# Patient Record
Sex: MALE | Race: WHITE | NOT HISPANIC OR LATINO | Employment: OTHER | URBAN - METROPOLITAN AREA
[De-identification: names, ages, dates, MRNs, and addresses within clinical notes are randomized per-mention and may not be internally consistent; named-entity substitution may affect disease eponyms.]

---

## 2017-03-07 ENCOUNTER — TELEPHONE (OUTPATIENT)
Dept: GASTROENTEROLOGY | Facility: CLINIC | Age: 62
End: 2017-03-07

## 2017-03-31 ENCOUNTER — TELEPHONE (OUTPATIENT)
Dept: GASTROENTEROLOGY | Facility: CLINIC | Age: 62
End: 2017-03-31

## 2017-03-31 NOTE — TELEPHONE ENCOUNTER
----- Message from Dominga Capone LPN sent at 3/7/2017  8:16 AM CST -----  Regarding: Schedule colon consult apt  Schedule colon consult apt

## 2018-02-14 ENCOUNTER — HOSPITAL ENCOUNTER (EMERGENCY)
Facility: HOSPITAL | Age: 63
Discharge: HOME | End: 2018-02-14
Attending: EMERGENCY MEDICINE
Payer: MEDICARE

## 2018-02-14 VITALS
SYSTOLIC BLOOD PRESSURE: 155 MMHG | RESPIRATION RATE: 27 BRPM | OXYGEN SATURATION: 99 % | HEIGHT: 67 IN | WEIGHT: 187 LBS | BODY MASS INDEX: 29.35 KG/M2 | DIASTOLIC BLOOD PRESSURE: 85 MMHG | TEMPERATURE: 98 F | HEART RATE: 92 BPM

## 2018-02-14 DIAGNOSIS — Z76.0 MEDICATION REFILL: ICD-10-CM

## 2018-02-14 DIAGNOSIS — I48.91 ATRIAL FIBRILLATION WITH RVR: Primary | ICD-10-CM

## 2018-02-14 DIAGNOSIS — R07.9 CHEST PAIN: ICD-10-CM

## 2018-02-14 DIAGNOSIS — I50.9 CHF (CONGESTIVE HEART FAILURE): ICD-10-CM

## 2018-02-14 LAB
ALBUMIN SERPL BCP-MCNC: 3.7 G/DL
ALP SERPL-CCNC: 121 U/L
ALT SERPL W/O P-5'-P-CCNC: 21 U/L
ANION GAP SERPL CALC-SCNC: 15 MMOL/L
AST SERPL-CCNC: 32 U/L
BASOPHILS # BLD AUTO: 0.04 K/UL
BASOPHILS NFR BLD: 0.6 %
BILIRUB SERPL-MCNC: 1.9 MG/DL
BNP SERPL-MCNC: 757 PG/ML
BUN SERPL-MCNC: 14 MG/DL
CALCIUM SERPL-MCNC: 10 MG/DL
CHLORIDE SERPL-SCNC: 103 MMOL/L
CO2 SERPL-SCNC: 21 MMOL/L
CREAT SERPL-MCNC: 1 MG/DL
DIASTOLIC DYSFUNCTION: YES
DIFFERENTIAL METHOD: ABNORMAL
EOSINOPHIL # BLD AUTO: 0.1 K/UL
EOSINOPHIL NFR BLD: 2.1 %
ERYTHROCYTE [DISTWIDTH] IN BLOOD BY AUTOMATED COUNT: 15.6 %
EST. GFR  (AFRICAN AMERICAN): >60 ML/MIN/1.73 M^2
EST. GFR  (NON AFRICAN AMERICAN): >60 ML/MIN/1.73 M^2
ESTIMATED PA SYSTOLIC PRESSURE: 62.09
GLUCOSE SERPL-MCNC: 158 MG/DL
HCT VFR BLD AUTO: 50.8 %
HGB BLD-MCNC: 16.6 G/DL
LYMPHOCYTES # BLD AUTO: 2.6 K/UL
LYMPHOCYTES NFR BLD: 39.3 %
MCH RBC QN AUTO: 31.4 PG
MCHC RBC AUTO-ENTMCNC: 32.7 G/DL
MCV RBC AUTO: 96 FL
MITRAL VALVE MOBILITY: NORMAL
MITRAL VALVE REGURGITATION: ABNORMAL
MONOCYTES # BLD AUTO: 0.6 K/UL
MONOCYTES NFR BLD: 9 %
NEUTROPHILS # BLD AUTO: 3.2 K/UL
NEUTROPHILS NFR BLD: 49 %
PLATELET # BLD AUTO: 153 K/UL
PMV BLD AUTO: 9.8 FL
POTASSIUM SERPL-SCNC: 3.7 MMOL/L
PROT SERPL-MCNC: 8.3 G/DL
RBC # BLD AUTO: 5.29 M/UL
RETIRED EF AND QEF - SEE NOTES: 20 (ref 55–65)
SODIUM SERPL-SCNC: 139 MMOL/L
TRICUSPID VALVE REGURGITATION: ABNORMAL
TROPONIN I SERPL DL<=0.01 NG/ML-MCNC: 0.02 NG/ML
WBC # BLD AUTO: 6.57 K/UL

## 2018-02-14 PROCEDURE — 99285 EMERGENCY DEPT VISIT HI MDM: CPT | Mod: ,,, | Performed by: INTERNAL MEDICINE

## 2018-02-14 PROCEDURE — 83880 ASSAY OF NATRIURETIC PEPTIDE: CPT

## 2018-02-14 PROCEDURE — 84484 ASSAY OF TROPONIN QUANT: CPT

## 2018-02-14 PROCEDURE — 93306 TTE W/DOPPLER COMPLETE: CPT

## 2018-02-14 PROCEDURE — 25000003 PHARM REV CODE 250: Performed by: EMERGENCY MEDICINE

## 2018-02-14 PROCEDURE — 96374 THER/PROPH/DIAG INJ IV PUSH: CPT

## 2018-02-14 PROCEDURE — 93010 ELECTROCARDIOGRAM REPORT: CPT | Mod: ,,, | Performed by: INTERNAL MEDICINE

## 2018-02-14 PROCEDURE — 99284 EMERGENCY DEPT VISIT MOD MDM: CPT | Mod: 25

## 2018-02-14 PROCEDURE — 93306 TTE W/DOPPLER COMPLETE: CPT | Mod: 26,,, | Performed by: INTERNAL MEDICINE

## 2018-02-14 PROCEDURE — 85025 COMPLETE CBC W/AUTO DIFF WBC: CPT

## 2018-02-14 PROCEDURE — 93005 ELECTROCARDIOGRAM TRACING: CPT

## 2018-02-14 PROCEDURE — 80053 COMPREHEN METABOLIC PANEL: CPT

## 2018-02-14 RX ORDER — FUROSEMIDE 40 MG/1
40 TABLET ORAL 2 TIMES DAILY
COMMUNITY
End: 2018-02-14

## 2018-02-14 RX ORDER — FUROSEMIDE 40 MG/1
40 TABLET ORAL 2 TIMES DAILY
Qty: 30 TABLET | Refills: 0 | Status: SHIPPED | OUTPATIENT
Start: 2018-02-14 | End: 2018-02-20 | Stop reason: SDUPTHER

## 2018-02-14 RX ORDER — CARVEDILOL 6.25 MG/1
6.25 TABLET ORAL 2 TIMES DAILY WITH MEALS
Qty: 60 TABLET | Refills: 0 | Status: SHIPPED | OUTPATIENT
Start: 2018-02-14 | End: 2018-02-20 | Stop reason: SDUPTHER

## 2018-02-14 RX ORDER — DIGOXIN 250 MCG
250 TABLET ORAL DAILY
Qty: 30 TABLET | Refills: 0 | Status: SHIPPED | OUTPATIENT
Start: 2018-02-14 | End: 2018-02-20 | Stop reason: SDUPTHER

## 2018-02-14 RX ORDER — LISINOPRIL 10 MG/1
10 TABLET ORAL DAILY
COMMUNITY
End: 2018-02-14

## 2018-02-14 RX ORDER — CARVEDILOL 6.25 MG/1
6.25 TABLET ORAL 2 TIMES DAILY WITH MEALS
COMMUNITY
End: 2018-02-14

## 2018-02-14 RX ORDER — PANTOPRAZOLE SODIUM 40 MG/1
40 TABLET, DELAYED RELEASE ORAL DAILY
Qty: 90 TABLET | Refills: 0 | Status: ON HOLD | OUTPATIENT
Start: 2018-02-14 | End: 2018-08-17 | Stop reason: SDUPTHER

## 2018-02-14 RX ORDER — DIGOXIN 250 MCG
250 TABLET ORAL DAILY
COMMUNITY
End: 2018-02-14

## 2018-02-14 RX ORDER — ASPIRIN 81 MG/1
81 TABLET ORAL DAILY
COMMUNITY
End: 2018-02-20 | Stop reason: SDUPTHER

## 2018-02-14 RX ORDER — DILTIAZEM HYDROCHLORIDE 5 MG/ML
20 INJECTION INTRAVENOUS
Status: COMPLETED | OUTPATIENT
Start: 2018-02-14 | End: 2018-02-14

## 2018-02-14 RX ORDER — LISINOPRIL 10 MG/1
10 TABLET ORAL DAILY
Qty: 30 TABLET | Refills: 0 | Status: SHIPPED | OUTPATIENT
Start: 2018-02-14 | End: 2018-02-20 | Stop reason: SDUPTHER

## 2018-02-14 RX ADMIN — DILTIAZEM HYDROCHLORIDE 20 MG: 5 INJECTION INTRAVENOUS at 06:02

## 2018-02-14 RX ADMIN — APIXABAN 5 MG: 2.5 TABLET, FILM COATED ORAL at 10:02

## 2018-02-14 NOTE — HPI
This is a 62 year old male pt with PMHx NICM EF 25%, Chronic AF not previously on a/c due to GID, HTN, alc abuse in past presented to ED with chief complaint of getting weak, sweating, palpitations. Pt said he was doing ok yesterday but after drinking some beer he felt more tired and started having more palpitations. He had been seen a few years ago by cardiology and had been doing well. He is compliant with meds but is going to run out soon and came to ED for evaluation. He was given IV cardizem as he was in AF RVR and felt fine after HR improved.     ECG - AF RVR, trop negative, . Upon my exam pt felt fine, HR and BP well controlled. Wants to follow up in clinic.

## 2018-02-14 NOTE — HOSPITAL COURSE
As above, pt was given Diltiazem x 1 and HR improved and pt felt well. Echo performed revealed EF 20-25% as previously known. Stable for DC from ED and will follow up in clinic.

## 2018-02-14 NOTE — ED NOTES
Report received from MADISON Swenson. Care of patient transferred at this time.    Pt AAOx3, resting in bed, side rails up x 2, call bell within reach. NAD at this time. Will continue to monitor.

## 2018-02-14 NOTE — ASSESSMENT & PLAN NOTE
Cont current meds  Discussed low salt diet and fluid restriction  Patient is euvolemic  Will f/u in clinic

## 2018-02-14 NOTE — ED PROVIDER NOTES
SCRIBE #1 NOTE: I, Sherman Webster, am scribing for, and in the presence of, cT Arreola Do, MD. I have scribed the entire note.      History      Chief Complaint   Patient presents with    Chest Pain       Review of patient's allergies indicates:  No Known Allergies     HPI   HPI    2/14/2018, 6:35 AM   History obtained from the patient      History of Present Illness: Ute Mcbride is a 62 y.o. male patient with Hx of AFIB and MI presents to the Emergency Department for CP which onset suddenly this AM. Symptoms are epeisodic and modeerate in severity. Sx are exacerbated by nothing and relieved by nohting. Associated sxs include SOB. Pt states his CP and SOB has currently resolved. No other sxs reported. Pt states he is compliant with his medications but was not able to take his medications this AM. Patient denies any fever, N/V/D, chills, abd pain, weakness/numbness, radiating pain, chest tightness, leg swelling, palpitations, HA, lightheadedness, dizziness, cough and all other sxs at this time. Pt reports MI approximately 6 years ago and denies f/u with cardiology. No further complaints or concerns at this time.     Arrival mode: Personal vehicle     PCP: Kenneth Redding MD       Past Medical History:  Past Medical History:   Diagnosis Date    Anticoagulant long-term use     but has been noncompliant    Arthritis     Atrial fibrillation, chronic     Cardiomyopathy, nonischemic 11/9/2014    CHF (congestive heart failure)     Gout     Hypertension        Past Surgical History:  Past Surgical History:   Procedure Laterality Date    ANKLE SURGERY      CATARACT EXTRACTION W/  INTRAOCULAR LENS IMPLANT Bilateral          Family History:  History reviewed. No pertinent family history.    Social History:  Social History     Social History Main Topics    Smoking status: Never Smoker    Smokeless tobacco: Never Used    Alcohol use Yes    Drug use: No    Sexual activity: Not on file       ROS   Review  of Systems   Constitutional: Negative for chills and fever.   HENT: Negative for congestion and sore throat.    Respiratory: Positive for shortness of breath. Negative for cough, chest tightness and wheezing.    Cardiovascular: Positive for chest pain. Negative for palpitations and leg swelling.   Gastrointestinal: Negative for abdominal pain, nausea and vomiting.   Genitourinary: Negative for difficulty urinating and dysuria.   Musculoskeletal: Negative for back pain and neck pain.   Skin: Negative for rash.   Neurological: Negative for dizziness, weakness, light-headedness, numbness and headaches.   Psychiatric/Behavioral: Negative for agitation and confusion.   All other systems reviewed and are negative.      Physical Exam      Initial Vitals [02/14/18 0619]   BP Pulse Resp Temp SpO2   (!) 170/106 (!) 128 19 97.5 °F (36.4 °C) 99 %      MAP       127.33          Physical Exam  Nursing Notes and Vital Signs Reviewed.  Constitutional: Patient is in no apparent distress. Well-developed and well-nourished.  Head: Atraumatic. Normocephalic.  Eyes: PERRL. EOM intact. Conjunctivae are not pale. No scleral icterus.  ENT: Mucous membranes are moist. Oropharynx is clear and symmetric.    Neck: Supple. Full ROM. No lymphadenopathy.  Cardiovascular: Irregularly irregular rate and rhythm. No murmurs, rubs, or gallops. Distal pulses are 2+ and symmetric.  Pulmonary/Chest: No respiratory distress. Clear to auscultation bilaterally. No wheezing or rales.  Abdominal: Soft and non-distended.  There is no tenderness.  No rebound, guarding, or rigidity. Good bowel sounds.  Musculoskeletal: Moves all extremities. No obvious deformities. No edema. No calf tenderness. Gouty arthritis noted to hands, lots of tophi noted. Radial pulses are equal and 2+ bilaterally. DP and PT pulses are equal and 2+ bilaterally.   Skin: Warm and dry.  Neurological:  Alert, awake, and appropriate.  Normal speech.  No acute focal neurological deficits are  "appreciated.  Psychiatric: Normal affect. Good eye contact. Appropriate in content.    ED Course    Critical Care  Date/Time: 2/14/2018 8:22 AM  Performed by: ALIX LAZO  Authorized by: ALIX LAZO   Direct patient critical care time: 15 minutes  Additional history critical care time: 10 minutes  Ordering / reviewing critical care time: 10 minutes  Documentation critical care time: 10 minutes  Consulting other physicians critical care time: 10 minutes  Total critical care time (exclusive of procedural time) : 55 minutes  Critical care time was exclusive of separately billable procedures and treating other patients and teaching time.  Critical care was necessary to treat or prevent imminent or life-threatening deterioration of the following conditions: cardiac failure (AFIB with RVR).  Critical care was time spent personally by me on the following activities: blood draw for specimens, development of treatment plan with patient or surrogate, discussions with consultants, interpretation of cardiac output measurements, evaluation of patient's response to treatment, examination of patient, obtaining history from patient or surrogate, ordering and performing treatments and interventions, ordering and review of radiographic studies, ordering and review of laboratory studies, pulse oximetry, re-evaluation of patient's condition and review of old charts.  Subsequent provider of critical care: I assumed direction of critical care for this patient from another provider of my specialty.        ED Vital Signs:  Vitals:    02/14/18 0619 02/14/18 0634 02/14/18 0644 02/14/18 0647   BP: (!) 170/106   (!) 140/81   Pulse: (!) 128 (!) 144 94 89   Resp: 19 (!) 29 (!) 23    Temp: 97.5 °F (36.4 °C)      TempSrc: Oral      SpO2: 99% 100% 100% 99%   Weight: 84.8 kg (187 lb)      Height: 5' 7" (1.702 m)       02/14/18 0730 02/14/18 0800 02/14/18 0900 02/14/18 1041   BP: 120/80 138/89 (!) 135/92    Pulse: 65 79 76 79   Resp: 19 " (!) 22 20    Temp:       TempSrc:       SpO2: 98% 99% 99%    Weight:       Height:        02/14/18 1132   BP: (!) 155/85   Pulse: 92   Resp: (!) 27   Temp: 98 °F (36.7 °C)   TempSrc: Oral   SpO2: 99%   Weight:    Height:        Abnormal Lab Results:  Labs Reviewed   CBC W/ AUTO DIFFERENTIAL - Abnormal; Notable for the following:        Result Value    MCH 31.4 (*)     RDW 15.6 (*)     All other components within normal limits   COMPREHENSIVE METABOLIC PANEL - Abnormal; Notable for the following:     CO2 21 (*)     Glucose 158 (*)     Total Bilirubin 1.9 (*)     All other components within normal limits   B-TYPE NATRIURETIC PEPTIDE - Abnormal; Notable for the following:      (*)     All other components within normal limits   TROPONIN I        All Lab Results:  Results for orders placed or performed during the hospital encounter of 02/14/18   CBC auto differential   Result Value Ref Range    WBC 6.57 3.90 - 12.70 K/uL    RBC 5.29 4.60 - 6.20 M/uL    Hemoglobin 16.6 14.0 - 18.0 g/dL    Hematocrit 50.8 40.0 - 54.0 %    MCV 96 82 - 98 fL    MCH 31.4 (H) 27.0 - 31.0 pg    MCHC 32.7 32.0 - 36.0 g/dL    RDW 15.6 (H) 11.5 - 14.5 %    Platelets 153 150 - 350 K/uL    MPV 9.8 9.2 - 12.9 fL    Gran # (ANC) 3.2 1.8 - 7.7 K/uL    Lymph # 2.6 1.0 - 4.8 K/uL    Mono # 0.6 0.3 - 1.0 K/uL    Eos # 0.1 0.0 - 0.5 K/uL    Baso # 0.04 0.00 - 0.20 K/uL    Gran% 49.0 38.0 - 73.0 %    Lymph% 39.3 18.0 - 48.0 %    Mono% 9.0 4.0 - 15.0 %    Eosinophil% 2.1 0.0 - 8.0 %    Basophil% 0.6 0.0 - 1.9 %    Differential Method Automated    Comprehensive metabolic panel   Result Value Ref Range    Sodium 139 136 - 145 mmol/L    Potassium 3.7 3.5 - 5.1 mmol/L    Chloride 103 95 - 110 mmol/L    CO2 21 (L) 23 - 29 mmol/L    Glucose 158 (H) 70 - 110 mg/dL    BUN, Bld 14 8 - 23 mg/dL    Creatinine 1.0 0.5 - 1.4 mg/dL    Calcium 10.0 8.7 - 10.5 mg/dL    Total Protein 8.3 6.0 - 8.4 g/dL    Albumin 3.7 3.5 - 5.2 g/dL    Total Bilirubin 1.9 (H) 0.1 -  1.0 mg/dL    Alkaline Phosphatase 121 55 - 135 U/L    AST 32 10 - 40 U/L    ALT 21 10 - 44 U/L    Anion Gap 15 8 - 16 mmol/L    eGFR if African American >60 >60 mL/min/1.73 m^2    eGFR if non African American >60 >60 mL/min/1.73 m^2   Troponin I #1   Result Value Ref Range    Troponin I 0.019 0.000 - 0.026 ng/mL   B-Type natriuretic peptide (BNP)   Result Value Ref Range     (H) 0 - 99 pg/mL   2D echo with color flow doppler   Result Value Ref Range    EF 20 (A) 55 - 65    Mitral Valve Regurgitation MILD     Diastolic Dysfunction Yes (A)     Est. PA Systolic Pressure 62.09 (A)     Mitral Valve Mobility NORMAL     Tricuspid Valve Regurgitation MODERATE (A)          Imaging Results:  Imaging Results          X-Ray Chest 1 View (Final result)  Result time 02/14/18 07:59:05    Final result by MARCELA Collazo Sr., MD (02/14/18 07:59:05)                 Impression:        1. The lungs are clear.  2. There is mild cardiomegaly..      Electronically signed by: MARCELA COLLAZO MD  Date:     02/14/18  Time:    07:59              Narrative:    One-view chest x-ray    Clinical History: Chest pain, unspecified    Finding: Comparison was made to prior examination performed on 11/8/2014. There is mild cardiomegaly. The lungs are clear. There is no pneumothorax.  The costophrenic angles are sharp.                               The EKG was ordered, reviewed, and independently interpreted by the ED provider.  Interpretation time: 0629  Rate: 138 BPM  Rhythm: AFIB with RVR  Interpretation: R BBB. No STEMI.         The Emergency Provider reviewed the vital signs and test results, which are outlined above.    ED Discussion     7:55 AM: Re-evaluated pt. Pt is resting comfortably and is in no acute distress.  Pt's heart rate is now 82.  D/w pt all pertinent results. D/w pt any concerns expressed at this time. Answered all questions. Pt expresses understanding at this time.    8:22 AM: Re-evaluated pt. Pt is resting comfortably and is  in no acute distress.  D/w pt all pertinent results. D/w pt any concerns expressed at this time. Answered all questions. Pt expresses understanding at this time.    9:31 AM: After reviewing previous charts pt was suppose to be put back on coumadin after dental work but he never followed up with cardiology. Pt last saw Dr. Cash January 2015. Pt has chronic systolic heart failure with an EF of 25%. Pt has a severely depressed left ventricular systolic function and right ventricular enlargement with moderately depressed systolic function.    10:18 AM: Dr. Aguirre discussed the pt's case with Dr. Foreman (Cardiology) who recommends putting pt on eliquis and he will come see pt in the ED.    10:21 AM: Reassessed pt at this time.  Pt stable. Dr. Foreman ordered echo and will also f/u pt in clinic.  Refilled all pt meds today.  Pt states his condition has improved at this time. Pt is laying comfortably in ED bed and in NAD. Pt is awake, alert, and oriented. Discussed with pt all pertinent ED information and results. Discussed pt dx and plan of tx. Gave pt all f/u and return to the ED instructions. All questions and concerns were addressed at this time. Pt expresses understanding of information and instructions, and is comfortable with plan to discharge. Pt is stable for discharge.    I have discussed with patient and/or family/caretaker chest pain precautions, specifically to return for worsening chest pain, shortness of breath, fever, or any concern.  I have low suspicion for cardiopulmonary, vascular, infectious, respiratory, or other emergent medical condition based on my evaluation in the ED.    ED Medication(s):  Medications   diltiaZEM injection 20 mg (20 mg Intravenous Given 2/14/18 0639)   apixaban tablet 5 mg (5 mg Oral Given 2/14/18 1034)       Discharge Medication List as of 2/14/2018 11:37 AM      START taking these medications    Details   apixaban 5 mg Tab Take 1 tablet (5 mg total) by mouth 2 (two) times daily.,  Starting Wed 2/14/2018, Print             Follow-up Information     Chano Foreman Md, MD In 2 days.    Specialties:  Cardiology, Internal Medicine  Contact information:  [address] [id number] [phone]                     Medical Decision Making    Medical Decision Making:   Clinical Tests:   Lab Tests: Ordered and Reviewed  Radiological Study: Reviewed and Ordered  Medical Tests: Ordered and Reviewed           Scribe Attestation:   Scribe #1: I performed the above scribed service and the documentation accurately describes the services I performed. I attest to the accuracy of the note.    Attending:   Physician Attestation Statement for Scribe #1: I, Tc Arreola Do, MD, personally performed the services described in this documentation, as scribed by Sherman Webster, in my presence, and it is both accurate and complete.          Clinical Impression       ICD-10-CM ICD-9-CM   1. Atrial fibrillation with RVR I48.91 427.31   2. Chest pain R07.9 786.50   3. CHF (congestive heart failure) I50.9 428.0   4. Medication refill Z76.0 V68.1       Disposition:   Disposition: Discharged  Condition: Stable         Tc Arreola Do, MD  02/14/18 1311

## 2018-02-14 NOTE — CONSULTS
[facility]  Cardiology  Consult Note    Patient Name: Ute Mcbride  MRN: [id number]  Admission Date: 2/14/2018  Hospital Length of Stay: 0 days  Code Status: Prior   Attending Provider: No att. providers found   Consulting Provider: Ernestine Gutierrez Md, MD  Primary Care Physician: Kenneth Redding MD  Principal Problem:<principal problem not specified>    Patient information was obtained from patient and ER records.     Inpatient consult to Cardiology  Consult performed by: ERNESTINE GUTIERREZ  Consult ordered by: ALIX LAZO  Reason for consult: Afib, CHF        Subjective:     Chief Complaint:  SOB/CP/palpitations     HPI:   This is a 62 year old male pt with PMHx NICM EF 25%, Chronic AF not previously on a/c due to GID, HTN, alc abuse in past presented to ED with chief complaint of getting weak, sweating, palpitations. Pt said he was doing ok yesterday but after drinking some beer he felt more tired and started having more palpitations. He had been seen a few years ago by cardiology and had been doing well. He is compliant with meds but is going to run out soon and came to ED for evaluation. He was given IV cardizem as he was in AF RVR and felt fine after HR improved.     ECG - AF RVR, trop negative, . Upon my exam pt felt fine, HR and BP well controlled. Wants to follow up in clinic.         Past Medical History:   Diagnosis Date    Anticoagulant long-term use     but has been noncompliant    Arthritis     Atrial fibrillation, chronic     Cardiomyopathy, nonischemic 11/9/2014    CHF (congestive heart failure)     Gout     Hypertension        Past Surgical History:   Procedure Laterality Date    ANKLE SURGERY      CATARACT EXTRACTION W/  INTRAOCULAR LENS IMPLANT Bilateral        Review of patient's allergies indicates:  No Known Allergies    No current facility-administered medications on file prior to encounter.      Current Outpatient Prescriptions on File Prior to Encounter    Medication Sig    [DISCONTINUED] carvedilol (COREG) 6.25 MG tablet Take 12.5 mg by mouth 2 (two) times daily.    [DISCONTINUED] furosemide (LASIX) 40 MG tablet Take 1 tablet (40 mg total) by mouth once daily.    [DISCONTINUED] lisinopril (PRINIVIL,ZESTRIL) 2.5 MG tablet Take 1 tablet (2.5 mg total) by mouth once daily.    [DISCONTINUED] pantoprazole (PROTONIX) 40 MG tablet TAKE 1 TABLET BY MOUTH ONCE DAILY    [DISCONTINUED] warfarin (COUMADIN) 5 MG tablet TAKE ONE TABLET BY MOUTH EVERY DAY     Family History     None        Social History Main Topics    Smoking status: Never Smoker    Smokeless tobacco: Never Used    Alcohol use Yes    Drug use: No    Sexual activity: Not on file     Review of Systems   Constitution: Positive for weakness.   HENT: Negative.    Eyes: Negative.    Cardiovascular: Positive for palpitations.   Respiratory: Positive for shortness of breath.    Endocrine: Negative.    Skin: Negative.    Musculoskeletal: Negative.    Gastrointestinal: Negative.    Genitourinary: Negative.    Psychiatric/Behavioral: Negative.      Objective:     Vital Signs (Most Recent):  Temp: 98 °F (36.7 °C) (02/14/18 1132)  Pulse: 92 (02/14/18 1132)  Resp: (!) 27 (02/14/18 1132)  BP: (!) 155/85 (02/14/18 1132)  SpO2: 99 % (02/14/18 1132) Vital Signs (24h Range):  Temp:  [97.5 °F (36.4 °C)-98 °F (36.7 °C)] 98 °F (36.7 °C)  Pulse:  [] 92  Resp:  [19-29] 27  SpO2:  [98 %-100 %] 99 %  BP: (120-170)/() 155/85     Weight: 84.8 kg (187 lb)  Body mass index is 29.29 kg/m².    SpO2: 99 %  O2 Device (Oxygen Therapy): room air    No intake or output data in the 24 hours ending 02/14/18 1324    Lines/Drains/Airways          No matching active lines, drains, or airways          Physical Exam   Constitutional: He is oriented to person, place, and time. He appears well-developed and well-nourished. No distress.   HENT:   Head: Normocephalic and atraumatic.   Nose: Nose normal.   Mouth/Throat: Oropharynx is  clear and moist.   Eyes: Conjunctivae and EOM are normal. No scleral icterus.   Neck: Normal range of motion. Neck supple. No JVD present. No thyromegaly present.   Cardiovascular: S1 normal and S2 normal.  An irregularly irregular rhythm present. Exam reveals no gallop, no S3, no S4 and no friction rub.    No murmur heard.  Pulmonary/Chest: Effort normal and breath sounds normal. No stridor. No respiratory distress. He has no wheezes. He has no rales. He exhibits no tenderness.   Abdominal: Soft. Bowel sounds are normal. He exhibits no distension and no mass. There is no tenderness. There is no rebound.   Genitourinary:   Genitourinary Comments: Deferred   Musculoskeletal: Normal range of motion. He exhibits no edema, tenderness or deformity.   Lymphadenopathy:     He has no cervical adenopathy.   Neurological: He is alert and oriented to person, place, and time. He exhibits normal muscle tone. Coordination normal.   Skin: Skin is warm and dry. No rash noted. He is not diaphoretic. No erythema. No pallor.   Psychiatric: He has a normal mood and affect. His behavior is normal. Judgment and thought content normal.   Nursing note and vitals reviewed.      Significant Labs:   All pertinent lab results from the last 24 hours have been reviewed. and   Recent Lab Results       02/14/18  1120 02/14/18  0638      Albumin  3.7     Alkaline Phosphatase  121     ALT  21     Anion Gap  15     AST  32     Baso #  0.04     Basophil%  0.6     Total Bilirubin  1.9  Comment:  For infants and newborns, interpretation of results should be based  on gestational age, weight and in agreement with clinical  observations.  Premature Infant recommended reference ranges:  Up to 24 hours.............<8.0 mg/dL  Up to 48 hours............<12.0 mg/dL  3-5 days..................<15.0 mg/dL  6-29 days.................<15.0 mg/dL  (H)     BNP  757  Comment:  Values of less than 100 pg/ml are consistent with non-CHF populations.(H)     BUN, Bld  14      Calcium  10.0     Chloride  103     CO2  21(L)     Creatinine  1.0     Diastolic Dysfunction Yes(A)      Differential Method  Automated     EF 20(A)      eGFR if   >60     eGFR if non   >60  Comment:  Calculation used to obtain the estimated glomerular filtration  rate (eGFR) is the CKD-EPI equation.        Eos #  0.1     Eosinophil%  2.1     Glucose  158(H)     Gran # (ANC)  3.2     Gran%  49.0     Hematocrit  50.8     Hemoglobin  16.6     Lymph #  2.6     Lymph%  39.3     MCH  31.4(H)     MCHC  32.7     MCV  96     Mono #  0.6     Mono%  9.0     MPV  9.8     Mitral Valve Mobility NORMAL      Mitral Valve Regurgitation MILD      Est. PA Systolic Pressure 62.09(A)      Platelets  153     Potassium  3.7     Total Protein  8.3     RBC  5.29     RDW  15.6(H)     Sodium  139     Troponin I  0.019  Comment:  The reference interval for Troponin I represents the 99th percentile   cutoff   for our facility and is consistent with 3rd generation assay   performance.       Tricuspid Valve Regurgitation MODERATE(A)      WBC  6.57           Significant Imaging: Echocardiogram:   2D echo with color flow doppler:   Results for orders placed or performed during the hospital encounter of 02/14/18   2D echo with color flow doppler   Result Value Ref Range    EF 20 (A) 55 - 65    Mitral Valve Regurgitation MILD     Diastolic Dysfunction Yes (A)     Est. PA Systolic Pressure 62.09 (A)     Mitral Valve Mobility NORMAL     Tricuspid Valve Regurgitation MODERATE (A)     and X-Ray: CXR: X-Ray Chest 1 View (CXR):   Results for orders placed or performed during the hospital encounter of 02/14/18   X-Ray Chest 1 View    Narrative    One-view chest x-ray    Clinical History: Chest pain, unspecified    Finding: Comparison was made to prior examination performed on 11/8/2014. There is mild cardiomegaly. The lungs are clear. There is no pneumothorax.  The costophrenic angles are sharp.    Impression        1.  The lungs are clear.  2. There is mild cardiomegaly..      Electronically signed by: MARCELA FRITZ MD  Date:     02/14/18  Time:    07:59      Assessment and Plan:     Acute on chronic systolic HF (heart failure)    Cont current meds  Discussed low salt diet and fluid restriction  Patient is euvolemic  Will f/u in clinic        Atrial fibrillation with RVR    Rate controlled  Cont home meds  Start Eliquis for A/C  Follow up in clinic        Hypertensive CHF (congestive heart failure)    Continue home meds  Will titrate in clinic as needed            VTE Risk Mitigation     None          Thank you for your consult. I will follow-up with patient. Please contact us if you have any additional questions.    Chano Foreman Md, MD  Cardiology   [facility]

## 2018-02-14 NOTE — SUBJECTIVE & OBJECTIVE
Past Medical History:   Diagnosis Date    Anticoagulant long-term use     but has been noncompliant    Arthritis     Atrial fibrillation, chronic     Cardiomyopathy, nonischemic 11/9/2014    CHF (congestive heart failure)     Gout     Hypertension        Past Surgical History:   Procedure Laterality Date    ANKLE SURGERY      CATARACT EXTRACTION W/  INTRAOCULAR LENS IMPLANT Bilateral        Review of patient's allergies indicates:  No Known Allergies    No current facility-administered medications on file prior to encounter.      Current Outpatient Prescriptions on File Prior to Encounter   Medication Sig    [DISCONTINUED] carvedilol (COREG) 6.25 MG tablet Take 12.5 mg by mouth 2 (two) times daily.    [DISCONTINUED] furosemide (LASIX) 40 MG tablet Take 1 tablet (40 mg total) by mouth once daily.    [DISCONTINUED] lisinopril (PRINIVIL,ZESTRIL) 2.5 MG tablet Take 1 tablet (2.5 mg total) by mouth once daily.    [DISCONTINUED] pantoprazole (PROTONIX) 40 MG tablet TAKE 1 TABLET BY MOUTH ONCE DAILY    [DISCONTINUED] warfarin (COUMADIN) 5 MG tablet TAKE ONE TABLET BY MOUTH EVERY DAY     Family History     None        Social History Main Topics    Smoking status: Never Smoker    Smokeless tobacco: Never Used    Alcohol use Yes    Drug use: No    Sexual activity: Not on file     Review of Systems   Constitution: Positive for weakness.   HENT: Negative.    Eyes: Negative.    Cardiovascular: Positive for palpitations.   Respiratory: Positive for shortness of breath.    Endocrine: Negative.    Skin: Negative.    Musculoskeletal: Negative.    Gastrointestinal: Negative.    Genitourinary: Negative.    Psychiatric/Behavioral: Negative.      Objective:     Vital Signs (Most Recent):  Temp: 98 °F (36.7 °C) (02/14/18 1132)  Pulse: 92 (02/14/18 1132)  Resp: (!) 27 (02/14/18 1132)  BP: (!) 155/85 (02/14/18 1132)  SpO2: 99 % (02/14/18 1132) Vital Signs (24h Range):  Temp:  [97.5 °F (36.4 °C)-98 °F (36.7 °C)] 98 °F  (36.7 °C)  Pulse:  [] 92  Resp:  [19-29] 27  SpO2:  [98 %-100 %] 99 %  BP: (120-170)/() 155/85     Weight: 84.8 kg (187 lb)  Body mass index is 29.29 kg/m².    SpO2: 99 %  O2 Device (Oxygen Therapy): room air    No intake or output data in the 24 hours ending 02/14/18 1324    Lines/Drains/Airways          No matching active lines, drains, or airways          Physical Exam   Constitutional: He is oriented to person, place, and time. He appears well-developed and well-nourished. No distress.   HENT:   Head: Normocephalic and atraumatic.   Nose: Nose normal.   Mouth/Throat: Oropharynx is clear and moist.   Eyes: Conjunctivae and EOM are normal. No scleral icterus.   Neck: Normal range of motion. Neck supple. No JVD present. No thyromegaly present.   Cardiovascular: S1 normal and S2 normal.  An irregularly irregular rhythm present. Exam reveals no gallop, no S3, no S4 and no friction rub.    No murmur heard.  Pulmonary/Chest: Effort normal and breath sounds normal. No stridor. No respiratory distress. He has no wheezes. He has no rales. He exhibits no tenderness.   Abdominal: Soft. Bowel sounds are normal. He exhibits no distension and no mass. There is no tenderness. There is no rebound.   Genitourinary:   Genitourinary Comments: Deferred   Musculoskeletal: Normal range of motion. He exhibits no edema, tenderness or deformity.   Lymphadenopathy:     He has no cervical adenopathy.   Neurological: He is alert and oriented to person, place, and time. He exhibits normal muscle tone. Coordination normal.   Skin: Skin is warm and dry. No rash noted. He is not diaphoretic. No erythema. No pallor.   Psychiatric: He has a normal mood and affect. His behavior is normal. Judgment and thought content normal.   Nursing note and vitals reviewed.      Significant Labs:   All pertinent lab results from the last 24 hours have been reviewed. and   Recent Lab Results       02/14/18  1120 02/14/18  0638      Albumin  3.7      Alkaline Phosphatase  121     ALT  21     Anion Gap  15     AST  32     Baso #  0.04     Basophil%  0.6     Total Bilirubin  1.9  Comment:  For infants and newborns, interpretation of results should be based  on gestational age, weight and in agreement with clinical  observations.  Premature Infant recommended reference ranges:  Up to 24 hours.............<8.0 mg/dL  Up to 48 hours............<12.0 mg/dL  3-5 days..................<15.0 mg/dL  6-29 days.................<15.0 mg/dL  (H)     BNP  757  Comment:  Values of less than 100 pg/ml are consistent with non-CHF populations.(H)     BUN, Bld  14     Calcium  10.0     Chloride  103     CO2  21(L)     Creatinine  1.0     Diastolic Dysfunction Yes(A)      Differential Method  Automated     EF 20(A)      eGFR if   >60     eGFR if non   >60  Comment:  Calculation used to obtain the estimated glomerular filtration  rate (eGFR) is the CKD-EPI equation.        Eos #  0.1     Eosinophil%  2.1     Glucose  158(H)     Gran # (ANC)  3.2     Gran%  49.0     Hematocrit  50.8     Hemoglobin  16.6     Lymph #  2.6     Lymph%  39.3     MCH  31.4(H)     MCHC  32.7     MCV  96     Mono #  0.6     Mono%  9.0     MPV  9.8     Mitral Valve Mobility NORMAL      Mitral Valve Regurgitation MILD      Est. PA Systolic Pressure 62.09(A)      Platelets  153     Potassium  3.7     Total Protein  8.3     RBC  5.29     RDW  15.6(H)     Sodium  139     Troponin I  0.019  Comment:  The reference interval for Troponin I represents the 99th percentile   cutoff   for our facility and is consistent with 3rd generation assay   performance.       Tricuspid Valve Regurgitation MODERATE(A)      WBC  6.57           Significant Imaging: Echocardiogram:   2D echo with color flow doppler:   Results for orders placed or performed during the hospital encounter of 02/14/18   2D echo with color flow doppler   Result Value Ref Range    EF 20 (A) 55 - 65    Mitral Valve  Regurgitation MILD     Diastolic Dysfunction Yes (A)     Est. PA Systolic Pressure 62.09 (A)     Mitral Valve Mobility NORMAL     Tricuspid Valve Regurgitation MODERATE (A)     and X-Ray: CXR: X-Ray Chest 1 View (CXR):   Results for orders placed or performed during the hospital encounter of 02/14/18   X-Ray Chest 1 View    Narrative    One-view chest x-ray    Clinical History: Chest pain, unspecified    Finding: Comparison was made to prior examination performed on 11/8/2014. There is mild cardiomegaly. The lungs are clear. There is no pneumothorax.  The costophrenic angles are sharp.    Impression        1. The lungs are clear.  2. There is mild cardiomegaly..      Electronically signed by: MARCELA FRITZ MD  Date:     02/14/18  Time:    07:59

## 2018-02-15 ENCOUNTER — TELEPHONE (OUTPATIENT)
Dept: CARDIOLOGY | Facility: CLINIC | Age: 63
End: 2018-02-15

## 2018-02-15 NOTE — TELEPHONE ENCOUNTER
----- Message from Asuncion Way sent at 2/15/2018  4:36 PM CST -----  Contact: self   Patient would like to consult with nurse regarding a er f/u appointment . Please call back at  [phone].    Thanks,  Asuncion Way

## 2018-02-15 NOTE — TELEPHONE ENCOUNTER
Called back to patient--states needs a follow up appointment with Dr. Foreman next week--appointment has been scheduled--patient has been made aware of appointment date, location, and time---

## 2018-02-20 ENCOUNTER — OFFICE VISIT (OUTPATIENT)
Dept: CARDIOLOGY | Facility: CLINIC | Age: 63
End: 2018-02-20
Payer: MEDICARE

## 2018-02-20 ENCOUNTER — HOSPITAL ENCOUNTER (EMERGENCY)
Facility: HOSPITAL | Age: 63
Discharge: HOME | End: 2018-02-21
Attending: EMERGENCY MEDICINE
Payer: MEDICARE

## 2018-02-20 VITALS
HEIGHT: 67 IN | BODY MASS INDEX: 28.55 KG/M2 | DIASTOLIC BLOOD PRESSURE: 82 MMHG | WEIGHT: 181.88 LBS | HEART RATE: 60 BPM | SYSTOLIC BLOOD PRESSURE: 118 MMHG

## 2018-02-20 DIAGNOSIS — T45.515A WARFARIN-INDUCED COAGULOPATHY: Primary | ICD-10-CM

## 2018-02-20 DIAGNOSIS — I11.0 HYPERTENSIVE CHF (CONGESTIVE HEART FAILURE): Chronic | ICD-10-CM

## 2018-02-20 DIAGNOSIS — I50.23 ACUTE ON CHRONIC SYSTOLIC HF (HEART FAILURE): ICD-10-CM

## 2018-02-20 DIAGNOSIS — D68.32 WARFARIN-INDUCED COAGULOPATHY: Primary | ICD-10-CM

## 2018-02-20 DIAGNOSIS — I48.21 ATRIAL FIBRILLATION, PERMANENT: Primary | ICD-10-CM

## 2018-02-20 DIAGNOSIS — I42.8 CARDIOMYOPATHY, NONISCHEMIC: ICD-10-CM

## 2018-02-20 LAB
ALBUMIN SERPL BCP-MCNC: 3.5 G/DL
ALP SERPL-CCNC: 107 U/L
ALT SERPL W/O P-5'-P-CCNC: 19 U/L
ANION GAP SERPL CALC-SCNC: 14 MMOL/L
APTT BLDCRRT: 41.1 SEC
AST SERPL-CCNC: 23 U/L
BASOPHILS # BLD AUTO: 0.03 K/UL
BASOPHILS NFR BLD: 0.5 %
BILIRUB SERPL-MCNC: 1.6 MG/DL
BUN SERPL-MCNC: 10 MG/DL
CALCIUM SERPL-MCNC: 9.3 MG/DL
CHLORIDE SERPL-SCNC: 101 MMOL/L
CO2 SERPL-SCNC: 24 MMOL/L
CREAT SERPL-MCNC: 0.8 MG/DL
DIFFERENTIAL METHOD: ABNORMAL
EOSINOPHIL # BLD AUTO: 0.1 K/UL
EOSINOPHIL NFR BLD: 1.6 %
ERYTHROCYTE [DISTWIDTH] IN BLOOD BY AUTOMATED COUNT: 15 %
EST. GFR  (AFRICAN AMERICAN): >60 ML/MIN/1.73 M^2
EST. GFR  (NON AFRICAN AMERICAN): >60 ML/MIN/1.73 M^2
GLUCOSE SERPL-MCNC: 139 MG/DL
HCT VFR BLD AUTO: 48.3 %
HGB BLD-MCNC: 16.1 G/DL
INR PPP: >10
LYMPHOCYTES # BLD AUTO: 1.6 K/UL
LYMPHOCYTES NFR BLD: 26.8 %
MCH RBC QN AUTO: 31.1 PG
MCHC RBC AUTO-ENTMCNC: 33.3 G/DL
MCV RBC AUTO: 93 FL
MONOCYTES # BLD AUTO: 0.5 K/UL
MONOCYTES NFR BLD: 9.2 %
NEUTROPHILS # BLD AUTO: 3.6 K/UL
NEUTROPHILS NFR BLD: 61.9 %
PLATELET # BLD AUTO: 130 K/UL
PMV BLD AUTO: 9.6 FL
POTASSIUM SERPL-SCNC: 3.7 MMOL/L
PROT SERPL-MCNC: 7.6 G/DL
PROTHROMBIN TIME: >100 SEC
RBC # BLD AUTO: 5.18 M/UL
SODIUM SERPL-SCNC: 139 MMOL/L
WBC # BLD AUTO: 5.78 K/UL

## 2018-02-20 PROCEDURE — 85610 PROTHROMBIN TIME: CPT

## 2018-02-20 PROCEDURE — 80053 COMPREHEN METABOLIC PANEL: CPT

## 2018-02-20 PROCEDURE — 99214 OFFICE O/P EST MOD 30 MIN: CPT | Mod: S$PBB,,, | Performed by: INTERNAL MEDICINE

## 2018-02-20 PROCEDURE — 85025 COMPLETE CBC W/AUTO DIFF WBC: CPT

## 2018-02-20 PROCEDURE — 99214 OFFICE O/P EST MOD 30 MIN: CPT | Mod: PBBFAC,PO | Performed by: INTERNAL MEDICINE

## 2018-02-20 PROCEDURE — 99283 EMERGENCY DEPT VISIT LOW MDM: CPT | Mod: 25,27

## 2018-02-20 PROCEDURE — 96365 THER/PROPH/DIAG IV INF INIT: CPT

## 2018-02-20 PROCEDURE — 85730 THROMBOPLASTIN TIME PARTIAL: CPT

## 2018-02-20 PROCEDURE — 99999 PR PBB SHADOW E&M-EST. PATIENT-LVL IV: CPT | Mod: PBBFAC,,, | Performed by: INTERNAL MEDICINE

## 2018-02-20 RX ORDER — ASPIRIN 81 MG/1
81 TABLET ORAL DAILY
Qty: 90 TABLET | Refills: 3 | Status: SHIPPED | OUTPATIENT
Start: 2018-02-20 | End: 2018-03-08

## 2018-02-20 RX ORDER — DIGOXIN 250 MCG
250 TABLET ORAL DAILY
Qty: 90 TABLET | Refills: 3 | Status: SHIPPED | OUTPATIENT
Start: 2018-02-20 | End: 2019-01-08 | Stop reason: SDUPTHER

## 2018-02-20 RX ORDER — DIGOXIN 250 MCG
250 TABLET ORAL DAILY
Qty: 90 TABLET | Refills: 3 | Status: SHIPPED | OUTPATIENT
Start: 2018-02-20 | End: 2018-02-20 | Stop reason: SDUPTHER

## 2018-02-20 RX ORDER — WARFARIN SODIUM 5 MG/1
5 TABLET ORAL DAILY
Qty: 30 TABLET | Refills: 3 | Status: SHIPPED | OUTPATIENT
Start: 2018-02-20 | End: 2018-03-02 | Stop reason: DRUGHIGH

## 2018-02-20 RX ORDER — LISINOPRIL 10 MG/1
10 TABLET ORAL DAILY
Qty: 90 TABLET | Refills: 3 | Status: SHIPPED | OUTPATIENT
Start: 2018-02-20 | End: 2019-01-08 | Stop reason: SDUPTHER

## 2018-02-20 RX ORDER — FUROSEMIDE 40 MG/1
40 TABLET ORAL 2 TIMES DAILY
Qty: 30 TABLET | Refills: 0 | Status: ON HOLD | OUTPATIENT
Start: 2018-02-20 | End: 2018-08-17 | Stop reason: HOSPADM

## 2018-02-20 RX ORDER — WARFARIN SODIUM 5 MG/1
5 TABLET ORAL DAILY
Qty: 30 TABLET | Refills: 3 | Status: SHIPPED | OUTPATIENT
Start: 2018-02-20 | End: 2018-02-20 | Stop reason: SDUPTHER

## 2018-02-20 RX ORDER — POTASSIUM CHLORIDE 750 MG/1
20 TABLET, EXTENDED RELEASE ORAL DAILY
Qty: 90 TABLET | Refills: 3 | Status: SHIPPED | OUTPATIENT
Start: 2018-02-20 | End: 2018-04-09

## 2018-02-20 RX ORDER — WARFARIN SODIUM 5 MG/1
5 TABLET ORAL DAILY
COMMUNITY
End: 2018-02-20 | Stop reason: SDUPTHER

## 2018-02-20 RX ORDER — LISINOPRIL 10 MG/1
10 TABLET ORAL DAILY
Qty: 90 TABLET | Refills: 3 | Status: SHIPPED | OUTPATIENT
Start: 2018-02-20 | End: 2018-02-20 | Stop reason: SDUPTHER

## 2018-02-20 RX ORDER — ASPIRIN 81 MG/1
81 TABLET ORAL DAILY
COMMUNITY
Start: 2018-02-20 | End: 2018-02-20 | Stop reason: SDUPTHER

## 2018-02-20 RX ORDER — CARVEDILOL 6.25 MG/1
6.25 TABLET ORAL 2 TIMES DAILY WITH MEALS
Qty: 60 TABLET | Refills: 3 | Status: SHIPPED | OUTPATIENT
Start: 2018-02-20 | End: 2019-01-08 | Stop reason: SDUPTHER

## 2018-02-20 RX ORDER — CARVEDILOL 6.25 MG/1
6.25 TABLET ORAL 2 TIMES DAILY WITH MEALS
Qty: 60 TABLET | Refills: 3 | Status: SHIPPED | OUTPATIENT
Start: 2018-02-20 | End: 2018-02-20 | Stop reason: SDUPTHER

## 2018-02-20 RX ORDER — FUROSEMIDE 40 MG/1
40 TABLET ORAL 2 TIMES DAILY
Qty: 30 TABLET | Refills: 0 | Status: SHIPPED | OUTPATIENT
Start: 2018-02-20 | End: 2018-02-20 | Stop reason: SDUPTHER

## 2018-02-20 NOTE — PROGRESS NOTES
Subjective:   Patient ID:  Ute Mcbride is a 62 y.o. male who presents for cardiac consult of Congestive Heart Failure and Hospital Follow Up      HPI  The patient came in today for cardiac consult of Congestive Heart Failure and Hospital Follow Up    This is a 62 year old male pt with PMHx NICM EF 25%, Chronic AF not previously on a/c due to GID, HTN, alc abuse in past presented to ED 2/14/18 with chief complaint of getting weak, sweating, palpitations.    Pt said he was doing ok day prior to ED visit but after drinking some beer he felt more tired and started having more palpitations. He had been seen a few years ago by cardiology and had been doing well. He is compliant with meds but is going to run out soon and came to ED for evaluation. He was given IV cardizem as he was in AF RVR and felt fine after HR improved.   ECG - AF RVR, trop negative, . Upon my exam in ED pt felt fine, HR and BP well controlled.  He had 2D ECHO which revealed LVEF 20% as in past with PH with PASP 62 mmHg along with moderate/severe RV dysfunction.     Was discharged from ED to follow up in clinic.     Patient feels well, no specific complaints, no chest pain, no sob, no leg swelling, no PND, no palpitation, no dizziness, no syncope, no CNS symptoms.  Patient is compliant with medications.    2D ECHO 2/14/18  CONCLUSIONS     1 - Severely depressed left ventricular systolic function (EF 20-25%).     2 - Impaired LV relaxation, increased LVEDP.     3 - Severe left atrial enlargement.     4 - Eccentric hypertrophy.     5 - Right ventricular enlargement with moderately to severely depressed systolic function.     6 - Pulmonary hypertension. The estimated PA systolic pressure is greater than 62 mmHg.     7 - Mild mitral regurgitation.     8 - Moderate tricuspid regurgitation.     9 - Mild pulmonic regurgitation.       Past Medical History:   Diagnosis Date    Anticoagulant long-term use     but has been noncompliant    Arthritis      Atrial fibrillation, chronic     Cardiomyopathy, nonischemic 11/9/2014    CHF (congestive heart failure)     Gout     Hypertension        Past Surgical History:   Procedure Laterality Date    ANKLE SURGERY      CATARACT EXTRACTION W/  INTRAOCULAR LENS IMPLANT Bilateral        Social History   Substance Use Topics    Smoking status: Never Smoker    Smokeless tobacco: Never Used    Alcohol use Yes       No family history on file.    Patient's Medications   New Prescriptions    No medications on file   Previous Medications    APIXABAN 5 MG TAB    Take 1 tablet (5 mg total) by mouth 2 (two) times daily.    ASPIRIN (ECOTRIN) 81 MG EC TABLET    Take 81 mg by mouth once daily.    CARVEDILOL (COREG) 6.25 MG TABLET    Take 1 tablet (6.25 mg total) by mouth 2 (two) times daily with meals.    DIGOXIN (LANOXIN) 250 MCG TABLET    Take 1 tablet (250 mcg total) by mouth once daily.    FUROSEMIDE (LASIX) 40 MG TABLET    Take 1 tablet (40 mg total) by mouth 2 (two) times daily.    LISINOPRIL 10 MG TABLET    Take 1 tablet (10 mg total) by mouth once daily.    PANTOPRAZOLE (PROTONIX) 40 MG TABLET    Take 1 tablet (40 mg total) by mouth once daily.    WARFARIN (COUMADIN) 5 MG TABLET    Take 5 mg by mouth Daily.   Modified Medications    No medications on file   Discontinued Medications    No medications on file       Review of Systems   Constitutional: Negative.    HENT: Negative.    Eyes: Negative.    Respiratory: Negative.    Cardiovascular: Negative.    Gastrointestinal: Negative.    Genitourinary: Negative.    Musculoskeletal: Negative.    Skin: Negative.    Neurological: Negative.    Endo/Heme/Allergies: Negative.    Psychiatric/Behavioral: Negative.    All 12 systems otherwise negative.      Wt Readings from Last 3 Encounters:   02/20/18 82.5 kg (181 lb 14.1 oz)   02/14/18 84.8 kg (187 lb)   01/06/15 80.6 kg (177 lb 9.6 oz)     Temp Readings from Last 3 Encounters:   02/14/18 98 °F (36.7 °C) (Oral)   11/10/14 97.7  "°F (36.5 °C) (Oral)   06/14/14 98.2 °F (36.8 °C) (Oral)     BP Readings from Last 3 Encounters:   02/20/18 118/82   02/14/18 (!) 155/85   01/06/15 (!) 122/92     Pulse Readings from Last 3 Encounters:   02/20/18 60   02/14/18 92   01/06/15 64       /82 (BP Location: Left arm, Patient Position: Sitting, BP Method: Medium (Manual))   Pulse 60   Ht 5' 7" (1.702 m)   Wt 82.5 kg (181 lb 14.1 oz)   BMI 28.49 kg/m²     Objective:   Physical Exam   Constitutional: He is oriented to person, place, and time. He appears well-developed and well-nourished. No distress.   HENT:   Head: Normocephalic and atraumatic.   Nose: Nose normal.   Mouth/Throat: Oropharynx is clear and moist.   Eyes: Conjunctivae and EOM are normal. No scleral icterus.   Neck: Normal range of motion. Neck supple. No JVD present. No thyromegaly present.   Cardiovascular: Normal rate, regular rhythm, S1 normal and S2 normal.  Exam reveals no gallop, no S3, no S4 and no friction rub.    No murmur heard.  Pulmonary/Chest: Effort normal and breath sounds normal. No stridor. No respiratory distress. He has no wheezes. He has no rales. He exhibits no tenderness.   Abdominal: Soft. Bowel sounds are normal. He exhibits no distension and no mass. There is no tenderness. There is no rebound.   Genitourinary:   Genitourinary Comments: Deferred   Musculoskeletal: Normal range of motion. He exhibits no edema, tenderness or deformity.   Lymphadenopathy:     He has no cervical adenopathy.   Neurological: He is alert and oriented to person, place, and time. He exhibits normal muscle tone. Coordination normal.   Skin: Skin is warm and dry. No rash noted. He is not diaphoretic. No erythema. No pallor.   Psychiatric: He has a normal mood and affect. His behavior is normal. Judgment and thought content normal.   Nursing note and vitals reviewed.      Lab Results   Component Value Date     02/14/2018    K 3.7 02/14/2018     02/14/2018    CO2 21 (L) " 02/14/2018    BUN 14 02/14/2018    CREATININE 1.0 02/14/2018     (H) 02/14/2018    HGBA1C 6.4 (H) 11/07/2014    MG 2.1 11/10/2014    AST 32 02/14/2018    ALT 21 02/14/2018    ALBUMIN 3.7 02/14/2018    PROT 8.3 02/14/2018    BILITOT 1.9 (H) 02/14/2018    WBC 6.57 02/14/2018    HGB 16.6 02/14/2018    HCT 50.8 02/14/2018    MCV 96 02/14/2018     02/14/2018    INR 1.3 (H) 11/19/2014    TSH 4.36 (H) 01/13/2010     (H) 02/14/2018     Assessment:      1. Hypertensive CHF (congestive heart failure)    2. Acute on chronic systolic HF (heart failure)    3. Atrial fibrillation, permanent    4. Cardiomyopathy, nonischemic        Plan:   1. NICM EF 20-25% - pt euvolemic   - cont Coreg and lisinopril  - continue lasix and K supplement, check BMP  - will refer for ICD to EP    2. Chronic AF  - cont Coumadin and Coreg and Digoxin  - rate controlled  - needs to avoid alc  - needs coumadin clinic for INR    3. HTN - BP well controlled   - cont meds    Thank you for allowing me to participate in this patient's care. Please do not hesitate to contact me with any questions or concerns. Consult note has been forwarded to the referral physician.

## 2018-02-21 ENCOUNTER — ANTI-COAG VISIT (OUTPATIENT)
Dept: CARDIOLOGY | Facility: CLINIC | Age: 63
End: 2018-02-21

## 2018-02-21 ENCOUNTER — TELEPHONE (OUTPATIENT)
Dept: CARDIOLOGY | Facility: CLINIC | Age: 63
End: 2018-02-21

## 2018-02-21 VITALS
HEIGHT: 67 IN | HEART RATE: 93 BPM | SYSTOLIC BLOOD PRESSURE: 129 MMHG | WEIGHT: 182 LBS | OXYGEN SATURATION: 95 % | DIASTOLIC BLOOD PRESSURE: 89 MMHG | BODY MASS INDEX: 28.56 KG/M2 | TEMPERATURE: 98 F | RESPIRATION RATE: 16 BRPM

## 2018-02-21 PROCEDURE — 25000003 PHARM REV CODE 250: Performed by: EMERGENCY MEDICINE

## 2018-02-21 PROCEDURE — 63600175 PHARM REV CODE 636 W HCPCS: Performed by: EMERGENCY MEDICINE

## 2018-02-21 RX ADMIN — PHYTONADIONE 10 MG: 10 INJECTION, EMULSION INTRAMUSCULAR; INTRAVENOUS; SUBCUTANEOUS at 12:02

## 2018-02-21 NOTE — ED PROVIDER NOTES
"SCRIBE #1 NOTE: I, Michael Olivares, am scribing for, and in the presence of, Jennifer Hampton MD. I have scribed the entire note.      History      Chief Complaint   Patient presents with    Abnormal Lab     INR greater than 10. sent by Dr. Foreman       Review of patient's allergies indicates:  No Known Allergies     HPI   HPI    2/20/2018, 9:45 PM   History obtained from the patient      History of Present Illness: Ute Mcbride is a 62 y.o. male patient who presents to the Emergency Department for an evaluation of abnormal labs. Pt was reportedly contacted by his Dr. Foreman (cardiologist) concerning his INR which was greater than 10. Pt is also reportedly starting warfarin 5mg again after taking himself off of it for a while in favor of baby aspirin. Pt reports he "feels heavy" and a little SOB.  Pt takes warfarin for afib. Symptoms are constant and moderate in severity. Exacerbated by nothing and relieved by nothing. Associated sxs include SOB. Patient denies any fever, chills, CP, SOB, abd pain, N/V, recent travel/long car trip, and all other sxs at this time. No further complaints or concerns at this time.     Arrival mode: Personal vehicle    PCP: Kenneth Redding MD       Past Medical History:  Past Medical History:   Diagnosis Date    Anticoagulant long-term use     but has been noncompliant    Arthritis     Atrial fibrillation, chronic     Cardiomyopathy, nonischemic 11/9/2014    CHF (congestive heart failure)     Gout     Hypertension        Past Surgical History:  Past Surgical History:   Procedure Laterality Date    ANKLE SURGERY      CATARACT EXTRACTION W/  INTRAOCULAR LENS IMPLANT Bilateral          Family History:  Family history reviewed not relevant      Social History:  Social History     Social History Main Topics    Smoking status: Never Smoker    Smokeless tobacco: Never Used    Alcohol use Yes    Drug use: No    Sexual activity: Unknown       ROS   Review of Systems   Constitutional: " Negative for chills, diaphoresis and fever.        (-) Recent travel  (-) Long car trips   HENT: Negative for congestion, sore throat and trouble swallowing.    Respiratory: Positive for shortness of breath. Negative for cough and chest tightness.    Cardiovascular: Negative for chest pain and leg swelling.   Gastrointestinal: Negative for abdominal pain, nausea and vomiting.   Genitourinary: Negative for dysuria, frequency, hematuria and urgency.   Musculoskeletal: Negative for back pain, myalgias and neck stiffness.   Skin: Negative for rash.   Neurological: Negative for weakness, light-headedness and headaches.   Hematological: Does not bruise/bleed easily.     Physical Exam      Initial Vitals [02/20/18 1935]   BP Pulse Resp Temp SpO2   (!) 141/91 107 18 98.4 °F (36.9 °C) --      MAP       107.67          Physical Exam  Nursing Notes and Vital Signs Reviewed.  Constitutional: Patient is in no acute distress. Well-developed and well-nourished.  Head: Atraumatic. Normocephalic.  Eyes: PERRL. EOM intact. Conjunctivae are not pale. No scleral icterus.  ENT: Mucous membranes are moist. Oropharynx is clear and symmetric.    Neck: Supple. Full ROM. No lymphadenopathy.  Cardiovascular: Regular rate. Irregularly irregular rhythm.   Pulmonary/Chest: No respiratory distress. Clear to auscultation bilaterally. No wheezing or rales.  Abdominal: Soft and non-distended.  There is no tenderness.  No rebound, guarding, or rigidity. Good bowel sounds.  Genitourinary: No CVA tenderness  Musculoskeletal: Moves all extremities. No obvious deformities. No edema. Rheumatoid arthritic changes noted.   Skin: Warm and dry.  Neurological:  Alert, awake, and appropriate.  Normal speech.  No acute focal neurological deficits are appreciated.  Psychiatric: Normal affect. Good eye contact. Appropriate in content.    ED Course    Procedures  ED Vital Signs:  Vitals:    02/20/18 1935 02/20/18 2256 02/20/18 2334 02/21/18 0052   BP: (!) 141/91  "130/72  (!) 131/92   Pulse: 107 85 85 95   Resp: 18 16     Temp: 98.4 °F (36.9 °C) 98.1 °F (36.7 °C)  98 °F (36.7 °C)   TempSrc: Oral Oral  Oral   SpO2:  98%  96%   Weight: 82.6 kg (181 lb 15.8 oz)      Height: 5' 7" (1.702 m)       02/21/18 0137   BP: 129/89   Pulse: 93   Resp: 16   Temp: 98.1 °F (36.7 °C)   TempSrc: Oral   SpO2: 95%   Weight:    Height:        Abnormal Lab Results:  Labs Reviewed   PROTIME-INR - Abnormal; Notable for the following:        Result Value    Prothrombin Time >100.0 (*)     INR >10.0 (*)     All other components within normal limits    Narrative:     INR critical result(s) called and verbal readback obtained from   Jama Mcgrath RN, 02/20/2018 23:29   APTT - Abnormal; Notable for the following:     aPTT 41.1 (*)     All other components within normal limits   COMPREHENSIVE METABOLIC PANEL - Abnormal; Notable for the following:     Glucose 139 (*)     Total Bilirubin 1.6 (*)     All other components within normal limits   CBC W/ AUTO DIFFERENTIAL - Abnormal; Notable for the following:     MCH 31.1 (*)     RDW 15.0 (*)     Platelets 130 (*)     All other components within normal limits        All Lab Results:  Results for orders placed or performed during the hospital encounter of 02/20/18   Protime-INR   Result Value Ref Range    Prothrombin Time >100.0 (H) 9.0 - 12.5 sec    INR >10.0 (HH) 0.8 - 1.2   APTT   Result Value Ref Range    aPTT 41.1 (H) 21.0 - 32.0 sec   Comprehensive metabolic panel   Result Value Ref Range    Sodium 139 136 - 145 mmol/L    Potassium 3.7 3.5 - 5.1 mmol/L    Chloride 101 95 - 110 mmol/L    CO2 24 23 - 29 mmol/L    Glucose 139 (H) 70 - 110 mg/dL    BUN, Bld 10 8 - 23 mg/dL    Creatinine 0.8 0.5 - 1.4 mg/dL    Calcium 9.3 8.7 - 10.5 mg/dL    Total Protein 7.6 6.0 - 8.4 g/dL    Albumin 3.5 3.5 - 5.2 g/dL    Total Bilirubin 1.6 (H) 0.1 - 1.0 mg/dL    Alkaline Phosphatase 107 55 - 135 U/L    AST 23 10 - 40 U/L    ALT 19 10 - 44 U/L    Anion Gap 14 8 - 16 mmol/L "    eGFR if African American >60 >60 mL/min/1.73 m^2    eGFR if non African American >60 >60 mL/min/1.73 m^2   CBC auto differential   Result Value Ref Range    WBC 5.78 3.90 - 12.70 K/uL    RBC 5.18 4.60 - 6.20 M/uL    Hemoglobin 16.1 14.0 - 18.0 g/dL    Hematocrit 48.3 40.0 - 54.0 %    MCV 93 82 - 98 fL    MCH 31.1 (H) 27.0 - 31.0 pg    MCHC 33.3 32.0 - 36.0 g/dL    RDW 15.0 (H) 11.5 - 14.5 %    Platelets 130 (L) 150 - 350 K/uL    MPV 9.6 9.2 - 12.9 fL    Gran # (ANC) 3.6 1.8 - 7.7 K/uL    Lymph # 1.6 1.0 - 4.8 K/uL    Mono # 0.5 0.3 - 1.0 K/uL    Eos # 0.1 0.0 - 0.5 K/uL    Baso # 0.03 0.00 - 0.20 K/uL    Gran% 61.9 38.0 - 73.0 %    Lymph% 26.8 18.0 - 48.0 %    Mono% 9.2 4.0 - 15.0 %    Eosinophil% 1.6 0.0 - 8.0 %    Basophil% 0.5 0.0 - 1.9 %    Differential Method Automated                 The Emergency Provider reviewed the vital signs and test results, which are outlined above.    ED Discussion     11:58 PM: Dr. Hampton discussed the pt's case with Dr. Maldonado (Cardiology) who recommends giving pt vitamin K.    12:44 AM: Reassessed pt at this time. Pt is awake, alert, and in NAD. Pt states his condition has improved at this time. Discussed with pt all pertinent ED information and results. Discussed pt dx of warfarin-induced coagulopathy and plan of tx. Gave pt all f/u and return to the ED instructions. All questions and concerns were addressed at this time. Pt expresses understanding of information and instructions, and is comfortable with plan to discharge. Pt is stable for discharge.    I discussed with patient and/or family/caretaker that evaluation in the ED does not suggest any emergent or life threatening medical conditions requiring immediate intervention beyond what was provided in the ED, and I believe patient is safe for discharge.  Regardless, an unremarkable evaluation in the ED does not preclude the development or presence of a serious of life threatening condition. As such, patient was instructed to  return immediately for any worsening or change in current symptoms.      ED Medication(s):  Medications   phytonadione vitamin k (AQUA-MEPHYTON) 10 mg in dextrose 5 % 50 mL IVPB (0 mg Intravenous Stopped 2/21/18 0138)       Discharge Medication List as of 2/21/2018 12:42 AM          Follow-up Information     [facility] In 1 day.    Specialty:  Cardiology  Contact information:  [address] [facility] [address] [phone]           [facility] - .    Specialty:  Emergency Medicine  Why:  As needed  Contact information:  [address] [facility] [address] [phone]                   Medical Decision Making    Medical Decision Making:   Clinical Tests:   Lab Tests: Ordered and Reviewed           Scribe Attestation:   Scribe #1: I performed the above scribed service and the documentation accurately describes the services I performed. I attest to the accuracy of the note.    Attending:   Physician Attestation Statement for Scribe #1: I, Jennifer Hampton MD, personally performed the services described in this documentation, as scribed by Michael Olivares, in my presence, and it is both accurate and complete.          Clinical Impression       ICD-10-CM ICD-9-CM   1. Warfarin-induced coagulopathy D68.9 286.9    T45.515A E934.2       Disposition:   Disposition: Discharged  Condition: Stable         Jennifer Hampton MD  02/21/18 0357

## 2018-02-21 NOTE — TELEPHONE ENCOUNTER
----- Message from Chano Foreman MD sent at 2/21/2018  8:41 AM CST -----  Please call the patient regarding his abnormal result. He went to ED and received Vit K, can you please schedule him for Coumadin clinic within next day or two for repeat labwork?

## 2018-02-21 NOTE — PROGRESS NOTES
61 yo M PMH Atrial Fibrillation, chronic systolic HF, documented h/o alcohol abuse now enrolled into coumadin clinic monitoring. INR on 2/20 was supra-therapeutic. Patient was given vitamin k for rapid reversal and warfarin remains on hold. Spoke with patient this afternoon, he knows to continue to hold warfarin until INR is rechecked. He reports he is able to come to the clinic on Friday for repeat INR.

## 2018-02-21 NOTE — TELEPHONE ENCOUNTER
----- Message from Chano Foreman MD sent at 2/21/2018 11:09 AM CST -----  Is that the earliest? I ordered another INR can you have him get a repeat today or tomorrow at the latest, I still don't see how the ED discharged him. I don't think he will be safe until Monday without getting that INR down.. THanks  ----- Message -----  From: Latisha Kimball LPN  Sent: 2/21/2018  10:53 AM  To: Chano Foreman MD     Coumadin appt scheduled 2/26  ----- Message -----  From: Latisha Kimball LPN  Sent: 2/21/2018   9:21 AM  To: Kayli Sanchez LPN, #    Please advise  ----- Message -----  From: Chano Foreman MD  Sent: 2/21/2018   8:41 AM  To: Kellen Madden Staff    Please call the patient regarding his abnormal result. He went to ED and received Vit K, can you please schedule him for Coumadin clinic within next day or two for repeat labwork?

## 2018-02-21 NOTE — ED NOTES
Bed: 17  Expected date:   Expected time:   Means of arrival:   Comments:  Reed when cleaned     Sujatha Wills RN  02/20/18 1979

## 2018-02-23 ENCOUNTER — TELEPHONE (OUTPATIENT)
Dept: CARDIOLOGY | Facility: CLINIC | Age: 63
End: 2018-02-23

## 2018-02-27 ENCOUNTER — ANTI-COAG VISIT (OUTPATIENT)
Dept: CARDIOLOGY | Facility: CLINIC | Age: 63
End: 2018-02-27
Payer: MEDICARE

## 2018-02-27 DIAGNOSIS — Z79.01 LONG TERM (CURRENT) USE OF ANTICOAGULANTS: Primary | ICD-10-CM

## 2018-02-27 LAB — INR PPP: 1.8 (ref 2–3)

## 2018-02-27 PROCEDURE — 99999 PR PBB SHADOW E&M-EST. PATIENT-LVL I: CPT | Mod: PBBFAC,,,

## 2018-02-27 PROCEDURE — 99211 OFF/OP EST MAY X REQ PHY/QHP: CPT | Mod: PBBFAC

## 2018-02-27 PROCEDURE — 85610 PROTHROMBIN TIME: CPT | Mod: PBBFAC

## 2018-02-27 NOTE — PROGRESS NOTES
INR today is 1.8 after a supra-therapeutic INR (>10) reading on last week. Patient confirms no warfarin since last check. He is a poor historian of the time frame of last warfarin dosage however. Will resume warfarin today at 2.5mg daily. Repeat INR on Friday. Patient and wife verbalized understanding.  Patient has been Coumadin counseled. Counseling included indication for use, importance of strict monitoring with clinic, importance of compliance, what to do if any missed doses, side effects associated with warfarin, drug interactions and drug-food interactions. All parties verbalized understanding. All questions/concerns were addressed.

## 2018-03-02 ENCOUNTER — ANTI-COAG VISIT (OUTPATIENT)
Dept: CARDIOLOGY | Facility: CLINIC | Age: 63
End: 2018-03-02
Payer: MEDICARE

## 2018-03-02 DIAGNOSIS — Z79.01 LONG TERM (CURRENT) USE OF ANTICOAGULANTS: Primary | ICD-10-CM

## 2018-03-02 LAB — INR PPP: 2 (ref 2–3)

## 2018-03-02 PROCEDURE — 99211 OFF/OP EST MAY X REQ PHY/QHP: CPT | Mod: PBBFAC

## 2018-03-02 PROCEDURE — 85610 PROTHROMBIN TIME: CPT | Mod: PBBFAC

## 2018-03-02 PROCEDURE — 99999 PR PBB SHADOW E&M-EST. PATIENT-LVL I: CPT | Mod: PBBFAC,,,

## 2018-03-02 RX ORDER — WARFARIN 2 MG/1
TABLET ORAL
Qty: 30 TABLET | Refills: 3 | Status: SHIPPED | OUTPATIENT
Start: 2018-03-02 | End: 2019-04-02 | Stop reason: SDUPTHER

## 2018-03-02 NOTE — PROGRESS NOTES
INR is now therapeutic. Will begin new adjusted dose until follow-up. Repeat INR in 1 week. Patient and wife verbalized understanding.

## 2018-03-08 ENCOUNTER — LAB VISIT (OUTPATIENT)
Dept: LAB | Facility: HOSPITAL | Age: 63
End: 2018-03-08
Attending: INTERNAL MEDICINE
Payer: MEDICARE

## 2018-03-08 ENCOUNTER — INITIAL CONSULT (OUTPATIENT)
Dept: CARDIOLOGY | Facility: CLINIC | Age: 63
End: 2018-03-08
Payer: MEDICARE

## 2018-03-08 VITALS
HEART RATE: 78 BPM | HEIGHT: 67 IN | BODY MASS INDEX: 27.64 KG/M2 | SYSTOLIC BLOOD PRESSURE: 132 MMHG | DIASTOLIC BLOOD PRESSURE: 88 MMHG | WEIGHT: 176.13 LBS

## 2018-03-08 DIAGNOSIS — I42.8 CARDIOMYOPATHY, NONISCHEMIC: ICD-10-CM

## 2018-03-08 DIAGNOSIS — I48.21 ATRIAL FIBRILLATION, PERMANENT: ICD-10-CM

## 2018-03-08 DIAGNOSIS — I48.91 ATRIAL FIBRILLATION WITH RVR: Primary | ICD-10-CM

## 2018-03-08 LAB
INR PPP: 1.9
PROTHROMBIN TIME: 18.5 SEC

## 2018-03-08 PROCEDURE — 93010 ELECTROCARDIOGRAM REPORT: CPT | Mod: S$PBB,,, | Performed by: INTERNAL MEDICINE

## 2018-03-08 PROCEDURE — 85610 PROTHROMBIN TIME: CPT

## 2018-03-08 PROCEDURE — 99999 PR PBB SHADOW E&M-EST. PATIENT-LVL III: CPT | Mod: PBBFAC,,, | Performed by: INTERNAL MEDICINE

## 2018-03-08 PROCEDURE — 36415 COLL VENOUS BLD VENIPUNCTURE: CPT

## 2018-03-08 PROCEDURE — 99213 OFFICE O/P EST LOW 20 MIN: CPT | Mod: PBBFAC,25 | Performed by: INTERNAL MEDICINE

## 2018-03-08 PROCEDURE — 99205 OFFICE O/P NEW HI 60 MIN: CPT | Mod: S$PBB,,, | Performed by: INTERNAL MEDICINE

## 2018-03-08 PROCEDURE — 93005 ELECTROCARDIOGRAM TRACING: CPT | Mod: PBBFAC | Performed by: INTERNAL MEDICINE

## 2018-03-08 RX ORDER — AMIODARONE HYDROCHLORIDE 200 MG/1
200 TABLET ORAL 2 TIMES DAILY
Qty: 60 TABLET | Refills: 5 | Status: ON HOLD | OUTPATIENT
Start: 2018-03-08 | End: 2018-03-22 | Stop reason: HOSPADM

## 2018-03-08 NOTE — Clinical Note
Cortney,   Can we arrange for a PEGGY/CV with Dr Foreman for Mr Mcbride. Afterwards, I would like to start amiodarone 200 mg bid- Rx sent to his pharmacy with plans to start after his CV.   Thanks, MB

## 2018-03-08 NOTE — LETTER
March 8, 2018      Chano Foreman MD  [address] [facility]  [place] [address]           [facility]  [address] [place] [address] , [address]  [place] [address]  Phone: [phone]  Fax: [phone]          Patient: Ute Mcbride   MR Number: [id number]   YOB: 1955   Date of Visit: 3/8/2018       Dear Dr. Chano Foreman:    Thank you for referring Ute Mcbride to me for evaluation. Attached you will find relevant portions of my assessment and plan of care.    If you have questions, please do not hesitate to call me. I look forward to following Ute Mcbride along with you.    Sincerely,    Mario Lou MD    Enclosure  CC:  No Recipients    If you would like to receive this communication electronically, please contact [email or web address] or [phone] to request more information on [employer] Link access.    For providers and/or their staff who would like to refer a patient to [facility], please contact us through our one-stop-shop provider referral line, [facility], at [phone].    If you feel you have received this communication in error or would no longer like to receive these types of communications, please e-mail [email or web address]

## 2018-03-08 NOTE — PROGRESS NOTES
Subjective:    Patient ID:  Ute Mcbirde is a 62 y.o. male who presents for evaluation of Atrial Fibrillation      62 yoM long-standing persistent AF, HTN, NICM EF 20-25% here for AF management. He has had AF for many years with progressively declining EF. He has been on warfarin for CVA prophylaxis. He has never undergone CV, RFA and has never taken AAD therapy. He quit smoking 20 years ago but still consumes 3 beers a day. No history of CVA/TIA, PVD, DM. He denies syncope, near syncope. He has stable NYHA Class II symptoms. He suffers from severe gout. He is on digoxin 250 mcg qd as well as coreg 6.25 mg bid.     2D ECHO 2/14/18  CONCLUSIONS     1 - Severely depressed left ventricular systolic function (EF 20-25%).     2 - Impaired LV relaxation, increased LVEDP.     3 - Severe left atrial enlargement.     4 - Eccentric hypertrophy.     5 - Right ventricular enlargement with moderately to severely depressed systolic function.     6 - Pulmonary hypertension. The estimated PA systolic pressure is greater than 62 mmHg.     7 - Mild mitral regurgitation.     8 - Moderate tricuspid regurgitation.     9 - Mild pulmonic regurgitation.      Past Medical History:  No date: Anticoagulant long-term use      Comment: but has been noncompliant  No date: Arthritis  No date: Atrial fibrillation, chronic  11/9/2014: Cardiomyopathy, nonischemic  No date: CHF (congestive heart failure)  No date: Gout  No date: Hypertension    Past Surgical History:  No date: ANKLE SURGERY  No date: CATARACT EXTRACTION W/  INTRAOCULAR LENS IMPLA* Bilateral    Social History    Marital status:              Spouse name:                       Years of education:                 Number of children:               Occupational History    None on file    Social History Main Topics    Smoking status: Never Smoker                                                                Smokeless tobacco: Never Used                        Alcohol use: Yes              Drug use: No              Sexual activity: Not on file          Other Topics            Concern    None on file    Social History Narrative    None on file    History reviewed.  No pertinent family history.          Review of Systems   Constitution: Positive for weakness and malaise/fatigue.   HENT: Negative.    Eyes: Negative.    Cardiovascular: Positive for dyspnea on exertion and palpitations. Negative for chest pain, leg swelling, near-syncope and syncope.   Respiratory: Negative.  Negative for shortness of breath.    Endocrine: Negative.    Hematologic/Lymphatic: Negative.    Skin: Negative.    Musculoskeletal: Negative.    Gastrointestinal: Negative.    Genitourinary: Negative.    Neurological: Negative for dizziness and light-headedness.   Psychiatric/Behavioral: Negative.    Allergic/Immunologic: Negative.         Objective:    Physical Exam   Constitutional: He is oriented to person, place, and time. He appears well-developed and well-nourished. No distress.   HENT:   Head: Normocephalic and atraumatic.   Nose: Nose normal.   Mouth/Throat: Oropharynx is clear and moist.   Eyes: Conjunctivae and EOM are normal. No scleral icterus.   Neck: Normal range of motion. Neck supple. No JVD present. No thyromegaly present.   Cardiovascular: Normal rate, S1 normal and S2 normal.  An irregularly irregular rhythm present. Exam reveals no gallop, no S3, no S4 and no friction rub.    No murmur heard.  Pulmonary/Chest: Effort normal and breath sounds normal. No stridor. No respiratory distress. He has no wheezes. He has no rales. He exhibits no tenderness.   Abdominal: Soft. Bowel sounds are normal. He exhibits no distension and no mass. There is no tenderness. There is no rebound.   Genitourinary:   Genitourinary Comments: Deferred   Musculoskeletal: Normal range of motion. He exhibits no edema, tenderness or deformity.   Lymphadenopathy:     He has no cervical adenopathy.   Neurological: He is alert and  oriented to person, place, and time. He exhibits normal muscle tone. Coordination normal.   Skin: Skin is warm and dry. No rash noted. He is not diaphoretic. No erythema. No pallor.   Psychiatric: He has a normal mood and affect. His behavior is normal. Judgment and thought content normal.   Nursing note and vitals reviewed.    ECG: AF with controlled V rate, IVCD        Assessment:       1. Atrial fibrillation with RVR    2. Cardiomyopathy, nonischemic         Plan:       62 yoM with long-standing persistent AF, NICM EF 20-25% here for arrhythmia management. I discussed AF and its basic pathophysiology, including its health implications and treatment options with the patient. Specifically, I addressed the need for CVA prophylaxis as well as the goal to reduce symptomatic arrhythmic episodes by pharmacologic and/or procedural methods. I suspect he may have arrhythmia induced CM or his CM could be improved with restoration of NSR. I would recommend attempt at PEGGY/CV with initiation of amiodarone 200 mg bid afterwards. Will arrange with Dr Foreman. Follow up in 6 weeks.

## 2018-03-09 ENCOUNTER — ANTI-COAG VISIT (OUTPATIENT)
Dept: CARDIOLOGY | Facility: CLINIC | Age: 63
End: 2018-03-09

## 2018-03-09 NOTE — PROGRESS NOTES
INR is slightly sub-therapeutic. Will re-challenge recently adjusted dose until follow-up. Repeat INR in 2 weeks. No answer/busy on primary number.

## 2018-03-12 NOTE — PROGRESS NOTES
Patient reporting a larger dose than instructed this past weekend. Will repeat INR on 3/16 instead of 3/22. Patient voiced understanding.

## 2018-03-15 ENCOUNTER — TELEPHONE (OUTPATIENT)
Dept: CARDIOLOGY | Facility: CLINIC | Age: 63
End: 2018-03-15

## 2018-03-15 NOTE — TELEPHONE ENCOUNTER
----- Message from Chano Foreman MD sent at 3/13/2018  9:40 AM CDT -----  I can perform anytime afternoon next week will be in hospital, unless he wants to do it around 8 AM that works too. Thanks    - PM  ----- Message -----  From: Mario Lou MD  Sent: 3/8/2018  11:44 AM  To: Chano Foreman MD    C[place]ney,     Can we arrange for a PEGGY/CV with Dr Foreman for Mr Mcbride. Afterwards, I would like to start amiodarone 200 mg bid- Rx sent to his pharmacy with plans to start after his CV.     Thanks, MB

## 2018-03-16 ENCOUNTER — ANTI-COAG VISIT (OUTPATIENT)
Dept: CARDIOLOGY | Facility: CLINIC | Age: 63
End: 2018-03-16
Payer: MEDICARE

## 2018-03-16 DIAGNOSIS — Z79.01 LONG TERM (CURRENT) USE OF ANTICOAGULANTS: Primary | ICD-10-CM

## 2018-03-16 LAB — INR PPP: 1.8 (ref 2–3)

## 2018-03-16 PROCEDURE — 99999 PR PBB SHADOW E&M-EST. PATIENT-LVL I: CPT | Mod: PBBFAC,,,

## 2018-03-16 PROCEDURE — 85610 PROTHROMBIN TIME: CPT | Mod: PBBFAC

## 2018-03-16 PROCEDURE — 99211 OFF/OP EST MAY X REQ PHY/QHP: CPT | Mod: PBBFAC

## 2018-03-16 NOTE — TELEPHONE ENCOUNTER
Confirmed PEGGY/CV for Thursday 3/22/18 for 8am  Instructions reviewed NPO status, arrival time 6:30am, answered all questions and given direct call back number

## 2018-03-16 NOTE — PROGRESS NOTES
INR remains sub-therapeutic. Will increase total weekly dose. Repeat INR in 1 week. Patient voiced understanding.

## 2018-03-22 ENCOUNTER — ANESTHESIA (OUTPATIENT)
Dept: CARDIOLOGY | Facility: HOSPITAL | Age: 63
End: 2018-03-22
Payer: MEDICARE

## 2018-03-22 ENCOUNTER — SURGERY (OUTPATIENT)
Age: 63
End: 2018-03-22

## 2018-03-22 ENCOUNTER — ANESTHESIA EVENT (OUTPATIENT)
Dept: CARDIOLOGY | Facility: HOSPITAL | Age: 63
End: 2018-03-22
Payer: MEDICARE

## 2018-03-22 ENCOUNTER — HOSPITAL ENCOUNTER (OUTPATIENT)
Facility: HOSPITAL | Age: 63
Discharge: HOME | End: 2018-03-22
Attending: INTERNAL MEDICINE | Admitting: INTERNAL MEDICINE
Payer: MEDICARE

## 2018-03-22 VITALS
HEART RATE: 123 BPM | DIASTOLIC BLOOD PRESSURE: 98 MMHG | OXYGEN SATURATION: 98 % | HEIGHT: 67 IN | TEMPERATURE: 98 F | BODY MASS INDEX: 27.62 KG/M2 | RESPIRATION RATE: 19 BRPM | SYSTOLIC BLOOD PRESSURE: 139 MMHG | WEIGHT: 176 LBS

## 2018-03-22 DIAGNOSIS — I48.91 AF (ATRIAL FIBRILLATION): ICD-10-CM

## 2018-03-22 DIAGNOSIS — I48.91 ATRIAL FIBRILLATION: ICD-10-CM

## 2018-03-22 LAB
AORTIC ATHEROMA: YES
MITRAL VALVE MOBILITY: NORMAL
MITRAL VALVE REGURGITATION: ABNORMAL
RETIRED EF AND QEF - SEE NOTES: 20 (ref 55–65)
TRICUSPID VALVE REGURGITATION: ABNORMAL

## 2018-03-22 PROCEDURE — 93312 ECHO TRANSESOPHAGEAL: CPT | Mod: 26,,, | Performed by: INTERNAL MEDICINE

## 2018-03-22 PROCEDURE — 93010 ELECTROCARDIOGRAM REPORT: CPT | Mod: 59,,, | Performed by: INTERNAL MEDICINE

## 2018-03-22 PROCEDURE — 63600175 PHARM REV CODE 636 W HCPCS: Performed by: NURSE ANESTHETIST, CERTIFIED REGISTERED

## 2018-03-22 PROCEDURE — 25000003 PHARM REV CODE 250

## 2018-03-22 PROCEDURE — 25000003 PHARM REV CODE 250: Performed by: NURSE ANESTHETIST, CERTIFIED REGISTERED

## 2018-03-22 PROCEDURE — 93005 ELECTROCARDIOGRAM TRACING: CPT

## 2018-03-22 PROCEDURE — 37000008 HC ANESTHESIA 1ST 15 MINUTES: Performed by: INTERNAL MEDICINE

## 2018-03-22 PROCEDURE — 63600175 PHARM REV CODE 636 W HCPCS

## 2018-03-22 PROCEDURE — 93320 DOPPLER ECHO COMPLETE: CPT | Mod: 26,,, | Performed by: INTERNAL MEDICINE

## 2018-03-22 PROCEDURE — 93325 DOPPLER ECHO COLOR FLOW MAPG: CPT | Mod: 26,,, | Performed by: INTERNAL MEDICINE

## 2018-03-22 PROCEDURE — 37000009 HC ANESTHESIA EA ADD 15 MINS: Performed by: INTERNAL MEDICINE

## 2018-03-22 PROCEDURE — 93325 DOPPLER ECHO COLOR FLOW MAPG: CPT

## 2018-03-22 RX ORDER — MIDAZOLAM HYDROCHLORIDE 1 MG/ML
INJECTION, SOLUTION INTRAMUSCULAR; INTRAVENOUS
Status: DISCONTINUED | OUTPATIENT
Start: 2018-03-22 | End: 2018-03-22

## 2018-03-22 RX ORDER — PROPOFOL 10 MG/ML
VIAL (ML) INTRAVENOUS
Status: DISCONTINUED | OUTPATIENT
Start: 2018-03-22 | End: 2018-03-22

## 2018-03-22 RX ORDER — GLYCOPYRROLATE 0.2 MG/ML
INJECTION INTRAMUSCULAR; INTRAVENOUS
Status: DISCONTINUED | OUTPATIENT
Start: 2018-03-22 | End: 2018-03-22

## 2018-03-22 RX ORDER — KETAMINE HYDROCHLORIDE 100 MG/ML
INJECTION, SOLUTION INTRAMUSCULAR; INTRAVENOUS
Status: DISCONTINUED | OUTPATIENT
Start: 2018-03-22 | End: 2018-03-22

## 2018-03-22 RX ORDER — SODIUM CHLORIDE 9 MG/ML
INJECTION, SOLUTION INTRAVENOUS CONTINUOUS PRN
Status: DISCONTINUED | OUTPATIENT
Start: 2018-03-22 | End: 2018-03-22

## 2018-03-22 RX ORDER — LIDOCAINE HYDROCHLORIDE 20 MG/ML
SOLUTION OROPHARYNGEAL
Status: DISCONTINUED | OUTPATIENT
Start: 2018-03-22 | End: 2018-03-22

## 2018-03-22 RX ADMIN — SODIUM CHLORIDE: 0.9 INJECTION, SOLUTION INTRAVENOUS at 08:03

## 2018-03-22 RX ADMIN — GLYCOPYRROLATE 0.1 MG: 0.2 INJECTION INTRAMUSCULAR; INTRAVENOUS at 08:03

## 2018-03-22 RX ADMIN — KETAMINE HYDROCHLORIDE 10 MG: 100 INJECTION, SOLUTION, CONCENTRATE INTRAMUSCULAR; INTRAVENOUS at 08:03

## 2018-03-22 RX ADMIN — MIDAZOLAM HYDROCHLORIDE 2 MG: 1 INJECTION, SOLUTION INTRAMUSCULAR; INTRAVENOUS at 08:03

## 2018-03-22 RX ADMIN — KETAMINE HYDROCHLORIDE 20 MG: 100 INJECTION, SOLUTION, CONCENTRATE INTRAMUSCULAR; INTRAVENOUS at 08:03

## 2018-03-22 RX ADMIN — PROPOFOL 10 MG: 10 INJECTION, EMULSION INTRAVENOUS at 08:03

## 2018-03-22 RX ADMIN — LIDOCAINE HYDROCHLORIDE 10 ML: 20 SOLUTION ORAL; TOPICAL at 08:03

## 2018-03-22 NOTE — TRANSFER OF CARE
"Anesthesia Transfer of Care Note    Patient: Ute Mcbride    Procedure(s) Performed: Procedure(s) (LRB):  TRANSESOPHAGEAL ECHOCARDIOGRAM (PEGGY) (N/A)    Patient location: Other: cvru    Anesthesia Type: MAC    Post pain: adequate analgesia    Post assessment: no apparent anesthetic complications    Post vital signs: stable    Level of consciousness: responds to stimulation    Nausea/Vomiting: no nausea/vomiting    Complications: none    Transfer of care protocol was followed      Last vitals:   Visit Vitals  BP (!) 139/98   Pulse (!) 123   Temp 36.6 °C (97.9 °F) (Oral)   Resp 19   Ht 5' 7" (1.702 m)   Wt 79.8 kg (176 lb)   SpO2 98%   BMI 27.57 kg/m²     "

## 2018-03-22 NOTE — ANESTHESIA PREPROCEDURE EVALUATION
03/22/2018  Ute Mcbride is a 62 y.o., male.    This is a 62 year old male pt with PMHx NICM EF 25%, Chronic AF not previously on a/c due to GID, HTN, alc abuse in past presented to ED 2/14/18 with chief complaint of getting weak, sweating, palpitations    Anesthesia Evaluation    I have reviewed the Patient Summary Reports.    I have reviewed the Nursing Notes.   I have reviewed the Medications.     Review of Systems  Social:  Non-Smoker    Cardiovascular:   Hypertension CHF (Ef 20%; ) He had 2D ECHO which revealed LVEF 20% as in past with PH with PASP 62 mmHg along with moderate/severe RV dysfunction.    Pulmonary:  Pulmonary Normal    Renal/:  Renal/ Normal     Hepatic/GI:  Hepatic/GI Normal    Musculoskeletal:   gout   Neurological:  Neurology Normal        Physical Exam   Airway/Jaw/Neck:  Airway Findings: Mallampati: II     Dental:  Dental Findings:Poor dentition, 9 teeth remaining   Chest/Lungs:  Chest/Lungs Findings: Clear to auscultation, Normal Respiratory Rate     Heart/Vascular:  Heart Findings: Rhythm: Irregularly Irregular             Anesthesia Plan  Type of Anesthesia, risks & benefits discussed:  Anesthesia Type:  MAC  Patient's Preference:   Intra-op Monitoring Plan:   Intra-op Monitoring Plan Comments:   Post Op Pain Control Plan:   Post Op Pain Control Plan Comments:   Induction:   IV  Beta Blocker:  Patient is on a Beta-Blocker and has received one dose within the past 24 hours (No further documentation required).       Informed Consent: Patient understands risks and agrees with Anesthesia plan.  Questions answered. Anesthesia consent signed with patient.  ASA Score: 4     Day of Surgery Review of History & Physical: I have interviewed and examined the patient. I have reviewed the patient's H&P dated:  There are no significant changes.          Ready For Surgery From Anesthesia  Perspective.

## 2018-03-22 NOTE — ADDENDUM NOTE
Addendum  created 03/22/18 1034 by Hortensia Patrick MD    Anesthesia Review and Sign - Signed

## 2018-03-22 NOTE — PLAN OF CARE
0920 Rosario of 10.  Sitting up without dizziness.  0930 discharge instructions reviewed.  Copy given to patient.  IV removed.  0935 discharged home with wife

## 2018-03-22 NOTE — ANESTHESIA POSTPROCEDURE EVALUATION
"Anesthesia Post Evaluation    Patient: Ute Mcbride    Procedure(s) Performed: Procedure(s) (LRB):  TRANSESOPHAGEAL ECHOCARDIOGRAM (PEGGY) (N/A)    Final Anesthesia Type: MAC  Patient location: [facility].  Patient participation: Yes- Able to Participate  Level of consciousness: awake and alert  Post-procedure vital signs: reviewed and stable  Pain management: adequate  Airway patency: patent  PONV status at discharge: No PONV  Anesthetic complications: no      Cardiovascular status: hemodynamically stable  Respiratory status: unassisted and spontaneous ventilation  Hydration status: euvolemic  Follow-up not needed.        Visit Vitals  BP (!) 139/98   Pulse (!) 123   Temp 36.6 °C (97.9 °F) (Oral)   Resp 19   Ht 5' 7" (1.702 m)   Wt 79.8 kg (176 lb)   SpO2 98%   BMI 27.57 kg/m²       Pain/Rosario Score: No Data Recorded      "

## 2018-03-22 NOTE — BRIEF OP NOTE
Surgeon/Physician: Chano Foreman MD     Pre Op Diagnosis: Afib r/o thrombus    Post OP Diagnosis: Positive VERONICA thrombus; Positive smoke     Procedure Performed: Transesophageal echocardiogram     Procedure Description: The risks, benefits, and alternatives of the procedure expalined in detail with patient and family. The patient voices understanding and all questions have been addressed. The patient agrees to proceed as planned.   The patient was sedated by anesthesiologist. The probe was advanced via throat into esophagus smoothly. The patient tolerated the procedure well and there was no complications. The probed was withdrawal at the end of study. The patient was hemodynamically stable.     Estimated Blood Loss: none.     Findings / Operative Note:   Positive VERONICA thrombus noted + LA smoke    Did not attempt DCCV    Will return to coumadin clinic and follow up for further management.     Ok to discharge to home once hemodynamically stable.    F/U with me in one week at cardiology clinic.    Chano Foreman MD  Cardiovascular Disease  [facility], [place]  [phone] (P)

## 2018-03-22 NOTE — DISCHARGE SUMMARY
[facility] -   Cardiology  Discharge Summary      Patient Name: Ute Mcbride  MRN: [id number]  Admission Date: 3/22/2018  Hospital Length of Stay: 0 days  Discharge Date and Time:  03/22/2018 8:35 AM  Attending Physician: Chano Foreman MD   Discharging Provider: Chano Foreman Md, MD  Primary Care Provider: Kenneth Redding MD        HPI: 62 yoM long-standing persistent AF, HTN, NICM EF 20-25% here for AF management. He has had AF for many years with progressively declining EF. He has been on warfarin for CVA prophylaxis. He has never undergone CV, RFA and has never taken AAD therapy. He quit smoking 20 years ago but still consumes 3 beers a day. No history of CVA/TIA, PVD, DM. He denies syncope, near syncope. He has stable NYHA Class II symptoms. He suffers from severe gout. He is on digoxin 250 mcg qd as well as coreg 6.25 mg bid.   Here for PEGGY/DCCV.     Procedure(s) (LRB):  TRANSESOPHAGEAL ECHOCARDIOGRAM (PEGGY) (N/A)      Hospital Course: PEGGY performed, positive for thrombus, DCCV not attempted    Consults:     Final Active Diagnoses:    Diagnosis Date Noted POA    Atrial fibrillation, permanent [I48.2] 06/12/2014 Yes      Problems Resolved During this Admission:    Diagnosis Date Noted Date Resolved POA      Discharged Condition: good    Disposition:     Follow Up:    Patient Instructions:   No discharge procedures on file.  Medications:  Reconciled Home Medications:   Current Discharge Medication List      CONTINUE these medications which have NOT CHANGED    Details   amiodarone (PACERONE) 200 MG Tab Take 1 tablet (200 mg total) by mouth 2 (two) times daily. Start after your cardioversion  Qty: 60 tablet, Refills: 5    Associated Diagnoses: Atrial fibrillation with RVR; Cardiomyopathy, nonischemic      carvedilol (COREG) 6.25 MG tablet Take 1 tablet (6.25 mg total) by mouth 2 (two) times daily with meals.  Qty: 60 tablet, Refills: 3    Associated Diagnoses: Cardiomyopathy, nonischemic      digoxin  (LANOXIN) 250 mcg tablet Take 1 tablet (250 mcg total) by mouth once daily.  Qty: 90 tablet, Refills: 3    Associated Diagnoses: Atrial fibrillation, permanent      furosemide (LASIX) 40 MG tablet Take 1 tablet (40 mg total) by mouth 2 (two) times daily.  Qty: 30 tablet, Refills: 0    Associated Diagnoses: Cardiomyopathy, nonischemic      lisinopril 10 MG tablet Take 1 tablet (10 mg total) by mouth once daily.  Qty: 90 tablet, Refills: 3    Associated Diagnoses: Hypertensive CHF (congestive heart failure); Acute on chronic systolic HF (heart failure)      pantoprazole (PROTONIX) 40 MG tablet Take 1 tablet (40 mg total) by mouth once daily.  Qty: 90 tablet, Refills: 0    Comments: **Patient requests 90 days supply**      potassium chloride (KLOR-CON) 10 MEQ TbSR Take 2 tablets (20 mEq total) by mouth once daily.  Qty: 90 tablet, Refills: 3      warfarin (COUMADIN) 2 MG tablet Take 1/2 tablet (1mg) on Saturday and Sunday and 1 tablet (2mg) all other days as directed by coumadin clinic. Discontinue 5mg tablet.  Qty: 30 tablet, Refills: 3    Associated Diagnoses: Long term (current) use of anticoagulants             Significant Diagnostic Studies: Labs: All labs within the past 24 hours have been reviewed    Pending Diagnostic Studies:     Procedure Component Value Units Date/Time    Transesophageal echo [[id number]]     Order Status:  Sent Lab Status:  No result         Indwelling Lines/Drains at time of discharge:   Lines/Drains/Airways     Airway                 Airway - Non-Surgical 03/22/18 0819 Nasal Cannula less than 1 day                Time spent on the discharge of patient: 35 minutes  Patient was seen and examined on the date of discharge and determined to be suitable for discharge.         Chano Foreman Md, MD  Department of Hospital Medicine  [facility]

## 2018-03-22 NOTE — H&P (VIEW-ONLY)
Subjective:    Patient ID:  Ute Mcbride is a 62 y.o. male who presents for evaluation of Atrial Fibrillation      62 yoM long-standing persistent AF, HTN, NICM EF 20-25% here for AF management. He has had AF for many years with progressively declining EF. He has been on warfarin for CVA prophylaxis. He has never undergone CV, RFA and has never taken AAD therapy. He quit smoking 20 years ago but still consumes 3 beers a day. No history of CVA/TIA, PVD, DM. He denies syncope, near syncope. He has stable NYHA Class II symptoms. He suffers from severe gout. He is on digoxin 250 mcg qd as well as coreg 6.25 mg bid.     2D ECHO 2/14/18  CONCLUSIONS     1 - Severely depressed left ventricular systolic function (EF 20-25%).     2 - Impaired LV relaxation, increased LVEDP.     3 - Severe left atrial enlargement.     4 - Eccentric hypertrophy.     5 - Right ventricular enlargement with moderately to severely depressed systolic function.     6 - Pulmonary hypertension. The estimated PA systolic pressure is greater than 62 mmHg.     7 - Mild mitral regurgitation.     8 - Moderate tricuspid regurgitation.     9 - Mild pulmonic regurgitation.      Past Medical History:  No date: Anticoagulant long-term use      Comment: but has been noncompliant  No date: Arthritis  No date: Atrial fibrillation, chronic  11/9/2014: Cardiomyopathy, nonischemic  No date: CHF (congestive heart failure)  No date: Gout  No date: Hypertension    Past Surgical History:  No date: ANKLE SURGERY  No date: CATARACT EXTRACTION W/  INTRAOCULAR LENS IMPLA* Bilateral    Social History    Marital status:              Spouse name:                       Years of education:                 Number of children:               Occupational History    None on file    Social History Main Topics    Smoking status: Never Smoker                                                                Smokeless tobacco: Never Used                        Alcohol use: Yes              Drug use: No              Sexual activity: Not on file          Other Topics            Concern    None on file    Social History Narrative    None on file    History reviewed.  No pertinent family history.          Review of Systems   Constitution: Positive for weakness and malaise/fatigue.   HENT: Negative.    Eyes: Negative.    Cardiovascular: Positive for dyspnea on exertion and palpitations. Negative for chest pain, leg swelling, near-syncope and syncope.   Respiratory: Negative.  Negative for shortness of breath.    Endocrine: Negative.    Hematologic/Lymphatic: Negative.    Skin: Negative.    Musculoskeletal: Negative.    Gastrointestinal: Negative.    Genitourinary: Negative.    Neurological: Negative for dizziness and light-headedness.   Psychiatric/Behavioral: Negative.    Allergic/Immunologic: Negative.         Objective:    Physical Exam   Constitutional: He is oriented to person, place, and time. He appears well-developed and well-nourished. No distress.   HENT:   Head: Normocephalic and atraumatic.   Nose: Nose normal.   Mouth/Throat: Oropharynx is clear and moist.   Eyes: Conjunctivae and EOM are normal. No scleral icterus.   Neck: Normal range of motion. Neck supple. No JVD present. No thyromegaly present.   Cardiovascular: Normal rate, S1 normal and S2 normal.  An irregularly irregular rhythm present. Exam reveals no gallop, no S3, no S4 and no friction rub.    No murmur heard.  Pulmonary/Chest: Effort normal and breath sounds normal. No stridor. No respiratory distress. He has no wheezes. He has no rales. He exhibits no tenderness.   Abdominal: Soft. Bowel sounds are normal. He exhibits no distension and no mass. There is no tenderness. There is no rebound.   Genitourinary:   Genitourinary Comments: Deferred   Musculoskeletal: Normal range of motion. He exhibits no edema, tenderness or deformity.   Lymphadenopathy:     He has no cervical adenopathy.   Neurological: He is alert and  oriented to person, place, and time. He exhibits normal muscle tone. Coordination normal.   Skin: Skin is warm and dry. No rash noted. He is not diaphoretic. No erythema. No pallor.   Psychiatric: He has a normal mood and affect. His behavior is normal. Judgment and thought content normal.   Nursing note and vitals reviewed.    ECG: AF with controlled V rate, IVCD        Assessment:       1. Atrial fibrillation with RVR    2. Cardiomyopathy, nonischemic         Plan:       62 yoM with long-standing persistent AF, NICM EF 20-25% here for arrhythmia management. I discussed AF and its basic pathophysiology, including its health implications and treatment options with the patient. Specifically, I addressed the need for CVA prophylaxis as well as the goal to reduce symptomatic arrhythmic episodes by pharmacologic and/or procedural methods. I suspect he may have arrhythmia induced CM or his CM could be improved with restoration of NSR. I would recommend attempt at PEGGY/CV with initiation of amiodarone 200 mg bid afterwards. Will arrange with Dr Foreman. Follow up in 6 weeks.

## 2018-03-22 NOTE — ANESTHESIA RELEASE NOTE
"Anesthesia Release from PACU Note    Patient: Ute Mcbride    Procedure(s) Performed: Procedure(s) (LRB):  TRANSESOPHAGEAL ECHOCARDIOGRAM (PEGGY) (N/A)    Anesthesia type: MAC    Post pain: Adequate analgesia    Post assessment: no apparent anesthetic complications, tolerated procedure well and no evidence of recall    Last Vitals:   Visit Vitals  BP (!) 139/98   Pulse (!) 123   Temp 36.6 °C (97.9 °F) (Oral)   Resp 19   Ht 5' 7" (1.702 m)   Wt 79.8 kg (176 lb)   SpO2 98%   BMI 27.57 kg/m²       Post vital signs: stable    Level of consciousness: awake    Nausea/Vomiting: no nausea/no vomiting    Complications: none    Airway Patency: patent    Respiratory: unassisted, spontaneous ventilation    Cardiovascular: stable and blood pressure at baseline    Hydration: euvolemic  "

## 2018-03-22 NOTE — INTERVAL H&P NOTE
The patient has been examined and the H&P has been reviewed:    I concur with the findings and no changes have occurred since H&P was written.    Anesthesia/Surgery risks, benefits and alternative options discussed and understood by patient/family.    For PEGGY/DCCV and Amio      Active Hospital Problems    Diagnosis  POA    Atrial fibrillation, permanent [I48.2]  Yes      Resolved Hospital Problems    Diagnosis Date Resolved POA   No resolved problems to display.

## 2018-03-27 ENCOUNTER — ANTI-COAG VISIT (OUTPATIENT)
Dept: CARDIOLOGY | Facility: CLINIC | Age: 63
End: 2018-03-27
Payer: MEDICARE

## 2018-03-27 DIAGNOSIS — Z79.01 LONG TERM (CURRENT) USE OF ANTICOAGULANTS: Primary | ICD-10-CM

## 2018-03-27 LAB — INR PPP: 2.8 (ref 2–3)

## 2018-03-27 PROCEDURE — 85610 PROTHROMBIN TIME: CPT | Mod: PBBFAC

## 2018-03-27 NOTE — PROGRESS NOTES
Patient's INR is therapeutic today at 2.8.  Will continue same dosage of Warfarin.  Weekly checks for impending DCCV.  Follow up in Coumadin Clinic on Tuesday, 4/03/2018.

## 2018-04-05 ENCOUNTER — ANTI-COAG VISIT (OUTPATIENT)
Dept: CARDIOLOGY | Facility: CLINIC | Age: 63
End: 2018-04-05
Payer: MEDICARE

## 2018-04-05 DIAGNOSIS — Z79.01 LONG TERM (CURRENT) USE OF ANTICOAGULANTS: Primary | ICD-10-CM

## 2018-04-05 LAB — INR PPP: 4.5 (ref 2–3)

## 2018-04-05 PROCEDURE — 85610 PROTHROMBIN TIME: CPT | Mod: PBBFAC

## 2018-04-05 PROCEDURE — 99211 OFF/OP EST MAY X REQ PHY/QHP: CPT | Mod: PBBFAC

## 2018-04-05 PROCEDURE — 99999 PR PBB SHADOW E&M-EST. PATIENT-LVL I: CPT | Mod: PBBFAC,,,

## 2018-04-05 NOTE — PROGRESS NOTES
Patient's INR is supra therapeutic today at 4.5.  Reports no active bleeding at present.  Instructed patient to hold dose tonight and start new dosage of 2 mg Monday through Friday and 1 mg on Saturday and Sunday.  Recheck in 1 week.

## 2018-04-09 ENCOUNTER — OFFICE VISIT (OUTPATIENT)
Dept: CARDIOLOGY | Facility: CLINIC | Age: 63
End: 2018-04-09
Payer: MEDICARE

## 2018-04-09 ENCOUNTER — TELEPHONE (OUTPATIENT)
Dept: CARDIOLOGY | Facility: CLINIC | Age: 63
End: 2018-04-09

## 2018-04-09 VITALS
HEART RATE: 84 BPM | DIASTOLIC BLOOD PRESSURE: 80 MMHG | BODY MASS INDEX: 26.76 KG/M2 | WEIGHT: 170.88 LBS | SYSTOLIC BLOOD PRESSURE: 144 MMHG

## 2018-04-09 DIAGNOSIS — I50.23 ACUTE ON CHRONIC SYSTOLIC HF (HEART FAILURE): ICD-10-CM

## 2018-04-09 DIAGNOSIS — I51.3 THROMBUS OF LEFT ATRIAL APPENDAGE WITHOUT ANTECEDENT MYOCARDIAL INFARCTION: ICD-10-CM

## 2018-04-09 DIAGNOSIS — I48.91 ATRIAL FIBRILLATION WITH RVR: Primary | ICD-10-CM

## 2018-04-09 DIAGNOSIS — Z79.01 CHRONIC ANTICOAGULATION: Chronic | ICD-10-CM

## 2018-04-09 DIAGNOSIS — I42.8 CARDIOMYOPATHY, NONISCHEMIC: ICD-10-CM

## 2018-04-09 DIAGNOSIS — I11.0 HYPERTENSIVE CHF (CONGESTIVE HEART FAILURE): Chronic | ICD-10-CM

## 2018-04-09 PROCEDURE — 99213 OFFICE O/P EST LOW 20 MIN: CPT | Mod: PBBFAC | Performed by: INTERNAL MEDICINE

## 2018-04-09 PROCEDURE — 99214 OFFICE O/P EST MOD 30 MIN: CPT | Mod: S$PBB,,, | Performed by: INTERNAL MEDICINE

## 2018-04-09 PROCEDURE — 99999 PR PBB SHADOW E&M-EST. PATIENT-LVL III: CPT | Mod: PBBFAC,,, | Performed by: INTERNAL MEDICINE

## 2018-04-09 RX ORDER — POTASSIUM CHLORIDE 20 MEQ/1
20 TABLET, EXTENDED RELEASE ORAL DAILY
COMMUNITY
End: 2019-05-16

## 2018-04-09 NOTE — TELEPHONE ENCOUNTER
"Patient returned phone call to schedule/confirm Sae/Cv with Dr. Peck on 4/26/18 for 2:00PM  Advised to arrive at 12:30 PM   Reviewed NPO except for usual morning medications but hold "fluid pill and potassium, along with Diabetic medications.  Answered all questions.          ----- Message from Chano Foreman MD sent at 4/9/2018 10:50 AM CDT -----  Regarding: SAE/DCCV  Hi can you schedule this pt a SAE/DCCV for week of April 23rd - prefer Tues-Thurs sometime in afternoon-2 pm. Thanks    "

## 2018-04-09 NOTE — TELEPHONE ENCOUNTER
----- Message from Chano Foreman MD sent at 4/9/2018 10:50 AM CDT -----  Regarding: PEGGY/DCCV  Hi can you schedule this pt a PEGGY/DCCV for week of April 23rd - prefer Tues-Thurs sometime in afternoon-2 pm. Thanks

## 2018-04-09 NOTE — PROGRESS NOTES
Subjective:   Patient ID:  Ute Mcbride is a 62 y.o. male who presents for cardiac consult of Atrial Fibrillation (f/u)      HPI  The patient came in today for cardiac consult of Atrial Fibrillation (f/u)    This is a 62 year old male pt with PMHx NICM EF 25%, Chronic AF on coumadin, HTN, alc abuse in past presents for follow up.     2/14/18  Pt said he was doing ok day prior to ED visit but after drinking some beer he felt more tired and started having more palpitations. He had been seen a few years ago by cardiology and had been doing well. He is compliant with meds but is going to run out soon and came to ED for evaluation. He was given IV cardizem as he was in AF RVR and felt fine after HR improved.   ECG - AF RVR, trop negative, . Upon my exam in ED pt felt fine, HR and BP well controlled.  He had 2D ECHO which revealed LVEF 20% as in past with PH with PASP 62 mmHg along with moderate/severe RV dysfunction.   Was discharged from ED to follow up in clinic.     4/9/18  Pt presented to [facility] [place] PEGGY/DCCV on 3/22/18, pt had positive VERONICA thrombus with subtherapeutic INR prior to study. DCCV was not attempted and discharged to follow up at coumadin clinic. Recent INR supratherapeutic at 4.5. No bleeding issues. Pt walks every day in the morning about a mile, mild SOB but overall ok.     Patient feels well, no specific complaints, no chest pain, no sob, no leg swelling, no PND, no palpitation, no dizziness, no syncope, no CNS symptoms.    Patient is compliant with medications.    2D ECHO 2/14/18  CONCLUSIONS     1 - Severely depressed left ventricular systolic function (EF 20-25%).     2 - Impaired LV relaxation, increased LVEDP.     3 - Severe left atrial enlargement.     4 - Eccentric hypertrophy.     5 - Right ventricular enlargement with moderately to severely depressed systolic function.     6 - Pulmonary hypertension. The estimated PA systolic pressure is greater than 62 mmHg.     7 - Mild mitral  regurgitation.     8 - Moderate tricuspid regurgitation.     9 - Mild pulmonic regurgitation.       Past Medical History:   Diagnosis Date    Anticoagulant long-term use     but has been noncompliant    Arthritis     Atrial fibrillation, chronic     Cardiomyopathy, nonischemic 11/9/2014    CHF (congestive heart failure)     Coronary artery disease     Gout     Hypertension        Past Surgical History:   Procedure Laterality Date    ANKLE SURGERY      CATARACT EXTRACTION W/  INTRAOCULAR LENS IMPLANT Bilateral        Social History   Substance Use Topics    Smoking status: Never Smoker    Smokeless tobacco: Never Used    Alcohol use 1.2 oz/week     2 Cans of beer per week       History reviewed. No pertinent family history.    Patient's Medications   New Prescriptions    No medications on file   Previous Medications    CARVEDILOL (COREG) 6.25 MG TABLET    Take 1 tablet (6.25 mg total) by mouth 2 (two) times daily with meals.    DIGOXIN (LANOXIN) 250 MCG TABLET    Take 1 tablet (250 mcg total) by mouth once daily.    FUROSEMIDE (LASIX) 40 MG TABLET    Take 1 tablet (40 mg total) by mouth 2 (two) times daily.    LISINOPRIL 10 MG TABLET    Take 1 tablet (10 mg total) by mouth once daily.    PANTOPRAZOLE (PROTONIX) 40 MG TABLET    Take 1 tablet (40 mg total) by mouth once daily.    POTASSIUM CHLORIDE SA (K-DUR,KLOR-CON, K-TAB) 20 MEQ TABLET    Take 20 mEq by mouth once daily. Takes two tablets by mouth once a day    WARFARIN (COUMADIN) 2 MG TABLET    Take 1/2 tablet (1mg) on Saturday and Sunday and 1 tablet (2mg) all other days as directed by coumadin clinic. Discontinue 5mg tablet.   Modified Medications    No medications on file   Discontinued Medications    POTASSIUM CHLORIDE (KLOR-CON) 10 MEQ TBSR    Take 2 tablets (20 mEq total) by mouth once daily.       Review of Systems   Constitutional: Negative.    HENT: Negative.    Eyes: Negative.    Respiratory: Negative.    Cardiovascular: Negative.     Gastrointestinal: Negative.    Genitourinary: Negative.    Musculoskeletal: Negative.    Skin: Negative.    Neurological: Negative.    Endo/Heme/Allergies: Negative.    Psychiatric/Behavioral: Negative.    All 12 systems otherwise negative.      Wt Readings from Last 3 Encounters:   04/09/18 77.5 kg (170 lb 13.7 oz)   03/22/18 79.8 kg (176 lb)   03/08/18 79.9 kg (176 lb 2.4 oz)     Temp Readings from Last 3 Encounters:   03/22/18 97.9 °F (36.6 °C) (Oral)   02/21/18 98.1 °F (36.7 °C) (Oral)   02/14/18 98 °F (36.7 °C) (Oral)     BP Readings from Last 3 Encounters:   04/09/18 (!) 144/80   03/22/18 (!) 139/98   03/08/18 132/88     Pulse Readings from Last 3 Encounters:   04/09/18 84   03/22/18 (!) 123   03/08/18 78       BP (!) 144/80 (BP Method: Small (Manual))   Pulse 84   Wt 77.5 kg (170 lb 13.7 oz)   BMI 26.76 kg/m²     Objective:   Physical Exam   Constitutional: He is oriented to person, place, and time. He appears well-developed and well-nourished. No distress.   HENT:   Head: Normocephalic and atraumatic.   Nose: Nose normal.   Mouth/Throat: Oropharynx is clear and moist.   Eyes: Conjunctivae and EOM are normal. No scleral icterus.   Neck: Normal range of motion. Neck supple. No JVD present. No thyromegaly present.   Cardiovascular: Normal rate, regular rhythm, S1 normal and S2 normal.  Exam reveals no gallop, no S3, no S4 and no friction rub.    No murmur heard.  Pulmonary/Chest: Effort normal and breath sounds normal. No stridor. No respiratory distress. He has no wheezes. He has no rales. He exhibits no tenderness.   Abdominal: Soft. Bowel sounds are normal. He exhibits no distension and no mass. There is no tenderness. There is no rebound.   Genitourinary:   Genitourinary Comments: Deferred   Musculoskeletal: Normal range of motion. He exhibits no edema, tenderness or deformity.   Lymphadenopathy:     He has no cervical adenopathy.   Neurological: He is alert and oriented to person, place, and time. He  exhibits normal muscle tone. Coordination normal.   Skin: Skin is warm and dry. No rash noted. He is not diaphoretic. No erythema. No pallor.   Psychiatric: He has a normal mood and affect. His behavior is normal. Judgment and thought content normal.   Nursing note and vitals reviewed.      Lab Results   Component Value Date     02/20/2018    K 3.7 02/20/2018     02/20/2018    CO2 24 02/20/2018    BUN 10 02/20/2018    CREATININE 0.8 02/20/2018     (H) 02/20/2018    HGBA1C 6.4 (H) 11/07/2014    MG 2.1 11/10/2014    AST 23 02/20/2018    ALT 19 02/20/2018    ALBUMIN 3.5 02/20/2018    PROT 7.6 02/20/2018    BILITOT 1.6 (H) 02/20/2018    WBC 5.78 02/20/2018    HGB 16.1 02/20/2018    HCT 48.3 02/20/2018    MCV 93 02/20/2018     (L) 02/20/2018    INR 4.5 (A) 04/05/2018    INR 1.9 (H) 03/08/2018    TSH 4.36 (H) 01/13/2010     (H) 02/14/2018     Assessment:      1. Atrial fibrillation with RVR    2. Hypertensive CHF (congestive heart failure)    3. Acute on chronic systolic HF (heart failure)    4. Cardiomyopathy, nonischemic    5. Chronic anticoagulation    6. Thrombus of left atrial appendage without antecedent myocardial infarction        Plan:   1. NICM EF 20-25% - pt euvolemic   - cont Coreg and lisinopril  - continue lasix and K supplement  - will refer for ICD to EP    2. AF - will attempt PEGGY/DCCV in 3 weeks  - cont Coumadin and Coreg and Digoxin  - rate controlled  - needs to avoid alc  - needs coumadin clinic for INR    3. HTN - BP mildly elevated  - cont meds, low salt diet     Thank you for allowing me to participate in this patient's care. Please do not hesitate to contact me with any questions or concerns. Consult note has been forwarded to the referral physician.

## 2018-04-12 ENCOUNTER — ANTI-COAG VISIT (OUTPATIENT)
Dept: CARDIOLOGY | Facility: CLINIC | Age: 63
End: 2018-04-12
Payer: MEDICARE

## 2018-04-12 DIAGNOSIS — Z79.01 LONG TERM (CURRENT) USE OF ANTICOAGULANTS: Primary | ICD-10-CM

## 2018-04-12 LAB — INR PPP: 3.9 (ref 2–3)

## 2018-04-12 PROCEDURE — 99211 OFF/OP EST MAY X REQ PHY/QHP: CPT | Mod: PBBFAC

## 2018-04-12 PROCEDURE — 99999 PR PBB SHADOW E&M-EST. PATIENT-LVL I: CPT | Mod: PBBFAC,,,

## 2018-04-12 PROCEDURE — 85610 PROTHROMBIN TIME: CPT | Mod: PBBFAC

## 2018-04-12 NOTE — PROGRESS NOTES
Patient's INR remains elevated - 3.9.  Reports no active bleeding at present.  Instructed to hold Warfarin today, then start new dosage of  1 mg on Mon, Wed and Fri, and 2 mg on Tues, Thurs, Sat and Sun.  Recheck in 1 week.

## 2018-04-19 ENCOUNTER — ANTI-COAG VISIT (OUTPATIENT)
Dept: CARDIOLOGY | Facility: CLINIC | Age: 63
End: 2018-04-19
Payer: MEDICARE

## 2018-04-19 DIAGNOSIS — Z79.01 LONG TERM (CURRENT) USE OF ANTICOAGULANTS: Primary | ICD-10-CM

## 2018-04-19 LAB — INR PPP: 2.5 (ref 2–3)

## 2018-04-19 PROCEDURE — 85610 PROTHROMBIN TIME: CPT | Mod: PBBFAC

## 2018-04-19 NOTE — PROGRESS NOTES
Patient's INR is therapeutic at 2.5.   No changes in dose.  Recheck on 5/03/2018.  Please call should you have any questions or concerns at [phone] or [phone].

## 2018-04-25 ENCOUNTER — TELEPHONE (OUTPATIENT)
Dept: CARDIOLOGY | Facility: CLINIC | Age: 63
End: 2018-04-25

## 2018-04-25 NOTE — TELEPHONE ENCOUNTER
----- Message from Thalia Fritz sent at 4/25/2018 11:32 AM CDT -----  Contact: pt  States he is schedule for two test tomorrow and he needs to know what time to come in. Please call pt at [phone]. Thank you

## 2018-04-26 ENCOUNTER — ANESTHESIA EVENT (OUTPATIENT)
Dept: CARDIOLOGY | Facility: HOSPITAL | Age: 63
End: 2018-04-26
Payer: MEDICARE

## 2018-04-26 ENCOUNTER — HOSPITAL ENCOUNTER (OUTPATIENT)
Facility: HOSPITAL | Age: 63
Discharge: HOME | End: 2018-04-26
Attending: INTERNAL MEDICINE | Admitting: INTERNAL MEDICINE
Payer: MEDICARE

## 2018-04-26 ENCOUNTER — SURGERY (OUTPATIENT)
Age: 63
End: 2018-04-26

## 2018-04-26 ENCOUNTER — ANESTHESIA (OUTPATIENT)
Dept: CARDIOLOGY | Facility: HOSPITAL | Age: 63
End: 2018-04-26
Payer: MEDICARE

## 2018-04-26 VITALS
HEIGHT: 67 IN | OXYGEN SATURATION: 94 % | BODY MASS INDEX: 26.68 KG/M2 | RESPIRATION RATE: 19 BRPM | WEIGHT: 170 LBS | SYSTOLIC BLOOD PRESSURE: 155 MMHG | TEMPERATURE: 98 F | DIASTOLIC BLOOD PRESSURE: 102 MMHG | HEART RATE: 108 BPM

## 2018-04-26 DIAGNOSIS — I48.91 ATRIAL FIBRILLATION: ICD-10-CM

## 2018-04-26 DIAGNOSIS — I48.91 AF (ATRIAL FIBRILLATION): ICD-10-CM

## 2018-04-26 DIAGNOSIS — I48.91 ATRIAL FIBRILLATION STATUS POST CARDIOVERSION: ICD-10-CM

## 2018-04-26 LAB
AORTIC ATHEROMA: YES
INR PPP: 4.1
MITRAL VALVE MOBILITY: NORMAL
MITRAL VALVE REGURGITATION: ABNORMAL
PROTHROMBIN TIME: 42.2 SEC
RETIRED EF AND QEF - SEE NOTES: 20 (ref 55–65)
TRICUSPID VALVE REGURGITATION: ABNORMAL

## 2018-04-26 PROCEDURE — 93325 DOPPLER ECHO COLOR FLOW MAPG: CPT | Mod: 26,,, | Performed by: INTERNAL MEDICINE

## 2018-04-26 PROCEDURE — 93320 DOPPLER ECHO COMPLETE: CPT | Performed by: INTERNAL MEDICINE

## 2018-04-26 PROCEDURE — 85610 PROTHROMBIN TIME: CPT

## 2018-04-26 PROCEDURE — 93312 ECHO TRANSESOPHAGEAL: CPT | Performed by: INTERNAL MEDICINE

## 2018-04-26 PROCEDURE — 93320 DOPPLER ECHO COMPLETE: CPT | Mod: 26,,, | Performed by: INTERNAL MEDICINE

## 2018-04-26 PROCEDURE — 93325 DOPPLER ECHO COLOR FLOW MAPG: CPT | Performed by: INTERNAL MEDICINE

## 2018-04-26 PROCEDURE — 92960 CARDIOVERSION ELECTRIC EXT: CPT | Mod: ,,, | Performed by: INTERNAL MEDICINE

## 2018-04-26 PROCEDURE — 93005 ELECTROCARDIOGRAM TRACING: CPT

## 2018-04-26 PROCEDURE — 93010 ELECTROCARDIOGRAM REPORT: CPT | Mod: 59,76,, | Performed by: INTERNAL MEDICINE

## 2018-04-26 PROCEDURE — 25000003 PHARM REV CODE 250

## 2018-04-26 PROCEDURE — 25000003 PHARM REV CODE 250: Performed by: NURSE ANESTHETIST, CERTIFIED REGISTERED

## 2018-04-26 PROCEDURE — 92960 CARDIOVERSION ELECTRIC EXT: CPT

## 2018-04-26 PROCEDURE — 37000008 HC ANESTHESIA 1ST 15 MINUTES: Performed by: INTERNAL MEDICINE

## 2018-04-26 PROCEDURE — 25000003 PHARM REV CODE 250: Performed by: INTERNAL MEDICINE

## 2018-04-26 PROCEDURE — 93312 ECHO TRANSESOPHAGEAL: CPT | Mod: 26,,, | Performed by: INTERNAL MEDICINE

## 2018-04-26 RX ORDER — LIDOCAINE HYDROCHLORIDE 10 MG/ML
INJECTION INFILTRATION; PERINEURAL
Status: DISCONTINUED | OUTPATIENT
Start: 2018-04-26 | End: 2018-04-26

## 2018-04-26 RX ORDER — SODIUM CHLORIDE, SODIUM LACTATE, POTASSIUM CHLORIDE, CALCIUM CHLORIDE 600; 310; 30; 20 MG/100ML; MG/100ML; MG/100ML; MG/100ML
INJECTION, SOLUTION INTRAVENOUS CONTINUOUS PRN
Status: DISCONTINUED | OUTPATIENT
Start: 2018-04-26 | End: 2018-04-26

## 2018-04-26 RX ORDER — AMIODARONE HYDROCHLORIDE 200 MG/1
200 TABLET ORAL 2 TIMES DAILY
Qty: 120 TABLET | Refills: 3 | Status: SHIPPED | OUTPATIENT
Start: 2018-04-26 | End: 2019-04-02 | Stop reason: SDUPTHER

## 2018-04-26 RX ORDER — ETOMIDATE 2 MG/ML
INJECTION INTRAVENOUS
Status: DISCONTINUED | OUTPATIENT
Start: 2018-04-26 | End: 2018-04-26

## 2018-04-26 RX ORDER — AMIODARONE HYDROCHLORIDE 200 MG/1
400 TABLET ORAL ONCE
Status: COMPLETED | OUTPATIENT
Start: 2018-04-26 | End: 2018-04-26

## 2018-04-26 RX ADMIN — ETOMIDATE 5 MG: 2 INJECTION, SOLUTION INTRAVENOUS at 01:04

## 2018-04-26 RX ADMIN — SODIUM CHLORIDE, SODIUM LACTATE, POTASSIUM CHLORIDE, AND CALCIUM CHLORIDE: 600; 310; 30; 20 INJECTION, SOLUTION INTRAVENOUS at 01:04

## 2018-04-26 RX ADMIN — LIDOCAINE HYDROCHLORIDE 50 MG: 10 INJECTION, SOLUTION INFILTRATION; PERINEURAL at 01:04

## 2018-04-26 RX ADMIN — AMIODARONE HYDROCHLORIDE 400 MG: 200 TABLET ORAL at 02:04

## 2018-04-26 NOTE — INTERVAL H&P NOTE
The patient has been examined and the H&P has been reviewed:    I concur with the findings and no changes have occurred since H&P was written.    Anesthesia/Surgery risks, benefits and alternative options discussed and understood by patient/family.    PEGGY/DCCV today if no VERONICA thrombus.       Active Hospital Problems    Diagnosis  POA    Atrial fibrillation [I48.91]  Yes     Priority: High      Resolved Hospital Problems    Diagnosis Date Resolved POA   No resolved problems to display.

## 2018-04-26 NOTE — H&P (VIEW-ONLY)
Subjective:   Patient ID:  Ute Mcbride is a 62 y.o. male who presents for cardiac consult of Atrial Fibrillation (f/u)      HPI  The patient came in today for cardiac consult of Atrial Fibrillation (f/u)    This is a 62 year old male pt with PMHx NICM EF 25%, Chronic AF on coumadin, HTN, alc abuse in past presents for follow up.     2/14/18  Pt said he was doing ok day prior to ED visit but after drinking some beer he felt more tired and started having more palpitations. He had been seen a few years ago by cardiology and had been doing well. He is compliant with meds but is going to run out soon and came to ED for evaluation. He was given IV cardizem as he was in AF RVR and felt fine after HR improved.   ECG - AF RVR, trop negative, . Upon my exam in ED pt felt fine, HR and BP well controlled.  He had 2D ECHO which revealed LVEF 20% as in past with PH with PASP 62 mmHg along with moderate/severe RV dysfunction.   Was discharged from ED to follow up in clinic.     4/9/18  Pt presented to [facility] [place] for PEGGY/DCCV on 3/22/18, pt had positive VERONICA thrombus with subtherapeutic INR prior to study. DCCV was not attempted and discharged to follow up at coumadin clinic. Recent INR supratherapeutic at 4.5. No bleeding issues. Pt walks every day in the morning about a mile, mild SOB but overall ok.     Patient feels well, no specific complaints, no chest pain, no sob, no leg swelling, no PND, no palpitation, no dizziness, no syncope, no CNS symptoms.    Patient is compliant with medications.    2D ECHO 2/14/18  CONCLUSIONS     1 - Severely depressed left ventricular systolic function (EF 20-25%).     2 - Impaired LV relaxation, increased LVEDP.     3 - Severe left atrial enlargement.     4 - Eccentric hypertrophy.     5 - Right ventricular enlargement with moderately to severely depressed systolic function.     6 - Pulmonary hypertension. The estimated PA systolic pressure is greater than 62 mmHg.     7 - Mild mitral  regurgitation.     8 - Moderate tricuspid regurgitation.     9 - Mild pulmonic regurgitation.       Past Medical History:   Diagnosis Date    Anticoagulant long-term use     but has been noncompliant    Arthritis     Atrial fibrillation, chronic     Cardiomyopathy, nonischemic 11/9/2014    CHF (congestive heart failure)     Coronary artery disease     Gout     Hypertension        Past Surgical History:   Procedure Laterality Date    ANKLE SURGERY      CATARACT EXTRACTION W/  INTRAOCULAR LENS IMPLANT Bilateral        Social History   Substance Use Topics    Smoking status: Never Smoker    Smokeless tobacco: Never Used    Alcohol use 1.2 oz/week     2 Cans of beer per week       History reviewed. No pertinent family history.    Patient's Medications   New Prescriptions    No medications on file   Previous Medications    CARVEDILOL (COREG) 6.25 MG TABLET    Take 1 tablet (6.25 mg total) by mouth 2 (two) times daily with meals.    DIGOXIN (LANOXIN) 250 MCG TABLET    Take 1 tablet (250 mcg total) by mouth once daily.    FUROSEMIDE (LASIX) 40 MG TABLET    Take 1 tablet (40 mg total) by mouth 2 (two) times daily.    LISINOPRIL 10 MG TABLET    Take 1 tablet (10 mg total) by mouth once daily.    PANTOPRAZOLE (PROTONIX) 40 MG TABLET    Take 1 tablet (40 mg total) by mouth once daily.    POTASSIUM CHLORIDE SA (K-DUR,KLOR-CON, K-TAB) 20 MEQ TABLET    Take 20 mEq by mouth once daily. Takes two tablets by mouth once a day    WARFARIN (COUMADIN) 2 MG TABLET    Take 1/2 tablet (1mg) on Saturday and Sunday and 1 tablet (2mg) all other days as directed by coumadin clinic. Discontinue 5mg tablet.   Modified Medications    No medications on file   Discontinued Medications    POTASSIUM CHLORIDE (KLOR-CON) 10 MEQ TBSR    Take 2 tablets (20 mEq total) by mouth once daily.       Review of Systems   Constitutional: Negative.    HENT: Negative.    Eyes: Negative.    Respiratory: Negative.    Cardiovascular: Negative.     Gastrointestinal: Negative.    Genitourinary: Negative.    Musculoskeletal: Negative.    Skin: Negative.    Neurological: Negative.    Endo/Heme/Allergies: Negative.    Psychiatric/Behavioral: Negative.    All 12 systems otherwise negative.      Wt Readings from Last 3 Encounters:   04/09/18 77.5 kg (170 lb 13.7 oz)   03/22/18 79.8 kg (176 lb)   03/08/18 79.9 kg (176 lb 2.4 oz)     Temp Readings from Last 3 Encounters:   03/22/18 97.9 °F (36.6 °C) (Oral)   02/21/18 98.1 °F (36.7 °C) (Oral)   02/14/18 98 °F (36.7 °C) (Oral)     BP Readings from Last 3 Encounters:   04/09/18 (!) 144/80   03/22/18 (!) 139/98   03/08/18 132/88     Pulse Readings from Last 3 Encounters:   04/09/18 84   03/22/18 (!) 123   03/08/18 78       BP (!) 144/80 (BP Method: Small (Manual))   Pulse 84   Wt 77.5 kg (170 lb 13.7 oz)   BMI 26.76 kg/m²     Objective:   Physical Exam   Constitutional: He is oriented to person, place, and time. He appears well-developed and well-nourished. No distress.   HENT:   Head: Normocephalic and atraumatic.   Nose: Nose normal.   Mouth/Throat: Oropharynx is clear and moist.   Eyes: Conjunctivae and EOM are normal. No scleral icterus.   Neck: Normal range of motion. Neck supple. No JVD present. No thyromegaly present.   Cardiovascular: Normal rate, regular rhythm, S1 normal and S2 normal.  Exam reveals no gallop, no S3, no S4 and no friction rub.    No murmur heard.  Pulmonary/Chest: Effort normal and breath sounds normal. No stridor. No respiratory distress. He has no wheezes. He has no rales. He exhibits no tenderness.   Abdominal: Soft. Bowel sounds are normal. He exhibits no distension and no mass. There is no tenderness. There is no rebound.   Genitourinary:   Genitourinary Comments: Deferred   Musculoskeletal: Normal range of motion. He exhibits no edema, tenderness or deformity.   Lymphadenopathy:     He has no cervical adenopathy.   Neurological: He is alert and oriented to person, place, and time. He  exhibits normal muscle tone. Coordination normal.   Skin: Skin is warm and dry. No rash noted. He is not diaphoretic. No erythema. No pallor.   Psychiatric: He has a normal mood and affect. His behavior is normal. Judgment and thought content normal.   Nursing note and vitals reviewed.      Lab Results   Component Value Date     02/20/2018    K 3.7 02/20/2018     02/20/2018    CO2 24 02/20/2018    BUN 10 02/20/2018    CREATININE 0.8 02/20/2018     (H) 02/20/2018    HGBA1C 6.4 (H) 11/07/2014    MG 2.1 11/10/2014    AST 23 02/20/2018    ALT 19 02/20/2018    ALBUMIN 3.5 02/20/2018    PROT 7.6 02/20/2018    BILITOT 1.6 (H) 02/20/2018    WBC 5.78 02/20/2018    HGB 16.1 02/20/2018    HCT 48.3 02/20/2018    MCV 93 02/20/2018     (L) 02/20/2018    INR 4.5 (A) 04/05/2018    INR 1.9 (H) 03/08/2018    TSH 4.36 (H) 01/13/2010     (H) 02/14/2018     Assessment:      1. Atrial fibrillation with RVR    2. Hypertensive CHF (congestive heart failure)    3. Acute on chronic systolic HF (heart failure)    4. Cardiomyopathy, nonischemic    5. Chronic anticoagulation    6. Thrombus of left atrial appendage without antecedent myocardial infarction        Plan:   1. NICM EF 20-25% - pt euvolemic   - cont Coreg and lisinopril  - continue lasix and K supplement  - will refer for ICD to EP    2. AF - will attempt PEGGY/DCCV in 3 weeks  - cont Coumadin and Coreg and Digoxin  - rate controlled  - needs to avoid alc  - needs coumadin clinic for INR    3. HTN - BP mildly elevated  - cont meds, low salt diet     Thank you for allowing me to participate in this patient's care. Please do not hesitate to contact me with any questions or concerns. Consult note has been forwarded to the referral physician.

## 2018-04-26 NOTE — ANESTHESIA PREPROCEDURE EVALUATION
04/26/2018  Ute Mcbride is a 62 y.o., male.    Anesthesia Evaluation    I have reviewed the Patient Summary Reports.    I have reviewed the Nursing Notes.   I have reviewed the Medications.     Review of Systems  Anesthesia Hx:  No problems with previous Anesthesia  History of prior surgery of interest to airway management or planning: Denies Family Hx of Anesthesia complications.   Denies Personal Hx of Anesthesia complications.   Social:  Former Smoker    Hematology/Oncology:         -- Anemia:   Cardiovascular:   Hypertension CAD  Dysrhythmias atrial fibrillation CHF ECG has been reviewed. Echo 03/18  CONCLUSIONS     1 - Left atrial appendage is single lobed with spontaneous echo contrast with adherent thrombus.     2 - Severely depressed left ventricular systolic function (EF 20-25%).     3 - Indeterminate LV diastolic function.     4 - Shunt across atrial septum consistent with small PFO.     5 - Biatrial enlargement.     6 - Mild mitral regurgitation.     7 - Moderate tricuspid regurgitation.     8 - Mild pulmonic regurgitation.        Hepatic/GI:  Hepatic/GI Normal    Musculoskeletal:   Arthritis  gout   Endocrine:  Endocrine Normal    Psych:   Psychiatric History          Physical Exam  General:  Well nourished    Airway/Jaw/Neck:  Airway Findings: Mouth Opening: Normal Tongue: Normal  General Airway Assessment: Adult  Mallampati: II  TM Distance: Normal, at least 6 cm          Chest/Lungs:  Chest/Lungs Findings: Normal Respiratory Rate     Heart/Vascular:  Heart Findings: Rhythm: Irregularly Irregular             Anesthesia Plan  Type of Anesthesia, risks & benefits discussed:  Anesthesia Type:  MAC  Patient's Preference:   Intra-op Monitoring Plan: standard ASA monitors  Intra-op Monitoring Plan Comments:   Post Op Pain Control Plan:   Post Op Pain Control Plan Comments:   Induction:    IV  Beta Blocker:  Patient is on a Beta-Blocker and has received one dose within the past 24 hours (No further documentation required).       Informed Consent: Patient understands risks and agrees with Anesthesia plan.  Questions answered. Anesthesia consent signed with patient.  ASA Score: 3     Day of Surgery Review of History & Physical:    H&P update referred to the surgeon.         Ready For Surgery From Anesthesia Perspective.

## 2018-04-26 NOTE — ANESTHESIA POSTPROCEDURE EVALUATION
"Anesthesia Post Evaluation    Patient: Ute Mcbride    Procedure(s) Performed: Procedure(s) (LRB):  TRANSESOPHAGEAL ECHOCARDIOGRAM (PEGGY) (N/A)    Final Anesthesia Type: MAC  Patient location during evaluation: PACU  Patient participation: Yes- Able to Participate  Level of consciousness: awake and alert  Post-procedure vital signs: reviewed and stable  Pain management: adequate  Airway patency: patent  PONV status at discharge: No PONV  Anesthetic complications: no      Cardiovascular status: blood pressure returned to baseline  Respiratory status: unassisted  Hydration status: euvolemic  Follow-up not needed.        Visit Vitals  BP (!) 155/102   Pulse 108   Temp 36.6 °C (97.8 °F) (Oral)   Resp 19   Ht 5' 7" (1.702 m)   Wt 77.1 kg (170 lb)   SpO2 (!) 94%   BMI 26.63 kg/m²       Pain/Rosario Score: No Data Recorded      "

## 2018-04-26 NOTE — ANESTHESIA RELEASE NOTE
"Anesthesia Release from PACU Note    Patient: Ute Mcbride    Procedure(s) Performed: Procedure(s) (LRB):  TRANSESOPHAGEAL ECHOCARDIOGRAM (PEGGY) (N/A)    Anesthesia type: MAC    Post pain: Adequate analgesia    Post assessment: no apparent anesthetic complications    Last Vitals:   Visit Vitals  BP (!) 155/102   Pulse 108   Temp 36.6 °C (97.8 °F) (Oral)   Resp 19   Ht 5' 7" (1.702 m)   Wt 77.1 kg (170 lb)   SpO2 (!) 94%   BMI 26.63 kg/m²       Post vital signs: stable    Level of consciousness: awake    Nausea/Vomiting: no nausea/no vomiting    Complications: none    Airway Patency: patent    Respiratory: unassisted    Cardiovascular: stable and blood pressure at baseline    Hydration: euvolemic  "

## 2018-04-26 NOTE — BRIEF OP NOTE
[facility] -   Discharge Summary     Patient ID:  Ute Mcbride  [id number]  62 y.o.  1955    Admit date: 4/26/2018    Discharge Date and Time:  04/26/2018 2:08 PM    Admitting Physician: Chano Foreman MD     Discharge Provider: Chano Foreman Md    Reason for Admission: PAF (paroxysmal atrial fibrillation) [I48.0]  PAF (paroxysmal atrial fibrillation) [I48.0]  Atrial fibrillation [I48.91]    Admission Condition: good    Procedures Performed: Procedure(s) (LRB):  TRANSESOPHAGEAL ECHOCARDIOGRAM (PEGGY) (N/A)    Hospital Course (synopsis of major diagnoses, care, treatment, and services provided during the course of the hospital stay):     PEGGY/DCCV    Surgeon/Physician: Chano Foreman MD     Pre Op Diagnosis: Afib r/o thrombus    Post OP Diagnosis: No VERONICA thrombus    Procedure Performed: Transesophageal echocardiogram     Procedure Description: The risks, benefits, and alternatives of the procedure expalined in detail with patient and family. The patient voices understanding and all questions have been addressed. The patient agrees to proceed as planned.   The patient was sedated by anesthesiologist. The probe was advanced via throat into esophagus smoothly. The patient tolerated the procedure well and there was no complications. The probed was withdrawal at the end of study. The patient was hemodynamically stable.     Estimated Blood Loss: none.     Findings / Operative Note:   No VERONICA thrombus noted.    Successful DCCV to NSR with 200J x 1.     Ok to discharge to home once hemodynamically stable.    Start Amiodarone    F/U with me in 4 week at cardiology clinic.    F/U with Dr. Lou in 6 weeks.       Consults: None    Significant Diagnostic Studies: labs: INR - 4    Final Diagnoses:    Principal Problem: Afib   Secondary Diagnoses:   Active Hospital Problems    Diagnosis  POA    Atrial fibrillation [I48.91]  Yes     Priority: High      Resolved Hospital Problems    Diagnosis Date Resolved POA   No  "resolved problems to display.       Discharged Condition: good    Discharge Exam:  BP (!) 155/102   Pulse 108   Temp 97.8 °F (36.6 °C) (Oral)   Resp 19   Ht 5' 7" (1.702 m)   Wt 77.1 kg (170 lb)   SpO2 (!) 94%   BMI 26.63 kg/m²     General Appearance:    Alert, cooperative, no distress, appears stated age   Head:    Normocephalic, without obvious abnormality, atraumatic   Eyes:    PERRL, conjunctiva/corneas clear, EOM's intact, fundi     benign, both eyes        Ears:    Normal TM's and external ear canals, both ears   Nose:   Nares normal, septum midline, mucosa normal, no drainage    or sinus tenderness   Throat:   Lips, mucosa, and tongue normal; teeth and gums normal   Neck:   Supple, symmetrical, trachea midline, no adenopathy;        thyroid:  No enlargement/tenderness/nodules; no carotid    bruit or JVD   Back:     Symmetric, no curvature, ROM normal, no CVA tenderness   Lungs:     Clear to auscultation bilaterally, respirations unlabored   Chest wall:    No tenderness or deformity   Heart:    Regular rate and rhythm, S1 and S2 normal, no murmur, rub   or gallop   Abdomen:     Soft, non-tender, bowel sounds active all four quadrants,     no masses, no organomegaly   Genitalia:    Normal male without lesion, discharge or tenderness   Rectal:    Normal tone, normal prostate, no masses or tenderness;    guaiac negative stool   Extremities:   Extremities normal, atraumatic, no cyanosis or edema   Pulses:   2+ and symmetric all extremities   Skin:   Skin color, texture, turgor normal, no rashes or lesions   Lymph nodes:   Cervical, supraclavicular, and axillary nodes normal   Neurologic:   CNII-XII intact. Normal strength, sensation and reflexes       throughout       Disposition: Home or Self Care    Follow Up/Patient Instructions:     Medications:  Reconciled Home Medications:      Medication List      START taking these medications    amiodarone 200 MG Tab  Commonly known as:  PACERONE  Take 1 tablet " (200 mg total) by mouth 2 (two) times daily.        CHANGE how you take these medications    warfarin 2 MG tablet  Commonly known as:  COUMADIN  Take 1/2 tablet (1mg) on Saturday and Sunday and 1 tablet (2mg) all other days as directed by coumadin clinic. Discontinue 5mg tablet.  What changed:  additional instructions        CONTINUE taking these medications    carvedilol 6.25 MG tablet  Commonly known as:  COREG  Take 1 tablet (6.25 mg total) by mouth 2 (two) times daily with meals.     digoxin 250 mcg tablet  Commonly known as:  LANOXIN  Take 1 tablet (250 mcg total) by mouth once daily.     furosemide 40 MG tablet  Commonly known as:  LASIX  Take 1 tablet (40 mg total) by mouth 2 (two) times daily.     lisinopril 10 MG tablet  Take 1 tablet (10 mg total) by mouth once daily.     pantoprazole 40 MG tablet  Commonly known as:  PROTONIX  Take 1 tablet (40 mg total) by mouth once daily.     potassium chloride SA 20 MEQ tablet  Commonly known as:  K-DUR,KLOR-CON, K-TAB  Take 20 mEq by mouth once daily. Takes two tablets by mouth once a day          No discharge procedures on file.  Follow-up Information     Chano Foreman Md, MD In 4 weeks.    Specialties:  Cardiology, Internal Medicine  Contact information:  [address]  [place] [address] [phone]             Mario Lou MD In 6 weeks.    Specialties:  Electrophysiology, Cardiovascular Disease  Contact information:  [address] [facility] [address]  [phone]                 Activity: activity as tolerated  Diet: cardiac diet  Wound Care: keep wound clean and dry and none needed    Follow-up within 4 week.    Signed:  Chano Foreman Md  4/26/2018  2:08 PM

## 2018-04-26 NOTE — TRANSFER OF CARE
"Anesthesia Transfer of Care Note    Patient: Ute Mcbride    Procedure(s) Performed: Procedure(s) (LRB):  TRANSESOPHAGEAL ECHOCARDIOGRAM (PEGGY) (N/A)    Patient location: Other: cvru    Anesthesia Type: MAC    Transport from OR: Transported from OR on room air with adequate spontaneous ventilation    Post pain: adequate analgesia    Post assessment: no apparent anesthetic complications    Post vital signs: stable    Level of consciousness: awake    Nausea/Vomiting: no nausea/vomiting    Complications: none    Transfer of care protocol was followed      Last vitals:   Visit Vitals  BP (!) 155/102   Pulse 108   Temp 36.6 °C (97.8 °F) (Oral)   Resp 19   Ht 5' 7" (1.702 m)   Wt 77.1 kg (170 lb)   SpO2 (!) 94%   BMI 26.63 kg/m²     "

## 2018-05-03 ENCOUNTER — ANTI-COAG VISIT (OUTPATIENT)
Dept: CARDIOLOGY | Facility: CLINIC | Age: 63
End: 2018-05-03
Payer: MEDICARE

## 2018-05-03 DIAGNOSIS — Z79.01 LONG TERM (CURRENT) USE OF ANTICOAGULANTS: Primary | ICD-10-CM

## 2018-05-03 LAB — INR PPP: 3.4 (ref 2–3)

## 2018-05-03 PROCEDURE — 99211 OFF/OP EST MAY X REQ PHY/QHP: CPT | Mod: PBBFAC

## 2018-05-03 PROCEDURE — 99999 PR PBB SHADOW E&M-EST. PATIENT-LVL I: CPT | Mod: PBBFAC,,,

## 2018-05-03 NOTE — PROGRESS NOTES
Patient's INR is supra therapeutic at 3.4.   Post hospital PEGGY/Cardioversion procedure.  Started Amiodarone 200 mg BID on Friday, 4/27/2018.  No active bleeding at present.  Instructed patient to start new dose of 1 mg (1/2 tablet) Monday through Friday and 2 mg on Saturday and Sunday.  Recheck in 1 week.

## 2018-05-10 ENCOUNTER — ANTI-COAG VISIT (OUTPATIENT)
Dept: CARDIOLOGY | Facility: CLINIC | Age: 63
End: 2018-05-10
Payer: MEDICARE

## 2018-05-10 DIAGNOSIS — Z79.01 LONG TERM (CURRENT) USE OF ANTICOAGULANTS: Primary | ICD-10-CM

## 2018-05-10 LAB — INR PPP: 1.5 (ref 2–3)

## 2018-05-10 PROCEDURE — 85610 PROTHROMBIN TIME: CPT | Mod: PBBFAC

## 2018-05-10 PROCEDURE — 99211 OFF/OP EST MAY X REQ PHY/QHP: CPT | Mod: PBBFAC

## 2018-05-10 PROCEDURE — 99999 PR PBB SHADOW E&M-EST. PATIENT-LVL I: CPT | Mod: PBBFAC,,,

## 2018-05-10 NOTE — PROGRESS NOTES
Patient's INR is sub therapeutic at 1.5.  Reports missing x 1 dose.  Compliance education/risk factors - given.  Instructed patient to take 2 mg today (Thursday) - only, then resume on 1 mg Monday through Friday and 2 mg on Saturday and Sunday.  Recheck in 1 week.

## 2018-05-22 ENCOUNTER — OFFICE VISIT (OUTPATIENT)
Dept: CARDIOLOGY | Facility: CLINIC | Age: 63
End: 2018-05-22
Payer: MEDICARE

## 2018-05-22 ENCOUNTER — ANTI-COAG VISIT (OUTPATIENT)
Dept: CARDIOLOGY | Facility: CLINIC | Age: 63
End: 2018-05-22
Payer: MEDICARE

## 2018-05-22 VITALS
WEIGHT: 174.81 LBS | BODY MASS INDEX: 27.44 KG/M2 | SYSTOLIC BLOOD PRESSURE: 138 MMHG | HEART RATE: 75 BPM | DIASTOLIC BLOOD PRESSURE: 76 MMHG | OXYGEN SATURATION: 98 % | HEIGHT: 67 IN

## 2018-05-22 DIAGNOSIS — Z79.01 LONG TERM (CURRENT) USE OF ANTICOAGULANTS: Primary | ICD-10-CM

## 2018-05-22 DIAGNOSIS — I48.0 PAROXYSMAL ATRIAL FIBRILLATION: Primary | ICD-10-CM

## 2018-05-22 DIAGNOSIS — R79.89 ELEVATED TROPONIN: ICD-10-CM

## 2018-05-22 DIAGNOSIS — I42.8 CARDIOMYOPATHY, NONISCHEMIC: ICD-10-CM

## 2018-05-22 DIAGNOSIS — I50.23 ACUTE ON CHRONIC SYSTOLIC HF (HEART FAILURE): ICD-10-CM

## 2018-05-22 LAB — INR PPP: 3.9 (ref 2–3)

## 2018-05-22 PROCEDURE — 99213 OFFICE O/P EST LOW 20 MIN: CPT | Mod: PBBFAC | Performed by: INTERNAL MEDICINE

## 2018-05-22 PROCEDURE — 99211 OFF/OP EST MAY X REQ PHY/QHP: CPT | Mod: PBBFAC,27

## 2018-05-22 PROCEDURE — 99214 OFFICE O/P EST MOD 30 MIN: CPT | Mod: S$PBB,,, | Performed by: INTERNAL MEDICINE

## 2018-05-22 PROCEDURE — 85610 PROTHROMBIN TIME: CPT | Mod: PBBFAC

## 2018-05-22 PROCEDURE — 99999 PR PBB SHADOW E&M-EST. PATIENT-LVL I: CPT | Mod: PBBFAC,,,

## 2018-05-22 PROCEDURE — 99999 PR PBB SHADOW E&M-EST. PATIENT-LVL III: CPT | Mod: PBBFAC,,, | Performed by: INTERNAL MEDICINE

## 2018-05-22 NOTE — PROGRESS NOTES
Subjective:   Patient ID:  Ute Mcbride is a 63 y.o. male who presents for cardiac consult of Atrial Fibrillation      HPI  The patient came in today for cardiac consult of Atrial Fibrillation    This is a 62 year old male pt with PMHx NICM EF 25%, Chronic AF on coumadin, HTN, alc abuse in past presents for follow up.     2/14/18  Pt said he was doing ok day prior to ED visit but after drinking some beer he felt more tired and started having more palpitations. He had been seen a few years ago by cardiology and had been doing well. He is compliant with meds but is going to run out soon and came to ED for evaluation. He was given IV cardizem as he was in AF RVR and felt fine after HR improved.   ECG - AF RVR, trop negative, . Upon my exam in ED pt felt fine, HR and BP well controlled.  He had 2D ECHO which revealed LVEF 20% as in past with PH with PASP 62 mmHg along with moderate/severe RV dysfunction.   Was discharged from ED to follow up in clinic.     4/9/18  Pt presented to [facility] for PEGGY/DCCV on 3/22/18, pt had positive VERONICA thrombus with subtherapeutic INR prior to study. DCCV was not attempted and discharged to follow up at coumadin clinic. Recent INR supratherapeutic at 4.5. No bleeding issues. Pt walks every day in the morning about a mile, mild SOB but overall ok.     5/22/18  Pt had successful DCCV 4/26/18 after PEGGY revealed no VERONICA thrombus. He was started on amio 200 mg BID. PT feels well overall, no CP/SOB/palpitations. INR elevated today, no bleeding issues. Bp well controlled today.     Patient feels well, no specific complaints, no chest pain, no sob, no leg swelling, no PND, no palpitation, no dizziness, no syncope, no CNS symptoms.    Patient is compliant with medications.    2D ECHO 2/14/18  CONCLUSIONS     1 - Severely depressed left ventricular systolic function (EF 20-25%).     2 - Impaired LV relaxation, increased LVEDP.     3 - Severe left atrial enlargement.     4 - Eccentric  hypertrophy.     5 - Right ventricular enlargement with moderately to severely depressed systolic function.     6 - Pulmonary hypertension. The estimated PA systolic pressure is greater than 62 mmHg.     7 - Mild mitral regurgitation.     8 - Moderate tricuspid regurgitation.     9 - Mild pulmonic regurgitation.       Past Medical History:   Diagnosis Date    Anticoagulant long-term use     but has been noncompliant    Arthritis     Atrial fibrillation, chronic     Cardiomyopathy, nonischemic 11/9/2014    CHF (congestive heart failure)     Coronary artery disease     Gout     Hypertension        Past Surgical History:   Procedure Laterality Date    ANKLE SURGERY      CATARACT EXTRACTION W/  INTRAOCULAR LENS IMPLANT Bilateral        Social History   Substance Use Topics    Smoking status: Never Smoker    Smokeless tobacco: Never Used    Alcohol use 1.2 oz/week     2 Cans of beer per week       History reviewed. No pertinent family history.    Patient's Medications   New Prescriptions    No medications on file   Previous Medications    AMIODARONE (PACERONE) 200 MG TAB    Take 1 tablet (200 mg total) by mouth 2 (two) times daily.    CARVEDILOL (COREG) 6.25 MG TABLET    Take 1 tablet (6.25 mg total) by mouth 2 (two) times daily with meals.    DIGOXIN (LANOXIN) 250 MCG TABLET    Take 1 tablet (250 mcg total) by mouth once daily.    FUROSEMIDE (LASIX) 40 MG TABLET    Take 1 tablet (40 mg total) by mouth 2 (two) times daily.    LISINOPRIL 10 MG TABLET    Take 1 tablet (10 mg total) by mouth once daily.    PANTOPRAZOLE (PROTONIX) 40 MG TABLET    Take 1 tablet (40 mg total) by mouth once daily.    POTASSIUM CHLORIDE SA (K-DUR,KLOR-CON, K-TAB) 20 MEQ TABLET    Take 20 mEq by mouth once daily. Takes two tablets by mouth once a day    WARFARIN (COUMADIN) 2 MG TABLET    Take 1/2 tablet (1mg) on Saturday and Sunday and 1 tablet (2mg) all other days as directed by coumadin clinic. Discontinue 5mg tablet.   Modified  "Medications    No medications on file   Discontinued Medications    No medications on file       Review of Systems   Constitutional: Negative.    HENT: Negative.    Eyes: Negative.    Respiratory: Negative.    Cardiovascular: Negative.    Gastrointestinal: Negative.    Genitourinary: Negative.    Musculoskeletal: Negative.    Skin: Negative.    Neurological: Negative.    Endo/Heme/Allergies: Negative.    Psychiatric/Behavioral: Negative.    All 12 systems otherwise negative.      Wt Readings from Last 3 Encounters:   05/22/18 79.3 kg (174 lb 13.2 oz)   04/26/18 77.1 kg (170 lb)   04/09/18 77.5 kg (170 lb 13.7 oz)     Temp Readings from Last 3 Encounters:   04/26/18 97.8 °F (36.6 °C) (Oral)   03/22/18 97.9 °F (36.6 °C) (Oral)   02/21/18 98.1 °F (36.7 °C) (Oral)     BP Readings from Last 3 Encounters:   05/22/18 138/76   04/26/18 (!) 155/102   04/09/18 (!) 144/80     Pulse Readings from Last 3 Encounters:   05/22/18 75   04/26/18 108   04/09/18 84       /76   Pulse 75   Ht 5' 7.01" (1.702 m)   Wt 79.3 kg (174 lb 13.2 oz)   SpO2 98%   BMI 27.37 kg/m²     Objective:   Physical Exam   Constitutional: He is oriented to person, place, and time. He appears well-developed and well-nourished. No distress.   HENT:   Head: Normocephalic and atraumatic.   Nose: Nose normal.   Mouth/Throat: Oropharynx is clear and moist.   Eyes: Conjunctivae and EOM are normal. No scleral icterus.   Neck: Normal range of motion. Neck supple. No JVD present. No thyromegaly present.   Cardiovascular: Normal rate, S1 normal and S2 normal.  An irregularly irregular rhythm present. Exam reveals no gallop, no S3, no S4 and no friction rub.    No murmur heard.  Pulmonary/Chest: Effort normal and breath sounds normal. No stridor. No respiratory distress. He has no wheezes. He has no rales. He exhibits no tenderness.   Abdominal: Soft. Bowel sounds are normal. He exhibits no distension and no mass. There is no tenderness. There is no rebound. "   Genitourinary:   Genitourinary Comments: Deferred   Musculoskeletal: Normal range of motion. He exhibits no edema, tenderness or deformity.   Lymphadenopathy:     He has no cervical adenopathy.   Neurological: He is alert and oriented to person, place, and time. He exhibits normal muscle tone. Coordination normal.   Skin: Skin is warm and dry. No rash noted. He is not diaphoretic. No erythema. No pallor.   Psychiatric: He has a normal mood and affect. His behavior is normal. Judgment and thought content normal.   Nursing note and vitals reviewed.      Lab Results   Component Value Date     02/20/2018    K 3.7 02/20/2018     02/20/2018    CO2 24 02/20/2018    BUN 10 02/20/2018    CREATININE 0.8 02/20/2018     (H) 02/20/2018    HGBA1C 6.4 (H) 11/07/2014    MG 2.1 11/10/2014    AST 23 02/20/2018    ALT 19 02/20/2018    ALBUMIN 3.5 02/20/2018    PROT 7.6 02/20/2018    BILITOT 1.6 (H) 02/20/2018    WBC 5.78 02/20/2018    HGB 16.1 02/20/2018    HCT 48.3 02/20/2018    MCV 93 02/20/2018     (L) 02/20/2018    INR 3.9 (A) 05/22/2018    INR 4.1 (H) 04/26/2018    TSH 4.36 (H) 01/13/2010     (H) 02/14/2018     Assessment:      1. Paroxysmal atrial fibrillation    2. Elevated troponin    3. Acute on chronic systolic HF (heart failure)    4. Cardiomyopathy, nonischemic        Plan:   1. NICM EF 20-25% - pt euvolemic   - cont Coreg and lisinopril  - continue lasix and K supplement  - will refer for ICD to EP    2. AF -   - cont Coumadin and Coreg and Digoxin  - cont amio  - rate controlled  - needs to avoid alc  - needs coumadin clinic for INR    3. HTN - BP well controlled  - cont meds, low salt diet     Thank you for allowing me to participate in this patient's care. Please do not hesitate to contact me with any questions or concerns. Consult note has been forwarded to the referral physician.

## 2018-06-07 ENCOUNTER — INITIAL CONSULT (OUTPATIENT)
Dept: CARDIOLOGY | Facility: CLINIC | Age: 63
End: 2018-06-07
Payer: MEDICARE

## 2018-06-07 ENCOUNTER — ANTI-COAG VISIT (OUTPATIENT)
Dept: CARDIOLOGY | Facility: CLINIC | Age: 63
End: 2018-06-07
Payer: MEDICARE

## 2018-06-07 VITALS
HEART RATE: 80 BPM | SYSTOLIC BLOOD PRESSURE: 164 MMHG | WEIGHT: 179.38 LBS | DIASTOLIC BLOOD PRESSURE: 92 MMHG | BODY MASS INDEX: 28.08 KG/M2

## 2018-06-07 DIAGNOSIS — I42.8 CARDIOMYOPATHY, NONISCHEMIC: Primary | ICD-10-CM

## 2018-06-07 DIAGNOSIS — I48.21 ATRIAL FIBRILLATION, PERMANENT: ICD-10-CM

## 2018-06-07 DIAGNOSIS — Z79.01 LONG TERM (CURRENT) USE OF ANTICOAGULANTS: Primary | ICD-10-CM

## 2018-06-07 LAB — INR PPP: 3.5 (ref 2–3)

## 2018-06-07 PROCEDURE — 93005 ELECTROCARDIOGRAM TRACING: CPT | Mod: PBBFAC | Performed by: INTERNAL MEDICINE

## 2018-06-07 PROCEDURE — 99213 OFFICE O/P EST LOW 20 MIN: CPT | Mod: PBBFAC | Performed by: INTERNAL MEDICINE

## 2018-06-07 PROCEDURE — 99215 OFFICE O/P EST HI 40 MIN: CPT | Mod: S$PBB,,, | Performed by: INTERNAL MEDICINE

## 2018-06-07 PROCEDURE — 99211 OFF/OP EST MAY X REQ PHY/QHP: CPT | Mod: PBBFAC

## 2018-06-07 PROCEDURE — 99999 PR PBB SHADOW E&M-EST. PATIENT-LVL III: CPT | Mod: PBBFAC,,, | Performed by: INTERNAL MEDICINE

## 2018-06-07 PROCEDURE — 85610 PROTHROMBIN TIME: CPT | Mod: PBBFAC

## 2018-06-07 PROCEDURE — 93010 ELECTROCARDIOGRAM REPORT: CPT | Mod: S$PBB,,, | Performed by: INTERNAL MEDICINE

## 2018-06-07 PROCEDURE — 99999 PR PBB SHADOW E&M-EST. PATIENT-LVL I: CPT | Mod: PBBFAC,,,

## 2018-06-07 NOTE — PROGRESS NOTES
Subjective:    Patient ID:  Ute Mcbride is a 63 y.o. male who presents for evaluation of Atrial Fibrillation (per [place])      63 yoM long-standing persistent AF, cardiomyopathy here for AF management. He has had AF since at least 2010. He has had impaired EF for many years with recent drop to 20-25% 2/18. Cardioversion with amiodarone has been used without maintenance of NSR. After his most recent CV 4/26/18 he has had improvement in symptoms. He has NYHA Class II symptoms. He has never tried amiodarone. He is on warfarin for CVA prophylaxis. He has CHADSVASC of 3 and is on warfarin. 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     Echo 2/18:    CONCLUSIONS     1 - Severely depressed left ventricular systolic function (EF 20-25%).     2 - Impaired LV relaxation, increased LVEDP.     3 - Severe left atrial enlargement.     4 - Eccentric hypertrophy.     5 - Right ventricular enlargement with moderately to severely depressed systolic function.     6 - Pulmonary hypertension. The estimated PA systolic pressure is greater than 62 mmHg.     7 - Mild mitral regurgitation.     8 - Moderate tricuspid regurgitation.     9 - Mild pulmonic regurgitation.     Past Medical History:  No date: Anticoagulant long-term use      Comment: but has been noncompliant  No date: Arthritis  No date: Atrial fibrillation, chronic  11/9/2014: Cardiomyopathy, nonischemic  No date: CHF (congestive heart failure)  No date: Coronary artery disease  No date: Gout  No date: Hypertension    Past Surgical History:  No date: ANKLE SURGERY  No date: CATARACT EXTRACTION W/  INTRAOCULAR LENS IMPLA* Bilateral    Social History    Marital status:              Spouse name:                       Years of education:                 Number of children:               Occupational History    None on file    Social History Main Topics    Smoking status: Never Smoker                                                                Smokeless  tobacco: Never Used                        Alcohol use: Yes           1.2 oz/week       Cans of beer: 2 per week    Drug use: No              Sexual activity: Not on file          Other Topics            Concern    None on file    Social History Narrative    None on file    Review of patient's family history indicates:  Problem: Stroke      Relation: Mother       Age of Onset: (Not Specified)   Problem: Hypertension      Relation: Mother       Age of Onset: (Not Specified)   Problem: Hypertension      Relation: Father       Age of Onset: (Not Specified)           Review of Systems   Constitution: Negative.   HENT: Negative.    Eyes: Negative.    Cardiovascular: Positive for dyspnea on exertion. Negative for chest pain, leg swelling, near-syncope, palpitations and syncope.   Respiratory: Negative.  Negative for shortness of breath.    Endocrine: Negative.    Hematologic/Lymphatic: Negative.    Skin: Negative.    Musculoskeletal: Positive for gout.   Gastrointestinal: Negative.    Genitourinary: Negative.    Neurological: Negative.  Negative for dizziness and light-headedness.   Psychiatric/Behavioral: Negative.    Allergic/Immunologic: Negative.         Objective:    Physical Exam   Constitutional: He is oriented to person, place, and time. He appears well-developed and well-nourished. No distress.   HENT:   Head: Normocephalic and atraumatic.   Eyes: EOM are normal. Pupils are equal, round, and reactive to light.   Neck: Normal range of motion. No JVD present. No thyromegaly present.   Cardiovascular: Normal rate, S1 normal, S2 normal and normal heart sounds.  An irregular rhythm present. PMI is not displaced.  Exam reveals no gallop and no friction rub.    No murmur heard.  Pulmonary/Chest: Effort normal and breath sounds normal. No respiratory distress. He has no wheezes. He has no rales.   Abdominal: Soft. Bowel sounds are normal. He exhibits no distension. There is no tenderness. There is no rebound and no  guarding.   Musculoskeletal: Normal range of motion. He exhibits no edema or tenderness.   Severe gouty tophi bilateral UE   Neurological: He is alert and oriented to person, place, and time. No cranial nerve deficit.   Skin: Skin is warm and dry. No rash noted. He is not diaphoretic. No erythema.   Psychiatric: He has a normal mood and affect. His behavior is normal. Judgment and thought content normal.   Vitals reviewed.    ECG: AF with controlled V rate        Assessment:       1. Cardiomyopathy, nonischemic    2. Atrial fibrillation, permanent         Plan:       63 yoM NICM, long-standing persistent AF here for AF management. He has developed severely depressed EF in the setting of long-standing persistent AF. He has tried CV and amiodarone without success. He felt improvement in symptoms after CV. I offered PVI to hopefully improve EF should we be able to maintain NSR. Given long-standing persistent AF, I cited 50-60% success. I had extensive discussion with patient regarding risks and benefits of PVI/WACA, and he would like to proceed. Risks of procedure include (but are not limited to) bleeding, stroke, perforation requiring emergency draining or surgery, AV block, death, AV fistula, AE fistula, PV stenosis.    Continue anti-coagulation through the procedure.  Discontinue antiarrhytmic drugs 4 days prior to the procedure.     RF PVI  Anesthesia  PEGGY prior  RUDDY

## 2018-06-07 NOTE — PROGRESS NOTES
Patient's INR is supra therapeutic at 3.5.  Reports no signs of any abnormal bleeding at present.  Instructed to hold Warfarin dose today (6/07), then start 1 mg (1/2 tablet) daily.  Recheck in 2 weeks.

## 2018-06-07 NOTE — LETTER
June 7, 2018      Chano Foreman MD  [address] [facility]  [place] [address]           [facility]  [address] [place] [address] , [address]  [place] [address]  Phone: [phone]  Fax: [phone]          Patient: Ute Mcbride   MR Number: [id number]   YOB: 1955   Date of Visit: 6/7/2018       Dear Dr. Chano Foreman:    Thank you for referring Ute Mcbride to me for evaluation. Attached you will find relevant portions of my assessment and plan of care.    If you have questions, please do not hesitate to call me. I look forward to following Ute Mcbride along with you.    Sincerely,    Mario Lou MD    Enclosure  CC:  No Recipients    If you would like to receive this communication electronically, please contact [email or web address] or [phone] to request more information on [employer] access.    For providers and/or their staff who would like to refer a patient to [facility], please contact us through our one-stop-shop provider referral line, [facility], at [phone].    If you feel you have received this communication in error or would no longer like to receive these types of communications, please e-mail [email or web address]

## 2018-06-08 ENCOUNTER — TELEPHONE (OUTPATIENT)
Dept: ELECTROPHYSIOLOGY | Facility: CLINIC | Age: 63
End: 2018-06-08

## 2018-06-08 NOTE — TELEPHONE ENCOUNTER
Called pt to schedule procedure. Pt not available, LM w/ family member including clinic number for pt to call back.

## 2018-06-26 ENCOUNTER — ANTI-COAG VISIT (OUTPATIENT)
Dept: CARDIOLOGY | Facility: CLINIC | Age: 63
End: 2018-06-26
Payer: MEDICARE

## 2018-06-26 DIAGNOSIS — Z79.01 LONG TERM (CURRENT) USE OF ANTICOAGULANTS: Primary | ICD-10-CM

## 2018-06-26 LAB — INR PPP: 2.4 (ref 2–3)

## 2018-06-26 PROCEDURE — 85610 PROTHROMBIN TIME: CPT | Mod: PBBFAC

## 2018-06-26 NOTE — PROGRESS NOTES
Patient's INR is therapeutic at 2.4.  No changes in dose.  Recheck in 3 weeks.  Please call should you have any questions or concerns at [phone] or [phone].

## 2018-07-11 ENCOUNTER — TELEPHONE (OUTPATIENT)
Dept: ELECTROPHYSIOLOGY | Facility: CLINIC | Age: 63
End: 2018-07-11

## 2018-07-16 ENCOUNTER — TELEPHONE (OUTPATIENT)
Dept: ELECTROPHYSIOLOGY | Facility: CLINIC | Age: 63
End: 2018-07-16

## 2018-07-16 DIAGNOSIS — I48.19 PERSISTENT ATRIAL FIBRILLATION: Primary | ICD-10-CM

## 2018-07-17 ENCOUNTER — TELEPHONE (OUTPATIENT)
Dept: ELECTROPHYSIOLOGY | Facility: CLINIC | Age: 63
End: 2018-07-17

## 2018-07-17 NOTE — TELEPHONE ENCOUNTER
----- Message from Kayli Sanchez LPN sent at 7/17/2018  2:37 PM CDT -----  Ok, thanks for the update!  ----- Message -----  From: Soumya Chappell RN  Sent: 7/16/2018   9:49 PM  To: [facility] Coumadin Monitoring Pool    Patient scheduled for ablation on 8/15/2018. Dr Lou wants to continue coumadin but keep in range for procedure.  Thanks!

## 2018-07-19 ENCOUNTER — ANTI-COAG VISIT (OUTPATIENT)
Dept: CARDIOLOGY | Facility: CLINIC | Age: 63
End: 2018-07-19
Payer: MEDICARE

## 2018-07-19 DIAGNOSIS — Z79.01 LONG TERM (CURRENT) USE OF ANTICOAGULANTS: Primary | ICD-10-CM

## 2018-07-19 LAB — INR PPP: 1.7 (ref 2–3)

## 2018-07-19 PROCEDURE — 85610 PROTHROMBIN TIME: CPT | Mod: PBBFAC

## 2018-07-19 PROCEDURE — 99211 OFF/OP EST MAY X REQ PHY/QHP: CPT | Mod: PBBFAC

## 2018-07-19 PROCEDURE — 99999 PR PBB SHADOW E&M-EST. PATIENT-LVL I: CPT | Mod: PBBFAC,,,

## 2018-07-19 NOTE — PROGRESS NOTES
Patient's INR is sub therapeutic at 1.7.   Missed x 1 dose reported.  Patient is scheduled for an impending ablation procedure on 8/15/2018.  Compliance to Warfarin dose instructions explained to patient, including weekly INR monitoring - patient voiced understanding.  Instructed patient to take 2 mg today (7/19) - only, then resume on 1 mg (1/2 tablet) daily per dose calendar given.  Recheck on Tuesday, 7/24/2018.

## 2018-07-23 ENCOUNTER — TELEPHONE (OUTPATIENT)
Dept: PHARMACY | Facility: CLINIC | Age: 63
End: 2018-07-23

## 2018-07-23 NOTE — TELEPHONE ENCOUNTER
Spoke with patient about referral for medication Eliquis or Xarelto.  Patient does not have any medicare part d coverage.  I will mail out application to the patient today.

## 2018-07-24 ENCOUNTER — ANTI-COAG VISIT (OUTPATIENT)
Dept: CARDIOLOGY | Facility: CLINIC | Age: 63
End: 2018-07-24
Payer: MEDICARE

## 2018-07-24 DIAGNOSIS — Z79.01 LONG TERM (CURRENT) USE OF ANTICOAGULANTS: Primary | ICD-10-CM

## 2018-07-24 LAB — INR PPP: 2.2 (ref 2–3)

## 2018-07-24 PROCEDURE — 85610 PROTHROMBIN TIME: CPT | Mod: PBBFAC

## 2018-07-24 NOTE — PROGRESS NOTES
Patient's INR is therapeutic at 2.2.  No changes in dose.  Recheck in 1 week - weekly checks for impending ablation procedure on 8/15/2018.  Please call should you have any questions or concerns at [phone] or [phone].

## 2018-07-25 NOTE — PROGRESS NOTES
ABLATION EDUCATION CHECKLIST      Medications:   · HOLD Amiodarone for 4 days prior to procedure. Last dose will be 8/10/2018  · You may take your other usual morning medications on the day of procedure with a sip of water        PRE-PROCEDURE TESTIN2018 @ 10:40 AM  Pre-Procedure labs have been ordered for you at:  [facility]   · Be sure to arrive at your scheduled time. YOU DO NOT HAVE TO FAST FOR THIS LAB WORK!    DAY OF PROCEDURE:    8/15/2018 @ 5:30 AM  Report to Cardiology Waiting Room on 3rd floor of the Hospital    · Do not eat or drink anything after: 12 mn on the night before your procedure      Directions to the Cardiology Waiting Room  If you park in the Parking Garage:  Take elevators to the 2nd floor  Walk up ramp and turn right by Gold Elevators  Take elevator to the 3rd floor  Upon exiting the elevator, turn away from the clinic areas  Walk long pollock around to front of hospital to area with windows overlooking [facility]  Check in at Reception Desk  OR  If family is dropping you off:  Have them drop you off at the front of the Hospital  (Near the ER, where all the flags are hung).  Take the E elevators to the 3rd floor.  Check in at the Reception Desk in the waiting room.    · You will be spending the night after your procedure.  · You will need someone to drive you home the day after your procedure.  · Your pain during your procedure will be managed by the anesthesia team.     Any need to reschedule or cancel procedures, or any questions regarding your procedures should be addressed directly with the Arrhythmia Department Nurses at the following phone number: [phone]

## 2018-07-31 ENCOUNTER — ANTI-COAG VISIT (OUTPATIENT)
Dept: CARDIOLOGY | Facility: CLINIC | Age: 63
End: 2018-07-31
Payer: MEDICARE

## 2018-07-31 DIAGNOSIS — Z79.01 LONG TERM (CURRENT) USE OF ANTICOAGULANTS: Primary | ICD-10-CM

## 2018-07-31 LAB — INR PPP: 1.8 (ref 2–3)

## 2018-07-31 PROCEDURE — 85610 PROTHROMBIN TIME: CPT | Mod: PBBFAC

## 2018-07-31 PROCEDURE — 99211 OFF/OP EST MAY X REQ PHY/QHP: CPT | Mod: PBBFAC

## 2018-07-31 PROCEDURE — 99999 PR PBB SHADOW E&M-EST. PATIENT-LVL I: CPT | Mod: PBBFAC,,,

## 2018-07-31 NOTE — PROGRESS NOTES
Patient's INR is slightly low at 1.8.  Reports no missed doses or diet changes.  Instructed patient to start new dose of 2 mg on Tuesdays and 1 mg on all other days.  Recheck on 8/09/2018 at lab - pre-op labs for impending ablation procedure scheduled for 8/15/2018.  Post op Coumadin Clinic visit scheduled for 8/21/2018.

## 2018-08-09 ENCOUNTER — LAB VISIT (OUTPATIENT)
Dept: LAB | Facility: HOSPITAL | Age: 63
End: 2018-08-09
Attending: INTERNAL MEDICINE
Payer: MEDICARE

## 2018-08-09 DIAGNOSIS — I48.19 PERSISTENT ATRIAL FIBRILLATION: ICD-10-CM

## 2018-08-09 LAB
ANION GAP SERPL CALC-SCNC: 10 MMOL/L
APTT BLDCRRT: 25.8 SEC
BASOPHILS # BLD AUTO: 0.06 K/UL
BASOPHILS NFR BLD: 1.4 %
BUN SERPL-MCNC: 12 MG/DL
CALCIUM SERPL-MCNC: 9.2 MG/DL
CHLORIDE SERPL-SCNC: 105 MMOL/L
CO2 SERPL-SCNC: 24 MMOL/L
CREAT SERPL-MCNC: 0.9 MG/DL
DIFFERENTIAL METHOD: ABNORMAL
EOSINOPHIL # BLD AUTO: 0.1 K/UL
EOSINOPHIL NFR BLD: 2.3 %
ERYTHROCYTE [DISTWIDTH] IN BLOOD BY AUTOMATED COUNT: 15.2 %
EST. GFR  (AFRICAN AMERICAN): >60 ML/MIN/1.73 M^2
EST. GFR  (NON AFRICAN AMERICAN): >60 ML/MIN/1.73 M^2
GLUCOSE SERPL-MCNC: 202 MG/DL
HCT VFR BLD AUTO: 50.1 %
HGB BLD-MCNC: 16 G/DL
IMM GRANULOCYTES # BLD AUTO: 0.02 K/UL
IMM GRANULOCYTES NFR BLD AUTO: 0.5 %
INR PPP: 1.8
LYMPHOCYTES # BLD AUTO: 1.2 K/UL
LYMPHOCYTES NFR BLD: 27.3 %
MCH RBC QN AUTO: 32.1 PG
MCHC RBC AUTO-ENTMCNC: 31.9 G/DL
MCV RBC AUTO: 101 FL
MONOCYTES # BLD AUTO: 0.5 K/UL
MONOCYTES NFR BLD: 11.6 %
NEUTROPHILS # BLD AUTO: 2.5 K/UL
NEUTROPHILS NFR BLD: 56.9 %
NRBC BLD-RTO: 0 /100 WBC
PLATELET # BLD AUTO: 140 K/UL
PMV BLD AUTO: 11 FL
POTASSIUM SERPL-SCNC: 4.1 MMOL/L
PROTHROMBIN TIME: 17.7 SEC
RBC # BLD AUTO: 4.98 M/UL
SODIUM SERPL-SCNC: 139 MMOL/L
WBC # BLD AUTO: 4.32 K/UL

## 2018-08-09 PROCEDURE — 85730 THROMBOPLASTIN TIME PARTIAL: CPT

## 2018-08-09 PROCEDURE — 85610 PROTHROMBIN TIME: CPT

## 2018-08-09 PROCEDURE — 85025 COMPLETE CBC W/AUTO DIFF WBC: CPT

## 2018-08-09 PROCEDURE — 80048 BASIC METABOLIC PNL TOTAL CA: CPT

## 2018-08-09 PROCEDURE — 36415 COLL VENOUS BLD VENIPUNCTURE: CPT

## 2018-08-10 ENCOUNTER — ANTI-COAG VISIT (OUTPATIENT)
Dept: CARDIOLOGY | Facility: CLINIC | Age: 63
End: 2018-08-10

## 2018-08-10 ENCOUNTER — TELEPHONE (OUTPATIENT)
Dept: CARDIOLOGY | Facility: CLINIC | Age: 63
End: 2018-08-10

## 2018-08-10 NOTE — PROGRESS NOTES
Patient's INR remains subtherapeutic at 1.8.  Reports no missed doses.  Instructed patient to start new dose of 2 mg on Tuesday and Friday; and 1 mg on all other days.  Ablation procedure scheduled for 8/15/2018.  Recheck on 8/21/2018 - post procedure.  Patient repeated back instructions and voiced understanding.

## 2018-08-14 ENCOUNTER — ANESTHESIA EVENT (OUTPATIENT)
Dept: MEDSURG UNIT | Facility: HOSPITAL | Age: 63
DRG: 981 | End: 2018-08-14
Payer: MEDICARE

## 2018-08-14 ENCOUNTER — TELEPHONE (OUTPATIENT)
Dept: ELECTROPHYSIOLOGY | Facility: CLINIC | Age: 63
End: 2018-08-14

## 2018-08-14 NOTE — TELEPHONE ENCOUNTER
Spoke to patient      CONFIRMED procedure arrival time of 5:30am  Reiterated instructions including:  -Directions to check in desk- SSCU 3rd floor of hospital  -NPO after midnight night prior to procedure  -Confirmed compliance of coumadin   -Confirmed that he stopped amiodarone on 8/10/18, as instructed         Pt verbalizes understanding of above and appreciates call.

## 2018-08-15 ENCOUNTER — ANESTHESIA (OUTPATIENT)
Dept: MEDSURG UNIT | Facility: HOSPITAL | Age: 63
DRG: 981 | End: 2018-08-15
Payer: MEDICARE

## 2018-08-15 ENCOUNTER — HOSPITAL ENCOUNTER (INPATIENT)
Facility: HOSPITAL | Age: 63
LOS: 2 days | Discharge: HOME | DRG: 981 | End: 2018-08-17
Attending: INTERNAL MEDICINE | Admitting: INTERNAL MEDICINE
Payer: MEDICARE

## 2018-08-15 ENCOUNTER — HOSPITAL ENCOUNTER (OUTPATIENT)
Dept: CARDIOLOGY | Facility: CLINIC | Age: 63
Discharge: HOME | DRG: 981 | End: 2018-08-15
Attending: INTERNAL MEDICINE | Admitting: INTERNAL MEDICINE
Payer: MEDICARE

## 2018-08-15 DIAGNOSIS — I48.21 ATRIAL FIBRILLATION, PERMANENT: ICD-10-CM

## 2018-08-15 DIAGNOSIS — I48.19 PERSISTENT ATRIAL FIBRILLATION: Primary | ICD-10-CM

## 2018-08-15 DIAGNOSIS — I48.91 A-FIB: ICD-10-CM

## 2018-08-15 DIAGNOSIS — I48.91 AF (ATRIAL FIBRILLATION): ICD-10-CM

## 2018-08-15 DIAGNOSIS — I50.23 ACUTE ON CHRONIC SYSTOLIC HF (HEART FAILURE): ICD-10-CM

## 2018-08-15 DIAGNOSIS — J96.01 ACUTE HYPOXEMIC RESPIRATORY FAILURE: ICD-10-CM

## 2018-08-15 DIAGNOSIS — D64.9 ANEMIA: ICD-10-CM

## 2018-08-15 DIAGNOSIS — I48.91 ATRIAL FIBRILLATION: ICD-10-CM

## 2018-08-15 PROBLEM — M1A.9XX1: Status: ACTIVE | Noted: 2018-08-15

## 2018-08-15 PROBLEM — I48.0 PAROXYSMAL ATRIAL FIBRILLATION: Status: ACTIVE | Noted: 2018-08-15

## 2018-08-15 LAB
ABO + RH BLD: NORMAL
ALBUMIN SERPL BCP-MCNC: 3.4 G/DL
ALLENS TEST: ABNORMAL
ALP SERPL-CCNC: 109 U/L
ALT SERPL W/O P-5'-P-CCNC: 17 U/L
ANION GAP SERPL CALC-SCNC: 12 MMOL/L
APTT BLDCRRT: 25.2 SEC
APTT BLDCRRT: 25.8 SEC
AST SERPL-CCNC: 31 U/L
BASOPHILS # BLD AUTO: 0.1 K/UL
BASOPHILS NFR BLD: 1.2 %
BILIRUB SERPL-MCNC: 1.9 MG/DL
BLD GP AB SCN CELLS X3 SERPL QL: NORMAL
BNP SERPL-MCNC: 703 PG/ML
BUN SERPL-MCNC: 10 MG/DL
CALCIUM SERPL-MCNC: 8.3 MG/DL
CHLORIDE SERPL-SCNC: 107 MMOL/L
CO2 SERPL-SCNC: 20 MMOL/L
CREAT SERPL-MCNC: 0.8 MG/DL
DELSYS: ABNORMAL
DIFFERENTIAL METHOD: ABNORMAL
EOSINOPHIL # BLD AUTO: 0.1 K/UL
EOSINOPHIL NFR BLD: 1.1 %
ERYTHROCYTE [DISTWIDTH] IN BLOOD BY AUTOMATED COUNT: 14.8 %
ERYTHROCYTE [SEDIMENTATION RATE] IN BLOOD BY WESTERGREN METHOD: 16 MM/H
EST. GFR  (AFRICAN AMERICAN): >60 ML/MIN/1.73 M^2
EST. GFR  (NON AFRICAN AMERICAN): >60 ML/MIN/1.73 M^2
GLUCOSE SERPL-MCNC: 192 MG/DL
HCO3 UR-SCNC: 20.6 MMOL/L (ref 24–28)
HCT VFR BLD AUTO: 47.7 %
HGB BLD-MCNC: 15.4 G/DL
IMM GRANULOCYTES # BLD AUTO: 0.12 K/UL
IMM GRANULOCYTES NFR BLD AUTO: 1.4 %
INR PPP: 2
LYMPHOCYTES # BLD AUTO: 2.1 K/UL
LYMPHOCYTES NFR BLD: 24.9 %
MAGNESIUM SERPL-MCNC: 1.4 MG/DL
MCH RBC QN AUTO: 31.4 PG
MCHC RBC AUTO-ENTMCNC: 32.3 G/DL
MCV RBC AUTO: 97 FL
MITRAL VALVE MOBILITY: NORMAL
MITRAL VALVE REGURGITATION: ABNORMAL
MODE: ABNORMAL
MONOCYTES # BLD AUTO: 0.6 K/UL
MONOCYTES NFR BLD: 7 %
NEUTROPHILS # BLD AUTO: 5.4 K/UL
NEUTROPHILS NFR BLD: 64.4 %
NRBC BLD-RTO: 0 /100 WBC
PCO2 BLDA: 33.8 MMHG (ref 35–45)
PEEP: 10
PH SMN: 7.39 [PH] (ref 7.35–7.45)
PHOSPHATE SERPL-MCNC: 3.8 MG/DL
PLATELET # BLD AUTO: 154 K/UL
PMV BLD AUTO: 10.5 FL
PO2 BLDA: 183 MMHG (ref 80–100)
POC BE: -4 MMOL/L
POC SATURATED O2: 100 % (ref 95–100)
POC TCO2: 22 MMOL/L (ref 23–27)
POCT GLUCOSE: 178 MG/DL (ref 70–110)
POCT GLUCOSE: 99 MG/DL (ref 70–110)
POTASSIUM SERPL-SCNC: 3.9 MMOL/L
PROT SERPL-MCNC: 7.2 G/DL
PROTHROMBIN TIME: 19.8 SEC
RBC # BLD AUTO: 4.9 M/UL
RETIRED EF AND QEF - SEE NOTES: 20 (ref 55–65)
SAMPLE: ABNORMAL
SITE: ABNORMAL
SODIUM SERPL-SCNC: 139 MMOL/L
TRICUSPID VALVE REGURGITATION: ABNORMAL
TROPONIN I SERPL DL<=0.01 NG/ML-MCNC: 0.84 NG/ML
URATE SERPL-MCNC: 9.8 MG/DL
VT: 480
WBC # BLD AUTO: 8.38 K/UL

## 2018-08-15 PROCEDURE — 83735 ASSAY OF MAGNESIUM: CPT

## 2018-08-15 PROCEDURE — 99900035 HC TECH TIME PER 15 MIN (STAT)

## 2018-08-15 PROCEDURE — 82803 BLOOD GASES ANY COMBINATION: CPT

## 2018-08-15 PROCEDURE — 84484 ASSAY OF TROPONIN QUANT: CPT

## 2018-08-15 PROCEDURE — 25000003 PHARM REV CODE 250: Performed by: STUDENT IN AN ORGANIZED HEALTH CARE EDUCATION/TRAINING PROGRAM

## 2018-08-15 PROCEDURE — 93010 ELECTROCARDIOGRAM REPORT: CPT | Mod: ,,, | Performed by: INTERNAL MEDICINE

## 2018-08-15 PROCEDURE — 86901 BLOOD TYPING SEROLOGIC RH(D): CPT

## 2018-08-15 PROCEDURE — 85730 THROMBOPLASTIN TIME PARTIAL: CPT | Mod: 91

## 2018-08-15 PROCEDURE — 37000008 HC ANESTHESIA 1ST 15 MINUTES: Performed by: INTERNAL MEDICINE

## 2018-08-15 PROCEDURE — 02K83ZZ MAP CONDUCTION MECHANISM, PERCUTANEOUS APPROACH: ICD-10-PCS | Performed by: INTERNAL MEDICINE

## 2018-08-15 PROCEDURE — 25000003 PHARM REV CODE 250: Performed by: NURSE PRACTITIONER

## 2018-08-15 PROCEDURE — 85610 PROTHROMBIN TIME: CPT

## 2018-08-15 PROCEDURE — 36620 INSERTION CATHETER ARTERY: CPT | Mod: 59,,, | Performed by: ANESTHESIOLOGY

## 2018-08-15 PROCEDURE — 93355 ECHO TRANSESOPHAGEAL (TEE): CPT | Mod: ,,, | Performed by: INTERNAL MEDICINE

## 2018-08-15 PROCEDURE — 93613 INTRACARDIAC EPHYS 3D MAPG: CPT | Mod: ,,, | Performed by: INTERNAL MEDICINE

## 2018-08-15 PROCEDURE — 85730 THROMBOPLASTIN TIME PARTIAL: CPT

## 2018-08-15 PROCEDURE — D9220A PRA ANESTHESIA: Mod: ANES,,, | Performed by: ANESTHESIOLOGY

## 2018-08-15 PROCEDURE — 99223 1ST HOSP IP/OBS HIGH 75: CPT | Mod: AI,GC,, | Performed by: INTERNAL MEDICINE

## 2018-08-15 PROCEDURE — 5A1945Z RESPIRATORY VENTILATION, 24-96 CONSECUTIVE HOURS: ICD-10-PCS | Performed by: INTERNAL MEDICINE

## 2018-08-15 PROCEDURE — 93662 INTRACARDIAC ECG (ICE): CPT | Mod: 26,,, | Performed by: INTERNAL MEDICINE

## 2018-08-15 PROCEDURE — 27200198 EP LAB PROCEDURE

## 2018-08-15 PROCEDURE — 94002 VENT MGMT INPAT INIT DAY: CPT

## 2018-08-15 PROCEDURE — C9113 INJ PANTOPRAZOLE SODIUM, VIA: HCPCS | Performed by: STUDENT IN AN ORGANIZED HEALTH CARE EDUCATION/TRAINING PROGRAM

## 2018-08-15 PROCEDURE — 25000003 PHARM REV CODE 250: Performed by: NURSE ANESTHETIST, CERTIFIED REGISTERED

## 2018-08-15 PROCEDURE — C1732 CATH, EP, DIAG/ABL, 3D/VECT: HCPCS

## 2018-08-15 PROCEDURE — 63600175 PHARM REV CODE 636 W HCPCS: Performed by: INTERNAL MEDICINE

## 2018-08-15 PROCEDURE — 20000000 HC ICU ROOM

## 2018-08-15 PROCEDURE — 25000242 PHARM REV CODE 250 ALT 637 W/ HCPCS: Performed by: NURSE ANESTHETIST, CERTIFIED REGISTERED

## 2018-08-15 PROCEDURE — 94761 N-INVAS EAR/PLS OXIMETRY MLT: CPT

## 2018-08-15 PROCEDURE — 85025 COMPLETE CBC W/AUTO DIFF WBC: CPT

## 2018-08-15 PROCEDURE — 63600175 PHARM REV CODE 636 W HCPCS: Performed by: STUDENT IN AN ORGANIZED HEALTH CARE EDUCATION/TRAINING PROGRAM

## 2018-08-15 PROCEDURE — 27200677 HC TRANSDUCER MONITOR KIT SINGLE: Performed by: NURSE ANESTHETIST, CERTIFIED REGISTERED

## 2018-08-15 PROCEDURE — 84550 ASSAY OF BLOOD/URIC ACID: CPT

## 2018-08-15 PROCEDURE — 93656 COMPRE EP EVAL ABLTJ ATR FIB: CPT | Mod: ,,, | Performed by: INTERNAL MEDICINE

## 2018-08-15 PROCEDURE — 63600175 PHARM REV CODE 636 W HCPCS: Performed by: NURSE ANESTHETIST, CERTIFIED REGISTERED

## 2018-08-15 PROCEDURE — 02583ZZ DESTRUCTION OF CONDUCTION MECHANISM, PERCUTANEOUS APPROACH: ICD-10-PCS | Performed by: INTERNAL MEDICINE

## 2018-08-15 PROCEDURE — 27000221 HC OXYGEN, UP TO 24 HOURS

## 2018-08-15 PROCEDURE — 93005 ELECTROCARDIOGRAM TRACING: CPT

## 2018-08-15 PROCEDURE — 37799 UNLISTED PX VASCULAR SURGERY: CPT

## 2018-08-15 PROCEDURE — 5A2204Z RESTORATION OF CARDIAC RHYTHM, SINGLE: ICD-10-PCS | Performed by: INTERNAL MEDICINE

## 2018-08-15 PROCEDURE — 84100 ASSAY OF PHOSPHORUS: CPT

## 2018-08-15 PROCEDURE — 25000003 PHARM REV CODE 250

## 2018-08-15 PROCEDURE — 27201037 HC PRESSURE MONITORING SET UP

## 2018-08-15 PROCEDURE — D9220A PRA ANESTHESIA: Mod: CRNA,,, | Performed by: NURSE ANESTHETIST, CERTIFIED REGISTERED

## 2018-08-15 PROCEDURE — 80053 COMPREHEN METABOLIC PANEL: CPT

## 2018-08-15 PROCEDURE — 63600175 PHARM REV CODE 636 W HCPCS

## 2018-08-15 PROCEDURE — 27800505 HC CATH, RADIAL ARTERY KIT: Performed by: NURSE ANESTHETIST, CERTIFIED REGISTERED

## 2018-08-15 PROCEDURE — 0BH17EZ INSERTION OF ENDOTRACHEAL AIRWAY INTO TRACHEA, VIA NATURAL OR ARTIFICIAL OPENING: ICD-10-PCS | Performed by: INTERNAL MEDICINE

## 2018-08-15 PROCEDURE — 93320 DOPPLER ECHO COMPLETE: CPT

## 2018-08-15 PROCEDURE — 25500020 PHARM REV CODE 255

## 2018-08-15 PROCEDURE — 99900026 HC AIRWAY MAINTENANCE (STAT)

## 2018-08-15 PROCEDURE — 83880 ASSAY OF NATRIURETIC PEPTIDE: CPT

## 2018-08-15 PROCEDURE — 37000009 HC ANESTHESIA EA ADD 15 MINS: Performed by: INTERNAL MEDICINE

## 2018-08-15 RX ORDER — DIGOXIN 125 MCG
250 TABLET ORAL DAILY
Status: DISCONTINUED | OUTPATIENT
Start: 2018-08-15 | End: 2018-08-17 | Stop reason: HOSPADM

## 2018-08-15 RX ORDER — DEXMEDETOMIDINE HYDROCHLORIDE 4 UG/ML
0.2 INJECTION, SOLUTION INTRAVENOUS CONTINUOUS
Status: DISCONTINUED | OUTPATIENT
Start: 2018-08-15 | End: 2018-08-15

## 2018-08-15 RX ORDER — GLYCOPYRROLATE 0.2 MG/ML
INJECTION INTRAMUSCULAR; INTRAVENOUS
Status: DISCONTINUED | OUTPATIENT
Start: 2018-08-15 | End: 2018-08-15

## 2018-08-15 RX ORDER — PROTAMINE SULFATE 10 MG/ML
INJECTION, SOLUTION INTRAVENOUS
Status: DISCONTINUED | OUTPATIENT
Start: 2018-08-15 | End: 2018-08-15

## 2018-08-15 RX ORDER — ALBUTEROL SULFATE 90 UG/1
AEROSOL, METERED RESPIRATORY (INHALATION)
Status: DISCONTINUED | OUTPATIENT
Start: 2018-08-15 | End: 2018-08-15

## 2018-08-15 RX ORDER — CARVEDILOL 6.25 MG/1
6.25 TABLET ORAL 2 TIMES DAILY WITH MEALS
Status: CANCELLED | OUTPATIENT
Start: 2018-08-15

## 2018-08-15 RX ORDER — FUROSEMIDE 10 MG/ML
60 INJECTION INTRAMUSCULAR; INTRAVENOUS ONCE
Status: COMPLETED | OUTPATIENT
Start: 2018-08-15 | End: 2018-08-15

## 2018-08-15 RX ORDER — DOBUTAMINE HYDROCHLORIDE 200 MG/100ML
2.5 INJECTION INTRAVENOUS CONTINUOUS
Status: DISCONTINUED | OUTPATIENT
Start: 2018-08-15 | End: 2018-08-16

## 2018-08-15 RX ORDER — FENTANYL CITRATE 50 UG/ML
INJECTION, SOLUTION INTRAMUSCULAR; INTRAVENOUS
Status: DISCONTINUED | OUTPATIENT
Start: 2018-08-15 | End: 2018-08-15

## 2018-08-15 RX ORDER — DOPAMINE HYDROCHLORIDE 160 MG/100ML
INJECTION, SOLUTION INTRAVENOUS CONTINUOUS PRN
Status: DISCONTINUED | OUTPATIENT
Start: 2018-08-15 | End: 2018-08-15

## 2018-08-15 RX ORDER — WARFARIN 1 MG/1
1 TABLET ORAL DAILY
Status: DISCONTINUED | OUTPATIENT
Start: 2018-08-15 | End: 2018-08-15

## 2018-08-15 RX ORDER — MIDAZOLAM HYDROCHLORIDE 1 MG/ML
1 INJECTION INTRAMUSCULAR; INTRAVENOUS EVERY 4 HOURS PRN
Status: DISCONTINUED | OUTPATIENT
Start: 2018-08-15 | End: 2018-08-16

## 2018-08-15 RX ORDER — NOREPINEPHRINE BITARTRATE/D5W 4MG/250ML
0.02 PLASTIC BAG, INJECTION (ML) INTRAVENOUS CONTINUOUS
Status: DISCONTINUED | OUTPATIENT
Start: 2018-08-15 | End: 2018-08-16

## 2018-08-15 RX ORDER — NOREPINEPHRINE BITARTRATE/D5W 4MG/250ML
0.02 PLASTIC BAG, INJECTION (ML) INTRAVENOUS CONTINUOUS
Status: DISCONTINUED | OUTPATIENT
Start: 2018-08-15 | End: 2018-08-15

## 2018-08-15 RX ORDER — PROPOFOL 10 MG/ML
5 INJECTION, EMULSION INTRAVENOUS CONTINUOUS
Status: DISCONTINUED | OUTPATIENT
Start: 2018-08-15 | End: 2018-08-16

## 2018-08-15 RX ORDER — VASOPRESSIN 20 [USP'U]/ML
INJECTION, SOLUTION INTRAMUSCULAR; SUBCUTANEOUS
Status: DISCONTINUED | OUTPATIENT
Start: 2018-08-15 | End: 2018-08-15

## 2018-08-15 RX ORDER — PROPOFOL 10 MG/ML
VIAL (ML) INTRAVENOUS
Status: DISCONTINUED | OUTPATIENT
Start: 2018-08-15 | End: 2018-08-15

## 2018-08-15 RX ORDER — SUCCINYLCHOLINE CHLORIDE 20 MG/ML
INJECTION INTRAMUSCULAR; INTRAVENOUS
Status: DISCONTINUED | OUTPATIENT
Start: 2018-08-15 | End: 2018-08-15

## 2018-08-15 RX ORDER — HEPARIN SODIUM,PORCINE/D5W 25000/250
12 INTRAVENOUS SOLUTION INTRAVENOUS CONTINUOUS
Status: DISCONTINUED | OUTPATIENT
Start: 2018-08-15 | End: 2018-08-16

## 2018-08-15 RX ORDER — PROPOFOL 10 MG/ML
VIAL (ML) INTRAVENOUS CONTINUOUS PRN
Status: DISCONTINUED | OUTPATIENT
Start: 2018-08-15 | End: 2018-08-15

## 2018-08-15 RX ORDER — AMIODARONE HYDROCHLORIDE 200 MG/1
200 TABLET ORAL DAILY
Status: DISCONTINUED | OUTPATIENT
Start: 2018-08-15 | End: 2018-08-17 | Stop reason: HOSPADM

## 2018-08-15 RX ORDER — FENTANYL CITRATE-0.9 % NACL/PF 10 MCG/ML
PLASTIC BAG, INJECTION (ML) INTRAVENOUS CONTINUOUS
Status: DISCONTINUED | OUTPATIENT
Start: 2018-08-15 | End: 2018-08-16

## 2018-08-15 RX ORDER — VASOPRESSIN 20 [USP'U]/ML
INJECTION, SOLUTION INTRAMUSCULAR; SUBCUTANEOUS CONTINUOUS PRN
Status: DISCONTINUED | OUTPATIENT
Start: 2018-08-15 | End: 2018-08-15

## 2018-08-15 RX ORDER — MAGNESIUM SULFATE HEPTAHYDRATE 40 MG/ML
2 INJECTION, SOLUTION INTRAVENOUS
Status: COMPLETED | OUTPATIENT
Start: 2018-08-15 | End: 2018-08-15

## 2018-08-15 RX ORDER — LIDOCAINE HCL/PF 100 MG/5ML
SYRINGE (ML) INTRAVENOUS
Status: DISCONTINUED | OUTPATIENT
Start: 2018-08-15 | End: 2018-08-15

## 2018-08-15 RX ORDER — HEPARIN SODIUM 1000 [USP'U]/ML
INJECTION, SOLUTION INTRAVENOUS; SUBCUTANEOUS
Status: DISCONTINUED | OUTPATIENT
Start: 2018-08-15 | End: 2018-08-15

## 2018-08-15 RX ORDER — ROCURONIUM BROMIDE 10 MG/ML
INJECTION, SOLUTION INTRAVENOUS
Status: DISCONTINUED | OUTPATIENT
Start: 2018-08-15 | End: 2018-08-15

## 2018-08-15 RX ORDER — SODIUM CHLORIDE 9 MG/ML
INJECTION, SOLUTION INTRAVENOUS CONTINUOUS
Status: DISCONTINUED | OUTPATIENT
Start: 2018-08-15 | End: 2018-08-15

## 2018-08-15 RX ORDER — EPINEPHRINE 1 MG/ML
INJECTION, SOLUTION INTRACARDIAC; INTRAMUSCULAR; INTRAVENOUS; SUBCUTANEOUS
Status: DISCONTINUED | OUTPATIENT
Start: 2018-08-15 | End: 2018-08-15

## 2018-08-15 RX ORDER — FENTANYL CITRATE 50 UG/ML
25 INJECTION, SOLUTION INTRAMUSCULAR; INTRAVENOUS EVERY 5 MIN PRN
Status: CANCELLED | OUTPATIENT
Start: 2018-08-15

## 2018-08-15 RX ORDER — SODIUM CHLORIDE 0.9 % (FLUSH) 0.9 %
3 SYRINGE (ML) INJECTION
Status: CANCELLED | OUTPATIENT
Start: 2018-08-15

## 2018-08-15 RX ORDER — ETOMIDATE 2 MG/ML
INJECTION INTRAVENOUS
Status: DISCONTINUED | OUTPATIENT
Start: 2018-08-15 | End: 2018-08-15

## 2018-08-15 RX ORDER — FUROSEMIDE 10 MG/ML
INJECTION INTRAMUSCULAR; INTRAVENOUS
Status: DISCONTINUED | OUTPATIENT
Start: 2018-08-15 | End: 2018-08-15

## 2018-08-15 RX ORDER — PANTOPRAZOLE SODIUM 40 MG/10ML
40 INJECTION, POWDER, LYOPHILIZED, FOR SOLUTION INTRAVENOUS DAILY
Status: DISCONTINUED | OUTPATIENT
Start: 2018-08-15 | End: 2018-08-15

## 2018-08-15 RX ORDER — MIDAZOLAM HYDROCHLORIDE 1 MG/ML
INJECTION, SOLUTION INTRAMUSCULAR; INTRAVENOUS
Status: DISCONTINUED | OUTPATIENT
Start: 2018-08-15 | End: 2018-08-15

## 2018-08-15 RX ORDER — HEPARIN SODIUM,PORCINE/D5W 25000/250
12 INTRAVENOUS SOLUTION INTRAVENOUS CONTINUOUS
Status: DISCONTINUED | OUTPATIENT
Start: 2018-08-15 | End: 2018-08-15

## 2018-08-15 RX ADMIN — SODIUM CHLORIDE 1000 ML: 0.9 INJECTION, SOLUTION INTRAVENOUS at 06:08

## 2018-08-15 RX ADMIN — ALBUTEROL SULFATE 4 PUFF: 90 AEROSOL, METERED RESPIRATORY (INHALATION) at 08:08

## 2018-08-15 RX ADMIN — ROCURONIUM BROMIDE 45 MG: 10 INJECTION, SOLUTION INTRAVENOUS at 01:08

## 2018-08-15 RX ADMIN — PROPOFOL 25 MCG/KG/MIN: 10 INJECTION, EMULSION INTRAVENOUS at 01:08

## 2018-08-15 RX ADMIN — ETOMIDATE 10 MG: 2 INJECTION, SOLUTION INTRAVENOUS at 08:08

## 2018-08-15 RX ADMIN — SODIUM CHLORIDE, SODIUM GLUCONATE, SODIUM ACETATE, POTASSIUM CHLORIDE, MAGNESIUM CHLORIDE, SODIUM PHOSPHATE, DIBASIC, AND POTASSIUM PHOSPHATE: .53; .5; .37; .037; .03; .012; .00082 INJECTION, SOLUTION INTRAVENOUS at 08:08

## 2018-08-15 RX ADMIN — ALBUTEROL SULFATE 2 PUFF: 90 AEROSOL, METERED RESPIRATORY (INHALATION) at 08:08

## 2018-08-15 RX ADMIN — DOBUTAMINE IN DEXTROSE 2.5 MCG/KG/MIN: 200 INJECTION, SOLUTION INTRAVENOUS at 04:08

## 2018-08-15 RX ADMIN — HEPARIN SODIUM 2000 UNITS: 1000 INJECTION INTRAVENOUS; SUBCUTANEOUS at 10:08

## 2018-08-15 RX ADMIN — LIDOCAINE HYDROCHLORIDE 100 MG: 20 INJECTION, SOLUTION INTRAVENOUS at 08:08

## 2018-08-15 RX ADMIN — EPINEPHRINE 5 MCG: 1 INJECTION, SOLUTION INTRAMUSCULAR; SUBCUTANEOUS at 08:08

## 2018-08-15 RX ADMIN — EPINEPHRINE 10 MCG: 1 INJECTION, SOLUTION INTRAMUSCULAR; SUBCUTANEOUS at 08:08

## 2018-08-15 RX ADMIN — MAGNESIUM SULFATE IN WATER 2 G: 40 INJECTION, SOLUTION INTRAVENOUS at 05:08

## 2018-08-15 RX ADMIN — HEPARIN SODIUM 10000 UNITS: 1000 INJECTION INTRAVENOUS; SUBCUTANEOUS at 09:08

## 2018-08-15 RX ADMIN — FENTANYL CITRATE 50 MCG: 50 INJECTION, SOLUTION INTRAMUSCULAR; INTRAVENOUS at 08:08

## 2018-08-15 RX ADMIN — FUROSEMIDE 5 MG/HR: 10 INJECTION, SOLUTION INTRAMUSCULAR; INTRAVENOUS at 04:08

## 2018-08-15 RX ADMIN — EPINEPHRINE 0.02 MCG/KG/MIN: 1 INJECTION INTRAMUSCULAR; INTRAVENOUS; SUBCUTANEOUS at 11:08

## 2018-08-15 RX ADMIN — Medication 0.02 MCG/KG/MIN: at 07:08

## 2018-08-15 RX ADMIN — DOPAMINE HYDROCHLORIDE IN DEXTROSE 3 MCG/KG/MIN: 1.6 INJECTION, SOLUTION INTRAVENOUS at 09:08

## 2018-08-15 RX ADMIN — PROPOFOL 20 MCG/KG/MIN: 10 INJECTION, EMULSION INTRAVENOUS at 03:08

## 2018-08-15 RX ADMIN — FUROSEMIDE 20 MG: 10 INJECTION, SOLUTION INTRAMUSCULAR; INTRAVENOUS at 10:08

## 2018-08-15 RX ADMIN — EPINEPHRINE 10 MCG: 1 INJECTION, SOLUTION INTRAMUSCULAR; SUBCUTANEOUS at 09:08

## 2018-08-15 RX ADMIN — ROCURONIUM BROMIDE 5 MG: 10 INJECTION, SOLUTION INTRAVENOUS at 08:08

## 2018-08-15 RX ADMIN — SUCCINYLCHOLINE CHLORIDE 120 MG: 20 INJECTION, SOLUTION INTRAMUSCULAR; INTRAVENOUS at 08:08

## 2018-08-15 RX ADMIN — FUROSEMIDE 20 MG: 10 INJECTION, SOLUTION INTRAMUSCULAR; INTRAVENOUS at 11:08

## 2018-08-15 RX ADMIN — PROTAMINE SULFATE 60 MG: 10 INJECTION, SOLUTION INTRAVENOUS at 12:08

## 2018-08-15 RX ADMIN — HEPARIN SODIUM 3000 UNITS: 1000 INJECTION INTRAVENOUS; SUBCUTANEOUS at 10:08

## 2018-08-15 RX ADMIN — VASOPRESSIN 1 UNITS: 20 INJECTION INTRAVENOUS at 08:08

## 2018-08-15 RX ADMIN — PROPOFOL 30 MG: 10 INJECTION, EMULSION INTRAVENOUS at 08:08

## 2018-08-15 RX ADMIN — VASOPRESSIN 1 UNITS: 20 INJECTION INTRAVENOUS at 12:08

## 2018-08-15 RX ADMIN — FENTANYL CITRATE 100 MCG: 50 INJECTION, SOLUTION INTRAMUSCULAR; INTRAVENOUS at 08:08

## 2018-08-15 RX ADMIN — FUROSEMIDE 60 MG: 10 INJECTION, SOLUTION INTRAMUSCULAR; INTRAVENOUS at 04:08

## 2018-08-15 RX ADMIN — Medication 30 MCG/HR: at 02:08

## 2018-08-15 RX ADMIN — PROPOFOL 20 MCG/KG/MIN: 10 INJECTION, EMULSION INTRAVENOUS at 06:08

## 2018-08-15 RX ADMIN — MIDAZOLAM 2 MG: 1 INJECTION INTRAMUSCULAR; INTRAVENOUS at 08:08

## 2018-08-15 RX ADMIN — DEXTROSE 40 MG: 50 INJECTION, SOLUTION INTRAVENOUS at 03:08

## 2018-08-15 RX ADMIN — HEPARIN SODIUM AND DEXTROSE 12 UNITS/KG/HR: 10000; 5 INJECTION INTRAVENOUS at 03:08

## 2018-08-15 RX ADMIN — HEPARIN SODIUM 8000 UNITS: 1000 INJECTION INTRAVENOUS; SUBCUTANEOUS at 09:08

## 2018-08-15 RX ADMIN — FENTANYL CITRATE 50 MCG: 50 INJECTION, SOLUTION INTRAMUSCULAR; INTRAVENOUS at 11:08

## 2018-08-15 RX ADMIN — GLYCOPYRROLATE 0.2 MG: 0.2 INJECTION INTRAMUSCULAR; INTRAVENOUS at 09:08

## 2018-08-15 RX ADMIN — MAGNESIUM SULFATE IN WATER 2 G: 40 INJECTION, SOLUTION INTRAVENOUS at 07:08

## 2018-08-15 RX ADMIN — GLYCOPYRROLATE 0.2 MG: 0.2 INJECTION INTRAMUSCULAR; INTRAVENOUS at 12:08

## 2018-08-15 NOTE — ANESTHESIA POSTPROCEDURE EVALUATION
"Anesthesia Post Evaluation    Patient: Ute Mcbride    Procedure(s) Performed: Procedure(s) (LRB):  ABLATION (N/A)  ECHOCARDIOGRAM,TRANSESOPHAGEAL (N/A)    Final Anesthesia Type: general  Patient location during evaluation: ICU  Patient participation: No - Unable to Participate, Sedation  Level of consciousness: sedated  Post-procedure vital signs: reviewed and stable  Pain management: adequate  Airway patency: patent  PONV status at discharge: No PONV  Anesthetic complications: yes  Perioperative Events: unplanned ICU admission  Soraida-operative Events Comments: Poor oxygenation during case resulting in case termination.  Thought to be related to volume overload and MR (intraoperative PEGGY revealed worsening valvular function compared to pre operative echo).  Cardiovascular status: hemodynamically stable  Respiratory status: ETT and ventilator  Hydration status: hypervolemic  Follow-up not needed.        Visit Vitals  BP (!) 187/103 (BP Location: Left arm, Patient Position: Lying)   Pulse 88   Temp 36.3 °C (97.3 °F) (Oral)   Resp 20   Ht 5' 7" (1.702 m)   Wt 79.8 kg (176 lb)   SpO2 97%   BMI 27.57 kg/m²       Pain/Rosario Score: Pain Assessment Performed: Yes (8/15/2018  6:00 AM)  Presence of Pain: denies (8/15/2018  6:00 AM)        "

## 2018-08-15 NOTE — PLAN OF CARE
Problem: Patient Care Overview  Goal: Plan of Care Review  Outcome: Ongoing (interventions implemented as appropriate)  Dr Villa notified of elevated b/p

## 2018-08-15 NOTE — ASSESSMENT & PLAN NOTE
- follow uric acid   - patient with diffuse disease  - would benefit from rheumatological consult for OP management

## 2018-08-15 NOTE — ASSESSMENT & PLAN NOTE
- patient developed acute hypoxemic respiratory failure during EP procedure for RF Ablation for Atrial Fibrillation  - per verbal report procedure was not entirely completed  - extubation not possible due to hypoxemia likely due to pulmonary edema in the setting of receipt of 2L of NS and medication non-adherence prior to procedure, patient not taking Lasix 40mg BID for the last month  - known to have NICM with EF 20-25%, JUDY, LA without thrombus, mod/severe MR, PFO, ePAP 62  - continue Lasix diuresis with IV pushes 60mg IV scheduled for 1630  - ABG with pH 7.39, CO2 33, HCO3 20, PO2 183  - follow CXR for ETT placement and evaluation of cardiopulmonary processes   - strict I's and O's  - pulm consulted for vent management and extubation

## 2018-08-15 NOTE — BRIEF OP NOTE
Brief OP Note    : Dr. Mario Lou     Pre-operative Diagnosis: Atrial Fibrillation     Procedure Performed: S/p AF Ablation    Access:  Bilateral femoral venous     Patient was intubated with general anesthesia. During case, patient became progressively further hypoxic on the ventilator. Joint discussion with anesthesia and EP to terminate the case secondary to hypoxia. Patient to be transferred to ICU intubated. Possibly need for aggressive IV diuresis or inotropic support for CHF.     PEGGY 8/15/18  CONCLUSIONS     1 - Severely depressed left ventricular systolic function (EF 20-25%).     2 - Biatrial enlargement.     3 - Left atrial appendage is bilobed with no visualized thrombus.     4 - Enlarged left ventricular enlargement.     5 - Shunt across atrial septum consistent with patent foramen ovale.     6 - Moderate to severe mitral regurgitation.     7 - Moderate tricuspid regurgitation.     8 - Trivial pulmonic regurgitation.       Patient is s/p AF ablation on 8/15/18. He will need the followin. Do not strain or lift anything greater than 5 lb for 1 week.   2. Do not drive or operate any dangerous machinery for 24 hours.   3. Keep the dressing on, clean, and dry for 24 hours.   4. After 24 hours, the dressing may be removed and a shower is allowed.   5. Clean the area with mild soap and water.   6. Once the skin has healed (1 week), bathing in a tub or swimming is allowed.   7. Inspect the groin site daily and report to the physician any signs of infection at the site: redness, pain, fever >100.4, unusual pain at the access site or affected extremity, unusual swelling at the access site, or any yellow, white or green drainage.  Call 911 if you have:   Bleeding from the puncture site that you cannot stop by doing the following:   Relax and lie down right away. Keep your leg flat and apply firm pressure to the site using your fingers and a gauze pad. Keep the pressure on for 10  minutes. Continue this until the bleeding stops. This may take awhile. When bleeding stops, cover the site with a sterile bandage and keep your leg still as much as possible.  8. Follow up with Dr. Mario Lou in 4-6 weeks.

## 2018-08-15 NOTE — HOSPITAL COURSE
Upon arrival to CCU patient was hypertensive on dopamine, dopamine stopped, epinephrine weaned off. Patient with SpO2 of 100% on 100%. ABG drawn and instructions given to respiratory to wean down on O2 with goal O2 sat of >92%. Patient started on Heparin, Lasix gtt and pressor support, which was able to be weaned down.  Patient diuresed significantly with Lasix pushes and lasix gtt. Transitioned off Lasix gtt, heparin discontinued, restarted on home a/c with warfarin. Antiarrhythmic with digoxin and amiodarone at home doses. Remains in atrial fibrillation. 2D echo performed with findings of LV systolic dysfunction EF 20-25%, mildly depressed RV systolic function, trivial pericardial effusion, mobile echodensity in LV likely loose chordal apparatus.

## 2018-08-15 NOTE — PLAN OF CARE
Problem: Infection, Risk/Actual (Adult)  Goal: Identify Related Risk Factors and Signs and Symptoms  Related risk factors and signs and symptoms are identified upon initiation of Human Response Clinical Practice Guideline (CPG)  Outcome: Ongoing (interventions implemented as appropriate)  Pt remains intubated and sedated. Pt currently on propofol, fentanyl, lasix, heparin, and dobutamine gtt. All VSS at this time. Pulses and perfusion remain the same, no bleeding to bilateral groin sites. Family updated on POC, not at bedside at this time however. No new orders currently, will continue to monitor pt closely.

## 2018-08-15 NOTE — TRANSFER OF CARE
"Anesthesia Transfer of Care Note    Patient: Ute Mcbride    Procedure(s) Performed: Procedure(s) (LRB):  ABLATION (N/A)  ECHOCARDIOGRAM,TRANSESOPHAGEAL (N/A)    Patient location: ICU    Anesthesia Type: epidural    Transport from OR: Transported from OR intubated on 100% O2 by AMBU with assisted ventilation. Continuous ECG monitoring in transport. Continuous SpO2 monitoring in transport. Continuos invasive BP monitoring in transport. Continuous CVP monitoring in transport. Continuous PA pressure monitoring in transport    Post vital signs: stable    Level of consciousness: sedated    Nausea/Vomiting: no nausea/vomiting    Complications: none    Transfer of care protocol was followed      Last vitals:   Visit Vitals  BP (!) 187/103 (BP Location: Left arm, Patient Position: Lying)   Pulse 88   Temp 36.3 °C (97.3 °F) (Oral)   Resp 20   Ht 5' 7" (1.702 m)   Wt 79.8 kg (176 lb)   SpO2 97%   BMI 27.57 kg/m²     "

## 2018-08-15 NOTE — H&P
[facility]  Cardiac Electrophysiology  History and Physical     Admission Date: 8/15/2018  Code Status: Prior   Attending Provider: Mario Lou MD   Principal Problem:AF (atrial fibrillation)    Subjective:     Chief Complaint:  dyspnea    HPI:   Mr. Mcbride is a 63 year old male with a PMH significant for CHF, anemia, persistent atrial fibrillation, alcohol abuse.     He has had AF since at least 2010. He has had impaired EF for many years with recent drop to 20-25% 2/18. Cardioversion with amiodarone has been used without maintenance of NSR. After his most recent CV 4/26/18 he has had improvement in symptoms. He has NYHA Class II symptoms. He is on warfarin for CVA prophylaxis.      Echo 2/18:     CONCLUSIONS     1 - Severely depressed left ventricular systolic function (EF 20-25%).     2 - Impaired LV relaxation, increased LVEDP.     3 - Severe left atrial enlargement.     4 - Eccentric hypertrophy.     5 - Right ventricular enlargement with moderately to severely depressed systolic function.     6 - Pulmonary hypertension. The estimated PA systolic pressure is greater than 62 mmHg.     7 - Mild mitral regurgitation.     8 - Moderate tricuspid regurgitation.     9 - Mild pulmonic regurgitation.      He has been taking 200mg Amiodarone twice a day. Ran out of lasix and Protonix about 1 month ago. Notes compliance with Coreg and Coumadin. Notes having dyspnea on exertion but denies orthopnea. Denies cough, fever, chills, lightheadedness, dizziness, dysuria or history of bleeding.       Past Medical History:   Diagnosis Date    Anticoagulant long-term use     but has been noncompliant    Arthritis     Atrial fibrillation, chronic     Cardiomyopathy, nonischemic 11/9/2014    CHF (congestive heart failure)     Coronary artery disease     Encounter for blood transfusion     Gout     Hypertension        Past Surgical History:   Procedure Laterality Date    ANKLE SURGERY       CATARACT EXTRACTION W/  INTRAOCULAR LENS IMPLANT Bilateral        Review of patient's allergies indicates:  No Known Allergies    No current facility-administered medications on file prior to encounter.      Current Outpatient Medications on File Prior to Encounter   Medication Sig    carvedilol (COREG) 6.25 MG tablet Take 1 tablet (6.25 mg total) by mouth 2 (two) times daily with meals.    digoxin (LANOXIN) 250 mcg tablet Take 1 tablet (250 mcg total) by mouth once daily.    furosemide (LASIX) 40 MG tablet Take 1 tablet (40 mg total) by mouth 2 (two) times daily.    potassium chloride SA (K-DUR,KLOR-CON, K-TAB) 20 MEQ tablet Take 20 mEq by mouth once daily. Takes two tablets by mouth once a day    warfarin (COUMADIN) 2 MG tablet Take 1/2 tablet (1mg) on Saturday and Sunday and 1 tablet (2mg) all other days as directed by coumadin clinic. Discontinue 5mg tablet. (Patient taking differently: Take 1 tablet on Tuesdays and 1/2 tablet on all other day by mouth every evening as directed by coumadin clinic.)    amiodarone (PACERONE) 200 MG Tab Take 1 tablet (200 mg total) by mouth 2 (two) times daily.    lisinopril 10 MG tablet Take 1 tablet (10 mg total) by mouth once daily.    pantoprazole (PROTONIX) 40 MG tablet Take 1 tablet (40 mg total) by mouth once daily.     Family History     Problem Relation (Age of Onset)    Hypertension Mother, Father    Stroke Mother        Tobacco Use    Smoking status: Never Smoker    Smokeless tobacco: Never Used   Substance and Sexual Activity    Alcohol use: Yes     Alcohol/week: 1.2 oz     Types: 2 Cans of beer per week    Drug use: No    Sexual activity: Not on file     Review of Systems   Constitution: Negative for chills and fever.   HENT: Negative for congestion.    Eyes: Negative for blurred vision.   Cardiovascular: Positive for dyspnea on exertion and leg swelling. Negative for chest pain, near-syncope, palpitations and syncope.   Respiratory: Positive for shortness  of breath. Negative for cough.    Endocrine: Negative for polyuria.   Skin: Negative for itching and rash.   Musculoskeletal: Negative for back pain.   Gastrointestinal: Negative for abdominal pain, constipation, diarrhea, nausea and vomiting.   Genitourinary: Negative for dysuria.   Neurological: Negative for dizziness, headaches and light-headedness.   Psychiatric/Behavioral: Negative for altered mental status.     Objective:     Vital Signs (Most Recent):  Temp: 97.3 °F (36.3 °C) (08/15/18 0557)  Pulse: 88 (08/15/18 0557)  Resp: 20 (08/15/18 0557)  BP: (!) 187/103 (08/15/18 0600)  SpO2: 97 % (08/15/18 0557) Vital Signs (24h Range):  Temp:  [97.3 °F (36.3 °C)] 97.3 °F (36.3 °C)  Pulse:  [88] 88  Resp:  [20] 20  SpO2:  [97 %] 97 %  BP: (186-187)/(103-111) 187/103       Weight: 79.8 kg (176 lb)  Body mass index is 27.57 kg/m².    SpO2: 97 %  O2 Device (Oxygen Therapy): room air    Physical Exam   Constitutional: He is oriented to person, place, and time. He appears well-developed and well-nourished.   HENT:   Head: Normocephalic and atraumatic.   Eyes: Conjunctivae are normal. Pupils are equal, round, and reactive to light.   Neck: Normal range of motion. Neck supple.   Cardiovascular: Normal rate, S1 normal, S2 normal and normal heart sounds. Exam reveals no gallop and no friction rub.   No murmur heard.  Pulses:       Radial pulses are 2+ on the right side, and 2+ on the left side.   Irregularly irregular   Pulmonary/Chest: Effort normal and breath sounds normal. No respiratory distress. He has no wheezes. He has no rales. He exhibits no tenderness.   Abdominal: Soft. Bowel sounds are normal. He exhibits no distension and no mass. There is no tenderness. There is no rebound and no guarding.   Musculoskeletal: He exhibits no edema.   Neurological: He is alert and oriented to person, place, and time.   Skin: Skin is warm and dry.   Psychiatric: He has a normal mood and affect.       Significant Labs:   CMP: No  results for input(s): NA, K, CL, CO2, GLU, BUN, CREATININE, CALCIUM, PROT, ALBUMIN, BILITOT, ALKPHOS, AST, ALT, ANIONGAP, ESTGFRAFRICA, EGFRNONAA in the last 48 hours., CBC: No results for input(s): WBC, HGB, HCT, PLT in the last 48 hours., INR:   Recent Labs   Lab  08/15/18   0531   INR  2.0*    and Lipid Panel No results for input(s): CHOL, HDL, LDLCALC, TRIG, CHOLHDL in the last 48 hours.    Significant Imaging: Echocardiogram:   2D echo with color flow doppler:   Results for orders placed or performed during the hospital encounter of 02/14/18   2D echo with color flow doppler   Result Value Ref Range    EF 20 (A) 55 - 65    Mitral Valve Regurgitation MILD     Diastolic Dysfunction Yes (A)     Est. PA Systolic Pressure 62.09 (A)     Mitral Valve Mobility NORMAL     Tricuspid Valve Regurgitation MODERATE (A)     and EKG: atrial fibrillation     Assessment and Plan:     Active Diagnoses:    Diagnosis Date Noted POA    PRINCIPAL PROBLEM:  AF (atrial fibrillation) [I48.91] 08/15/2018 Yes      Problems Resolved During this Admission:     - Presents today for AF RFA PVI. PEGGY prior to procedure.     Extensive discussion regarding risks, benefits, and alternative approaches to the procedure was had with the patient.  The patient voices understanding and all questions have been answered. The patient would like to proceed as planned.    I discussed AF and its basic pathophysiology, including its health implications and treatment options with the patient. Specifically, I addressed the need for CVA prophylaxis as well as the goal to reduce symptomatic arrhythmic episodes by pharmacologic and/or procedural methods.    I had extensive discussion with patient regarding risks and benefits of PVI/WACA, and he would like to proceed. Risks of procedure include (but are not limited to) bleeding, perforation requiring emergency draining or surgery, AV block, death, AV fistula, AE fistula, PV stenosis, vascular injury, cardiac  perforation, conduction system damage leading to pacemaker implantation, MI,stroke.    Michael Villa MD  Cardiac Electrophysiology  [facility]

## 2018-08-15 NOTE — PROGRESS NOTES
Patient received with 7.5 ETT secured at 24 cm at lip. Placed on documented settings. ambu at bedside. Will cont. To monitor patient.

## 2018-08-15 NOTE — H&P
[facility]  Cardiology  History and Physical     Patient Name: Ute Mcbride  MRN: [id number]  Admission Date: 8/15/2018  Code Status: Prior   Attending Provider: Edd Evans MD   Primary Care Physician: Kenneth Redding MD  Principal Problem:Acute hypoxemic respiratory failure    Patient information was obtained from past medical records.     Subjective:     Chief Complaint:  Respiratory failure     HPI:  Mr. Ute Mcbride is a 63 year old  male who presented to have RF Ablation for atrial fibrillation with the EP team today. He is known to have NICM EF 20-25%, AFib on Warfarin, HTN, tobacco and etoh abuse. Following administration of general anesthesia patient developed hypoxic respiratory failure. Per chart review patient ran out of Lasix (which he was prescribed 40mg BID) and was only taking Warfarin, Amiodarone and Coreg as prescribed. Patient given Lasix 40mg, started on Dopamine and Epinephrine, and transferred to ICU for further evaluation and treatment. Upon arrival to CCU patient was hypertensive on dopamine, dopamine stopped, epinephrine weaned off. Patient with SpO2 of 100% on 100%. ABG drawn and instructions given to respiratory to wean down on O2 with goal O2 sat of >92%    Past Medical History:   Diagnosis Date    Anticoagulant long-term use     but has been noncompliant    Arthritis     Atrial fibrillation, chronic     Cardiomyopathy, nonischemic 11/9/2014    CHF (congestive heart failure)     Coronary artery disease     Encounter for blood transfusion     Gout     Hypertension        Past Surgical History:   Procedure Laterality Date    ANKLE SURGERY      CATARACT EXTRACTION W/  INTRAOCULAR LENS IMPLANT Bilateral        Review of patient's allergies indicates:  No Known Allergies    No current facility-administered medications on file prior to encounter.      Current Outpatient Medications on File Prior to Encounter   Medication Sig    amiodarone  (PACERONE) 200 MG Tab Take 1 tablet (200 mg total) by mouth 2 (two) times daily.    carvedilol (COREG) 6.25 MG tablet Take 1 tablet (6.25 mg total) by mouth 2 (two) times daily with meals.    digoxin (LANOXIN) 250 mcg tablet Take 1 tablet (250 mcg total) by mouth once daily.    lisinopril 10 MG tablet Take 1 tablet (10 mg total) by mouth once daily.    potassium chloride SA (K-DUR,KLOR-CON, K-TAB) 20 MEQ tablet Take 20 mEq by mouth once daily. Takes two tablets by mouth once a day    warfarin (COUMADIN) 2 MG tablet Take 1/2 tablet (1mg) on Saturday and Sunday and 1 tablet (2mg) all other days as directed by coumadin clinic. Discontinue 5mg tablet. (Patient taking differently: Take 1 tablet on Tuesdays and 1/2 tablet on all other day by mouth every evening as directed by coumadin clinic.)    furosemide (LASIX) 40 MG tablet Take 1 tablet (40 mg total) by mouth 2 (two) times daily.    pantoprazole (PROTONIX) 40 MG tablet Take 1 tablet (40 mg total) by mouth once daily.     Family History     Problem Relation (Age of Onset)    Hypertension Mother, Father    Stroke Mother        Tobacco Use    Smoking status: Never Smoker    Smokeless tobacco: Never Used   Substance and Sexual Activity    Alcohol use: Yes     Alcohol/week: 1.2 oz     Types: 2 Cans of beer per week    Drug use: No    Sexual activity: Not on file     Review of Systems   Unable to perform ROS: intubated     Objective:     Vital Signs (Most Recent):  Temp: 97.3 °F (36.3 °C) (08/15/18 0557)  Pulse: 88 (08/15/18 0557)  Resp: 20 (08/15/18 0557)  BP: (!) 187/103 (08/15/18 0600)  SpO2: 97 % (08/15/18 0557) Vital Signs (24h Range):  Temp:  [97.3 °F (36.3 °C)] 97.3 °F (36.3 °C)  Pulse:  [88] 88  Resp:  [20] 20  SpO2:  [97 %] 97 %  BP: (186-187)/(103-111) 187/103     Weight: 79.8 kg (176 lb)  Body mass index is 27.57 kg/m².    SpO2: 97 %  O2 Device (Oxygen Therapy): room air      Intake/Output Summary (Last 24 hours) at 8/15/2018 0200  Last data filed at  8/15/2018 1200  Gross per 24 hour   Intake 900 ml   Output 2385 ml   Net -1485 ml       Lines/Drains/Airways     Drain                 Urethral Catheter 08/15/18 0828 Straight-tip 16 Fr. less than 1 day          Airway                 Airway - Non-Surgical 08/15/18 0819 Endotracheal Tube less than 1 day          Arterial Line                 Arterial Line 08/15/18 0842 Right Radial less than 1 day          Peripheral Intravenous Line                 Peripheral IV - Single Lumen 08/15/18 0609 Right Wrist less than 1 day         Peripheral IV - Single Lumen 08/15/18 0610 Left Forearm less than 1 day                Physical Exam   Constitutional: He appears well-developed and well-nourished. He is intubated.   Eyes: Pupils are equal, round, and reactive to light.   Cardiovascular: Normal rate, regular rhythm, S1 normal and S2 normal. Exam reveals gallop and S3.   Pulses:       Radial pulses are 2+ on the right side, and 2+ on the left side.        Dorsalis pedis pulses are 2+ on the right side, and 2+ on the left side.   Systolic murmur loudest at 5th ICS MCL    Pulmonary/Chest: Effort normal. He is intubated. He has rales.   Abdominal: Soft. Bowel sounds are normal.   Musculoskeletal: He exhibits no edema.   Skin: Skin is warm and dry.   Diffuse gouty tophi   Psychiatric: He has a normal mood and affect.   Nursing note and vitals reviewed.      Significant Labs:   Recent Results (from the past 24 hour(s))   Type & Screen    Collection Time: 08/15/18  5:31 AM   Result Value Ref Range    Group & Rh A POS     Indirect Odalis NEG    APTT    Collection Time: 08/15/18  5:31 AM   Result Value Ref Range    aPTT 25.2 21.0 - 32.0 sec   Protime-INR    Collection Time: 08/15/18  5:31 AM   Result Value Ref Range    Prothrombin Time 19.8 (H) 9.0 - 12.5 sec    INR 2.0 (H) 0.8 - 1.2   Transesophageal echo    Collection Time: 08/15/18  8:00 AM   Result Value Ref Range    EF 20 (A) 55 - 65    Mitral Valve Regurgitation MODERATE TO  SEVERE (A)     Mitral Valve Mobility NORMAL     Tricuspid Valve Regurgitation MODERATE (A)    ISTAT PROCEDURE    Collection Time: 08/15/18  1:59 PM   Result Value Ref Range    POC PH 7.393 7.35 - 7.45    POC PCO2 33.8 (L) 35 - 45 mmHg    POC PO2 183 (H) 80 - 100 mmHg    POC HCO3 20.6 (L) 24 - 28 mmol/L    POC BE -4 -2 to 2 mmol/L    POC SATURATED O2 100 95 - 100 %    POC TCO2 22 (L) 23 - 27 mmol/L    Rate 16     Sample ARTERIAL     Site Annmarie/UAC     Allens Test N/A     DelSys Adult Vent     Mode AC/PRVC     Vt 480     PEEP 10    POCT glucose    Collection Time: 08/15/18  1:59 PM   Result Value Ref Range    POCT Glucose 178 (H) 70 - 110 mg/dL           Assessment and Plan:     * Acute hypoxemic respiratory failure    - patient developed acute hypoxemic respiratory failure during EP procedure for RF Ablation for Atrial Fibrillation  - per verbal report procedure was not entirely completed  - extubation not possible due to hypoxemia likely due to pulmonary edema in the setting of receipt of 2L of NS and medication non-adherence prior to procedure, patient not taking Lasix 40mg BID for the last month  - known to have NICM with EF 20-25%, JUDY, LA without thrombus, mod/severe MR, PFO, ePAP 62  - continue Lasix diuresis with IV pushes 60mg IV scheduled for 1630  - ABG with pH 7.39, CO2 33, HCO3 20, PO2 183  - follow CXR for ETT placement and evaluation of cardiopulmonary processes   - strict I's and O's  - pulm consulted for vent management and extubation          Multiple gouty tophi    - follow uric acid   - patient with diffuse disease  - would benefit from rheumatological consult for OP management         AF (atrial fibrillation)    - continue heparin gtt and amiodarone for now  - appreciate EP final recommendations         Acute on chronic systolic HF (heart failure)    - patient with known NICM EF 20-25 percent, known to be NYHA Class II prior to presentation to hospital  - patient on Lisinopril, Coreg, Digoxin,  Amio, Warfarin, Protonix at home  - continue digoxin, amio and transition to heparin gtt for now as patient did not complete the EP procedure  - restart Lisinopril, Coreg when HF exacerbation/volume overload resolved  - follow BNP, CBC, CMP, troponin, uric acid            VTE Risk Mitigation (From admission, onward)        Ordered     heparin 25,000 units in dextrose 5% 250 mL (100 units/mL) infusion LOW INTENSITY nomogram - [facility]  Continuous      08/15/18 1425     heparin 25,000 units in dextrose 5% (100 units/ml) IV bolus from bag INITIAL BOLUS  Once      08/15/18 1426     heparin 25,000 units in dextrose 5% (100 units/ml) IV bolus from bag - ADDITIONAL PRN BOLUS - 60 units/kg  As needed (PRN)      08/15/18 1427          Gael Sharma MD  Cardiology   [facility]

## 2018-08-15 NOTE — CONSULTS
TRANSESOPHAGEAL ECHOCARDIOGRAPHY   PRE-PROCEDURE NOTE    08/15/2018    HPI:     Ute Mcbride is a 63 y.o. man with PMHx of persistent AF (CHADS-VASC2 score of 3 on warfarin), NICM 20-25%, HTN, CAD, Gout here for outpatient PVI for his AF. He has had AF since 2010 and was recently DCCV'd with PEGGY in April. He was placed on Amiodarone afterwards. He went back into afib afterwards. EF has been 20-25% since 2014. Had thrombus noted on his PEGGY in 03/22/18, reported non-compliance to warfarin. Platelets are 140. In both PEGGY's intubation was achieved w/o difficulty.       Had an EGD in 2014 which showed reflux esophagitis.     Dysphagia or odynophagia:  No  Liver Disease, esophageal disease, or known varices:  No  Upper GI Bleeding: No  Snoring:  No  Sleep Apnea:  No  Prior neck surgery or radiation:  No  History of anesthetic difficulties:  No  Family history of anesthetic difficulties:  No  Last oral intake:  12 hours ago  Able to move neck in all directions:  Yes      Meds:     Scheduled Meds:  PRN Meds:  Continuous Infusions:   sodium chloride 0.9%         Physical Exam:     Vitals:  Temp:  [97.3 °F (36.3 °C)]   Pulse:  [88]   Resp:  [20]   BP: (186-187)/(103-111)   SpO2:  [97 %]        Constitutional: NAD, conversant  HEENT:   Sclera anicteric, Uvula midline, EOMI, OP clear  Neck:               No JVD, moves to all direction without any limitations  CV:                Irregularly irregular, no murmurs / rubs / gallops, normal S1/S2  Pulm:               CTAB, no wheezes, rales, or ronchi  GI:               Abdomen soft, NTND, +BS  Extremities:              No LE edema, warm with palpable pulses, joints inflamed on hands and 1st big toe.  Neuro:   AAOX3, no focal motor deficits    Mallampati: 3  ASA: 3      Labs:     Recent Results (from the past 336 hour(s))   CBC auto differential    Collection Time: 08/09/18 10:51 AM   Result Value Ref Range    WBC 4.32 3.90 - 12.70 K/uL    Hemoglobin 16.0 14.0 - 18.0 g/dL     Hematocrit 50.1 40.0 - 54.0 %    Platelets 140 (L) 150 - 350 K/uL       Recent Results (from the past 336 hour(s))   Basic metabolic panel    Collection Time: 18 10:51 AM   Result Value Ref Range    Sodium 139 136 - 145 mmol/L    Potassium 4.1 3.5 - 5.1 mmol/L    Chloride 105 95 - 110 mmol/L    CO2 24 23 - 29 mmol/L    BUN, Bld 12 8 - 23 mg/dL    Creatinine 0.9 0.5 - 1.4 mg/dL    Calcium 9.2 8.7 - 10.5 mg/dL    Anion Gap 10 8 - 16 mmol/L       Estimated Creatinine Clearance: 85.1 mL/min (based on SCr of 0.9 mg/dL).    INR: 2.0     Imaging:  PEGGY 18:    1 - Left atrial appendage is single lobed with no visualized thrombus.     2 - Biatrial enlargement.     3 - Severely depressed left ventricular systolic function (EF 20-25%).     4 - Indeterminate LV diastolic function.     5 - Shunt across atrial septum small (left to right) consistent with patent foramen ovale.     6 - Moderate mitral regurgitation.     7 - Mild to moderate tricuspid regurgitation.     8 - Grade 1 atheroma disease of aorta.     EK/15/2018  Afib with RBBB          Assessment & Plan:     PLAN:  1. PEGGY for evaluation of LA/VERONICA r/o thrombus prior to PVI    -The risks, benefits & alternatives of the procedure were explained to the patient.    -The risks of transesophageal echo include but are not limited to:  Dental trauma, esophageal trauma/perforation, bleeding, laryngospasm/brochospasm, aspiration, sore throat/hoarseness, & dislodgement of the endotracheal tube/nasogastric tube (where applicable).    -The risks of moderate sedation include hypotension, respiratory depression, arrhythmias, bronchospasm, & death.    -Informed consent was obtained & the patient is agreeable to proceed with the procedure.    I will discuss with the attending physician. Attending addendum is to follow.     Further recommendations per attending addendum    Ernie Cruz MD, PGY-5  Cardiology Fellow

## 2018-08-15 NOTE — HOSPITAL COURSE
Patient presented in atrial fibrillation for AF RFA PVI on 8/15/18. He tolerated the procedure well, no complications. Monitored overnight. Discharged home in stable condition. Follow up with Dr. Mario Lou in 4-6 weeks.

## 2018-08-15 NOTE — SUBJECTIVE & OBJECTIVE
Past Medical History:   Diagnosis Date    Anticoagulant long-term use     but has been noncompliant    Arthritis     Atrial fibrillation, chronic     Cardiomyopathy, nonischemic 11/9/2014    CHF (congestive heart failure)     Coronary artery disease     Encounter for blood transfusion     Gout     Hypertension        Past Surgical History:   Procedure Laterality Date    ANKLE SURGERY      CATARACT EXTRACTION W/  INTRAOCULAR LENS IMPLANT Bilateral        Review of patient's allergies indicates:  No Known Allergies    No current facility-administered medications on file prior to encounter.      Current Outpatient Medications on File Prior to Encounter   Medication Sig    amiodarone (PACERONE) 200 MG Tab Take 1 tablet (200 mg total) by mouth 2 (two) times daily.    carvedilol (COREG) 6.25 MG tablet Take 1 tablet (6.25 mg total) by mouth 2 (two) times daily with meals.    digoxin (LANOXIN) 250 mcg tablet Take 1 tablet (250 mcg total) by mouth once daily.    lisinopril 10 MG tablet Take 1 tablet (10 mg total) by mouth once daily.    potassium chloride SA (K-DUR,KLOR-CON, K-TAB) 20 MEQ tablet Take 20 mEq by mouth once daily. Takes two tablets by mouth once a day    warfarin (COUMADIN) 2 MG tablet Take 1/2 tablet (1mg) on Saturday and Sunday and 1 tablet (2mg) all other days as directed by coumadin clinic. Discontinue 5mg tablet. (Patient taking differently: Take 1 tablet on Tuesdays and 1/2 tablet on all other day by mouth every evening as directed by coumadin clinic.)    furosemide (LASIX) 40 MG tablet Take 1 tablet (40 mg total) by mouth 2 (two) times daily.    pantoprazole (PROTONIX) 40 MG tablet Take 1 tablet (40 mg total) by mouth once daily.     Family History     Problem Relation (Age of Onset)    Hypertension Mother, Father    Stroke Mother        Tobacco Use    Smoking status: Never Smoker    Smokeless tobacco: Never Used   Substance and Sexual Activity    Alcohol use: Yes     Alcohol/week:  1.2 oz     Types: 2 Cans of beer per week    Drug use: No    Sexual activity: Not on file     Review of Systems   Unable to perform ROS: intubated     Objective:     Vital Signs (Most Recent):  Temp: 97.3 °F (36.3 °C) (08/15/18 0557)  Pulse: 88 (08/15/18 0557)  Resp: 20 (08/15/18 0557)  BP: (!) 187/103 (08/15/18 0600)  SpO2: 97 % (08/15/18 0557) Vital Signs (24h Range):  Temp:  [97.3 °F (36.3 °C)] 97.3 °F (36.3 °C)  Pulse:  [88] 88  Resp:  [20] 20  SpO2:  [97 %] 97 %  BP: (186-187)/(103-111) 187/103     Weight: 79.8 kg (176 lb)  Body mass index is 27.57 kg/m².    SpO2: 97 %  O2 Device (Oxygen Therapy): room air      Intake/Output Summary (Last 24 hours) at 8/15/2018 1243  Last data filed at 8/15/2018 1200  Gross per 24 hour   Intake 900 ml   Output 2385 ml   Net -1485 ml       Lines/Drains/Airways     Drain                 Urethral Catheter 08/15/18 0828 Straight-tip 16 Fr. less than 1 day          Airway                 Airway - Non-Surgical 08/15/18 0819 Endotracheal Tube less than 1 day          Arterial Line                 Arterial Line 08/15/18 0842 Right Radial less than 1 day          Peripheral Intravenous Line                 Peripheral IV - Single Lumen 08/15/18 0609 Right Wrist less than 1 day         Peripheral IV - Single Lumen 08/15/18 0610 Left Forearm less than 1 day                Physical Exam   Constitutional: He appears well-developed and well-nourished. He is intubated.   Eyes: Pupils are equal, round, and reactive to light.   Cardiovascular: Normal rate, regular rhythm, S1 normal and S2 normal. Exam reveals gallop and S3.   Pulses:       Radial pulses are 2+ on the right side, and 2+ on the left side.        Dorsalis pedis pulses are 2+ on the right side, and 2+ on the left side.   Systolic murmur loudest at 5th ICS MCL    Pulmonary/Chest: Effort normal. He is intubated. He has rales.   Abdominal: Soft. Bowel sounds are normal.   Musculoskeletal: He exhibits no edema.   Skin: Skin is warm  and dry.   Diffuse gouty tophi   Psychiatric: He has a normal mood and affect.   Nursing note and vitals reviewed.      Significant Labs:   Recent Results (from the past 24 hour(s))   Type & Screen    Collection Time: 08/15/18  5:31 AM   Result Value Ref Range    Group & Rh A POS     Indirect Odalis NEG    APTT    Collection Time: 08/15/18  5:31 AM   Result Value Ref Range    aPTT 25.2 21.0 - 32.0 sec   Protime-INR    Collection Time: 08/15/18  5:31 AM   Result Value Ref Range    Prothrombin Time 19.8 (H) 9.0 - 12.5 sec    INR 2.0 (H) 0.8 - 1.2   Transesophageal echo    Collection Time: 08/15/18  8:00 AM   Result Value Ref Range    EF 20 (A) 55 - 65    Mitral Valve Regurgitation MODERATE TO SEVERE (A)     Mitral Valve Mobility NORMAL     Tricuspid Valve Regurgitation MODERATE (A)    ISTAT PROCEDURE    Collection Time: 08/15/18  1:59 PM   Result Value Ref Range    POC PH 7.393 7.35 - 7.45    POC PCO2 33.8 (L) 35 - 45 mmHg    POC PO2 183 (H) 80 - 100 mmHg    POC HCO3 20.6 (L) 24 - 28 mmol/L    POC BE -4 -2 to 2 mmol/L    POC SATURATED O2 100 95 - 100 %    POC TCO2 22 (L) 23 - 27 mmol/L    Rate 16     Sample ARTERIAL     Site Annmarie/UAC     Allens Test N/A     DelSys Adult Vent     Mode AC/PRVC     Vt 480     PEEP 10    POCT glucose    Collection Time: 08/15/18  1:59 PM   Result Value Ref Range    POCT Glucose 178 (H) 70 - 110 mg/dL

## 2018-08-15 NOTE — ASSESSMENT & PLAN NOTE
- patient with known NICM EF 20-25 percent, known to be NYHA Class II prior to presentation to hospital  - patient on Lisinopril, Coreg, Digoxin, Amio, Warfarin, Protonix at home  - continue digoxin, amio and transition to heparin gtt for now as patient did not complete the EP procedure  - restart Lisinopril, Coreg when HF exacerbation/volume overload resolved  - follow BNP, CBC, CMP, troponin, uric acid

## 2018-08-15 NOTE — ANESTHESIA PROCEDURE NOTES
Arterial    Diagnosis: atrial fibrillation  Doctor requesting consult: Carmine    Patient location during procedure: done in OR  Procedure start time: 8/15/2018 8:42 AM  Timeout: 8/15/2018 8:42 AM  Procedure end time: 8/15/2018 8:44 AM  Staffing  Anesthesiologist: Phoenix Gordon MD  Performed: anesthesiologist   Anesthesiologist was present at the time of the procedure.  Preanesthetic Checklist  Completed: patient identified, site marked, surgical consent, pre-op evaluation, timeout performed, IV checked, risks and benefits discussed, monitors and equipment checked and anesthesia consent givenArterial  Skin Prep: chlorhexidine gluconate  Orientation: right  Location: radial  Catheter Size: 20 G  Catheter placement by Anatomical landmarks. Heme positive aspiration all ports.Insertion Attempts: 1  Assessment  Dressing: secured with tape and tegaderm  Patient: Tolerated well

## 2018-08-15 NOTE — HPI
Mr. Mcbride is a 63 year old male with a PMH significant for CHF, anemia, persistent atrial fibrillation, alcohol abuse.      He has had AF since at least 2010. He has had impaired EF for many years with recent drop to 20-25% 2/18. Cardioversion with amiodarone has been used without maintenance of NSR. After his most recent CV 4/26/18 he has had improvement in symptoms. He has NYHA Class II symptoms. He is on warfarin for CVA prophylaxis.      Echo 2/18:     CONCLUSIONS     1 - Severely depressed left ventricular systolic function (EF 20-25%).     2 - Impaired LV relaxation, increased LVEDP.     3 - Severe left atrial enlargement.     4 - Eccentric hypertrophy.     5 - Right ventricular enlargement with moderately to severely depressed systolic function.     6 - Pulmonary hypertension. The estimated PA systolic pressure is greater than 62 mmHg.     7 - Mild mitral regurgitation.     8 - Moderate tricuspid regurgitation.     9 - Mild pulmonic regurgitation.      He has been taking 200mg Amiodarone twice a day. Ran out of lasix and Protonix about 1 month ago. Notes compliance with Coreg and Coumadin. Notes having dyspnea on exertion but denies orthopnea. Denies cough, fever, chills, lightheadedness, dizziness, dysuria or history of bleeding.

## 2018-08-15 NOTE — HPI
Mr. Ute Mcbride is a 63 year old  male who presented to have RF Ablation for atrial fibrillation with the EP team today. He is known to have NICM EF 20-25%, AFib on Warfarin, HTN, tobacco and etoh abuse. Following administration of general anesthesia patient developed hypoxic respiratory failure. Per chart review patient ran out of Lasix (which he was prescribed 40mg BID) and was only taking Warfarin, Amiodarone and Coreg as prescribed. Patient given Lasix 40mg, started on Dopamine and Epinephrine, and transferred to ICU for further evaluation and treatment. Upon arrival to CCU patient was hypertensive on dopamine, dopamine stopped, epinephrine weaned off. Patient with SpO2 of 100% on 100%. ABG drawn and instructions given to respiratory to wean down on O2 with goal O2 sat of >92%

## 2018-08-15 NOTE — ANESTHESIA PREPROCEDURE EVALUATION
08/15/2018  Ute Mcbride is a 63 y.o., male presenting for afib ablation.    Past Medical History:   Diagnosis Date    Anticoagulant long-term use     but has been noncompliant    Arthritis     Atrial fibrillation, chronic     Cardiomyopathy, nonischemic 11/9/2014    CHF (congestive heart failure)     Coronary artery disease     Encounter for blood transfusion     Gout     Hypertension      Past Surgical History:   Procedure Laterality Date    ANKLE SURGERY      CATARACT EXTRACTION W/  INTRAOCULAR LENS IMPLANT Bilateral      Review of patient's allergies indicates:  No Known Allergies  No current facility-administered medications on file prior to encounter.      Current Outpatient Medications on File Prior to Encounter   Medication Sig Dispense Refill    amiodarone (PACERONE) 200 MG Tab Take 1 tablet (200 mg total) by mouth 2 (two) times daily. 120 tablet 3    carvedilol (COREG) 6.25 MG tablet Take 1 tablet (6.25 mg total) by mouth 2 (two) times daily with meals. 60 tablet 3    digoxin (LANOXIN) 250 mcg tablet Take 1 tablet (250 mcg total) by mouth once daily. 90 tablet 3    lisinopril 10 MG tablet Take 1 tablet (10 mg total) by mouth once daily. 90 tablet 3    potassium chloride SA (K-DUR,KLOR-CON, K-TAB) 20 MEQ tablet Take 20 mEq by mouth once daily. Takes two tablets by mouth once a day      warfarin (COUMADIN) 2 MG tablet Take 1/2 tablet (1mg) on Saturday and Sunday and 1 tablet (2mg) all other days as directed by coumadin clinic. Discontinue 5mg tablet. (Patient taking differently: Take 1 tablet on Tuesdays and 1/2 tablet on all other day by mouth every evening as directed by coumadin clinic.) 30 tablet 3    furosemide (LASIX) 40 MG tablet Take 1 tablet (40 mg total) by mouth 2 (two) times daily. 30 tablet 0    pantoprazole (PROTONIX) 40 MG tablet Take 1 tablet (40 mg total) by  mouth once daily. 90 tablet 0     Lab Results   Component Value Date    WBC 4.32 08/09/2018    HGB 16.0 08/09/2018    HCT 50.1 08/09/2018     (H) 08/09/2018     (L) 08/09/2018     BMP  Lab Results   Component Value Date     08/09/2018    K 4.1 08/09/2018     08/09/2018    CO2 24 08/09/2018    BUN 12 08/09/2018    CREATININE 0.9 08/09/2018    CALCIUM 9.2 08/09/2018    ANIONGAP 10 08/09/2018    ESTGFRAFRICA >60.0 08/09/2018    EGFRNONAA >60.0 08/09/2018     PEGGY 04/26/2018:  CONCLUSIONS     1 - Left atrial appendage is single lobed with no visualized thrombus.     2 - Biatrial enlargement.     3 - Severely depressed left ventricular systolic function (EF 20-25%).     4 - Indeterminate LV diastolic function.     5 - Shunt across atrial septum consistent with patent foramen ovale.     6 - Moderate mitral regurgitation.     7 - Mild to moderate tricuspid regurgitation.     8 - Grade 1 atheroma disease of aorta.     Anesthesia Evaluation    I have reviewed the Patient Summary Reports.     I have reviewed the Medications.     Review of Systems  Anesthesia Hx:  No problems with previous Anesthesia   Denies Personal Hx of Anesthesia complications.   Cardiovascular:   Exercise tolerance: poor Hypertension CAD  Dysrhythmias atrial fibrillation CHF    Pulmonary:  Pulmonary Normal    Renal/:  Renal/ Normal     Hepatic/GI:  Hepatic/GI Normal    Musculoskeletal:   Arthritis  gout   Neurological:   Denies CVA. Denies Seizures.        Physical Exam  General:  Obesity    Airway/Jaw/Neck:  Airway Findings: Mouth Opening: Normal Tongue: Normal  General Airway Assessment: Adult  Mallampati: II  TM Distance: Normal, at least 6 cm  Jaw/Neck Findings:  Neck ROM: Normal ROM      Dental:  Dental Findings: Periodontal disease, Severe         Mental Status:  Mental Status Findings:  Cooperative, Alert and Oriented         Anesthesia Plan  Type of Anesthesia, risks & benefits discussed:  Anesthesia Type:   general  Patient's Preference:   Intra-op Monitoring Plan: standard ASA monitors and arterial line  Intra-op Monitoring Plan Comments:   Post Op Pain Control Plan: multimodal analgesia, per primary service following discharge from PACU and IV/PO Opioids PRN  Post Op Pain Control Plan Comments:   Induction:   IV  Beta Blocker:  Patient is on a Beta-Blocker and has received one dose within the past 24 hours (No further documentation required).       Informed Consent: Patient understands risks and agrees with Anesthesia plan.  Questions answered. Anesthesia consent signed with patient.  ASA Score: 4     Day of Surgery Review of History & Physical:    H&P update referred to the surgeon.     Anesthesia Plan Notes: Vasopressin for hypotension        Ready For Surgery From Anesthesia Perspective.

## 2018-08-15 NOTE — CONSULTS
Consulted for vent management in patient who had acute hypoxemic respiratory failure david-procedure for RF ablation of A fib. Presently sat 91 on 50% Fio2 and 10 PEEP. Tidal volume 480 cc. He is breathing 20, vent set at 16. Air entry is clear and equal bilaterally. Primary team is diuresing aggressively and providing inotropic support given hx of NiCM and suspicion of acute volume overload. Will leave full consult note in AM.    I adjusted TV to reflect 6 ml/kg IBW - now at 400 cc. His rate adjusted to compensate.    Wean Fio2 and PEEP to maintain sat > 92-93    Agree with fentanyl drip and low dose propofol to help with sedation.    Agree with avoiding dexmatomadine in patient with bradycardia.    Will follow up with you in the AM and assist with extubation when he is ready to be weaned.     Vineet Roper MD  [facility]  Pager: [phone]  Cell: [phone]

## 2018-08-16 LAB
ALBUMIN SERPL BCP-MCNC: 3.2 G/DL
ALLENS TEST: ABNORMAL
ALP SERPL-CCNC: 97 U/L
ALT SERPL W/O P-5'-P-CCNC: 17 U/L
ANION GAP SERPL CALC-SCNC: 13 MMOL/L
APTT BLDCRRT: 32.3 SEC
APTT BLDCRRT: 35.3 SEC
APTT BLDCRRT: 37.2 SEC
APTT BLDCRRT: 41.9 SEC
APTT BLDCRRT: 97 SEC
AST SERPL-CCNC: 41 U/L
BASOPHILS # BLD AUTO: 0.05 K/UL
BASOPHILS NFR BLD: 0.8 %
BILIRUB SERPL-MCNC: 1.9 MG/DL
BUN SERPL-MCNC: 8 MG/DL
CALCIUM SERPL-MCNC: 8.4 MG/DL
CHLORIDE SERPL-SCNC: 102 MMOL/L
CO2 SERPL-SCNC: 24 MMOL/L
CREAT SERPL-MCNC: 0.7 MG/DL
DELSYS: ABNORMAL
DIFFERENTIAL METHOD: ABNORMAL
EOSINOPHIL # BLD AUTO: 0.1 K/UL
EOSINOPHIL NFR BLD: 1.5 %
ERYTHROCYTE [DISTWIDTH] IN BLOOD BY AUTOMATED COUNT: 14.9 %
ERYTHROCYTE [SEDIMENTATION RATE] IN BLOOD BY WESTERGREN METHOD: 16 MM/H
EST. GFR  (AFRICAN AMERICAN): >60 ML/MIN/1.73 M^2
EST. GFR  (NON AFRICAN AMERICAN): >60 ML/MIN/1.73 M^2
FIO2: 60
GLUCOSE SERPL-MCNC: 120 MG/DL
HCO3 UR-SCNC: 30.1 MMOL/L (ref 24–28)
HCT VFR BLD AUTO: 44.1 %
HGB BLD-MCNC: 14.4 G/DL
IMM GRANULOCYTES # BLD AUTO: 0.03 K/UL
IMM GRANULOCYTES NFR BLD AUTO: 0.5 %
INR PPP: 2.1
LYMPHOCYTES # BLD AUTO: 1.2 K/UL
LYMPHOCYTES NFR BLD: 18.3 %
MCH RBC QN AUTO: 31.3 PG
MCHC RBC AUTO-ENTMCNC: 32.7 G/DL
MCV RBC AUTO: 96 FL
MIN VOL: 7.51
MODE: ABNORMAL
MONOCYTES # BLD AUTO: 0.5 K/UL
MONOCYTES NFR BLD: 7.8 %
NEUTROPHILS # BLD AUTO: 4.7 K/UL
NEUTROPHILS NFR BLD: 71.1 %
NRBC BLD-RTO: 0 /100 WBC
PCO2 BLDA: 40.2 MMHG (ref 35–45)
PEEP: 10
PH SMN: 7.48 [PH] (ref 7.35–7.45)
PIP: 30
PLATELET # BLD AUTO: 119 K/UL
PMV BLD AUTO: 10.1 FL
PO2 BLDA: 256 MMHG (ref 80–100)
POC BE: 7 MMOL/L
POC SATURATED O2: 100 % (ref 95–100)
POC TCO2: 31 MMOL/L (ref 23–27)
POCT GLUCOSE: 117 MG/DL (ref 70–110)
POCT GLUCOSE: 146 MG/DL (ref 70–110)
POTASSIUM SERPL-SCNC: 3.2 MMOL/L
PROT SERPL-MCNC: 7 G/DL
PROTHROMBIN TIME: 20.3 SEC
RBC # BLD AUTO: 4.6 M/UL
SAMPLE: ABNORMAL
SITE: ABNORMAL
SODIUM SERPL-SCNC: 139 MMOL/L
SP02: 100
TROPONIN I SERPL DL<=0.01 NG/ML-MCNC: 1.08 NG/ML
VT: 400
WBC # BLD AUTO: 6.57 K/UL

## 2018-08-16 PROCEDURE — 84484 ASSAY OF TROPONIN QUANT: CPT

## 2018-08-16 PROCEDURE — 85610 PROTHROMBIN TIME: CPT

## 2018-08-16 PROCEDURE — 25000003 PHARM REV CODE 250: Performed by: STUDENT IN AN ORGANIZED HEALTH CARE EDUCATION/TRAINING PROGRAM

## 2018-08-16 PROCEDURE — 85025 COMPLETE CBC W/AUTO DIFF WBC: CPT

## 2018-08-16 PROCEDURE — 85730 THROMBOPLASTIN TIME PARTIAL: CPT | Mod: 91

## 2018-08-16 PROCEDURE — 37799 UNLISTED PX VASCULAR SURGERY: CPT

## 2018-08-16 PROCEDURE — 94010 BREATHING CAPACITY TEST: CPT

## 2018-08-16 PROCEDURE — 85730 THROMBOPLASTIN TIME PARTIAL: CPT

## 2018-08-16 PROCEDURE — 99900035 HC TECH TIME PER 15 MIN (STAT)

## 2018-08-16 PROCEDURE — 99233 SBSQ HOSP IP/OBS HIGH 50: CPT | Mod: GC,,, | Performed by: INTERNAL MEDICINE

## 2018-08-16 PROCEDURE — 99291 CRITICAL CARE FIRST HOUR: CPT | Mod: GC,,, | Performed by: INTERNAL MEDICINE

## 2018-08-16 PROCEDURE — 63600175 PHARM REV CODE 636 W HCPCS: Performed by: STUDENT IN AN ORGANIZED HEALTH CARE EDUCATION/TRAINING PROGRAM

## 2018-08-16 PROCEDURE — 80053 COMPREHEN METABOLIC PANEL: CPT

## 2018-08-16 PROCEDURE — 99900026 HC AIRWAY MAINTENANCE (STAT)

## 2018-08-16 PROCEDURE — 94761 N-INVAS EAR/PLS OXIMETRY MLT: CPT

## 2018-08-16 PROCEDURE — 99900017 HC EXTUBATION W/PARAMETERS (STAT)

## 2018-08-16 PROCEDURE — 20000000 HC ICU ROOM

## 2018-08-16 PROCEDURE — 94150 VITAL CAPACITY TEST: CPT

## 2018-08-16 PROCEDURE — 63600175 PHARM REV CODE 636 W HCPCS: Performed by: INTERNAL MEDICINE

## 2018-08-16 PROCEDURE — 93010 ELECTROCARDIOGRAM REPORT: CPT | Mod: ,,, | Performed by: INTERNAL MEDICINE

## 2018-08-16 PROCEDURE — 82803 BLOOD GASES ANY COMBINATION: CPT

## 2018-08-16 PROCEDURE — C9113 INJ PANTOPRAZOLE SODIUM, VIA: HCPCS | Performed by: STUDENT IN AN ORGANIZED HEALTH CARE EDUCATION/TRAINING PROGRAM

## 2018-08-16 PROCEDURE — 27000221 HC OXYGEN, UP TO 24 HOURS

## 2018-08-16 RX ORDER — WARFARIN 2 MG/1
2 TABLET ORAL DAILY
Status: DISCONTINUED | OUTPATIENT
Start: 2018-08-16 | End: 2018-08-17 | Stop reason: HOSPADM

## 2018-08-16 RX ORDER — POTASSIUM CHLORIDE 7.46 G/1000ML
10 INJECTION, SOLUTION INTRAVENOUS
Status: COMPLETED | OUTPATIENT
Start: 2018-08-16 | End: 2018-08-16

## 2018-08-16 RX ADMIN — POTASSIUM CHLORIDE 10 MEQ: 149 INJECTION, SOLUTION, CONCENTRATE INTRAVENOUS at 09:08

## 2018-08-16 RX ADMIN — PROPOFOL 20 MCG/KG/MIN: 10 INJECTION, EMULSION INTRAVENOUS at 04:08

## 2018-08-16 RX ADMIN — HEPARIN SODIUM AND DEXTROSE 30 UNITS/KG/HR: 10000; 5 INJECTION INTRAVENOUS at 12:08

## 2018-08-16 RX ADMIN — WARFARIN SODIUM 2 MG: 2 TABLET ORAL at 06:08

## 2018-08-16 RX ADMIN — HEPARIN SODIUM AND DEXTROSE 14 UNITS/KG/HR: 10000; 5 INJECTION INTRAVENOUS at 04:08

## 2018-08-16 RX ADMIN — HEPARIN SODIUM AND DEXTROSE 30 UNITS/KG/HR: 10000; 5 INJECTION INTRAVENOUS at 07:08

## 2018-08-16 RX ADMIN — POTASSIUM CHLORIDE 10 MEQ: 149 INJECTION, SOLUTION, CONCENTRATE INTRAVENOUS at 08:08

## 2018-08-16 RX ADMIN — POTASSIUM CHLORIDE 10 MEQ: 149 INJECTION, SOLUTION, CONCENTRATE INTRAVENOUS at 11:08

## 2018-08-16 RX ADMIN — POTASSIUM CHLORIDE 10 MEQ: 149 INJECTION, SOLUTION, CONCENTRATE INTRAVENOUS at 10:08

## 2018-08-16 RX ADMIN — DEXTROSE 40 MG: 50 INJECTION, SOLUTION INTRAVENOUS at 08:08

## 2018-08-16 NOTE — SUBJECTIVE & OBJECTIVE
Interval History:   Overnight with excellent diuresis -5L on Lasix 5mg ggt. On dobutamine 5mg, SBP 120s, MAPs >70s.  Patient awake and nods to questions appropriately     Telemetry: Sinus bradycardia with frequent PACs, no other telemetry events      Review of Systems   Unable to perform ROS: intubated     Objective:     Vital Signs (Most Recent):  Temp: 98.2 °F (36.8 °C) (08/16/18 0702)  Pulse: (!) 59 (08/16/18 0935)  Resp: 17 (08/16/18 0935)  BP: (!) 103/56 (08/16/18 0900)  SpO2: 100 % (08/16/18 0935) Vital Signs (24h Range):  Temp:  [97.8 °F (36.6 °C)-99.2 °F (37.3 °C)] 98.2 °F (36.8 °C)  Pulse:  [52-66] 59  Resp:  [16-37] 17  SpO2:  [90 %-100 %] 100 %  BP: (100-159)/(56-90) 103/56  Arterial Line BP: ()/(49-98) 110/56     Weight: 82.7 kg (182 lb 5.1 oz)  Body mass index is 28.56 kg/m².     SpO2: 100 %  O2 Device (Oxygen Therapy): ventilator    Physical Exam   Constitutional: He appears well-developed and well-nourished. He appears ill. He is sedated and intubated.   HENT:   Head: Normocephalic.   Eyes: Pupils are equal, round, and reactive to light.   Cardiovascular: Normal rate, regular rhythm, normal heart sounds and intact distal pulses. Exam reveals no gallop and no friction rub.   No murmur heard.  Pulmonary/Chest: Effort normal. He is intubated. No respiratory distress. He has no wheezes. He has rales. He exhibits no tenderness.   Abdominal: Soft. Bowel sounds are normal. He exhibits no distension. There is no tenderness.   Musculoskeletal: Normal range of motion. He exhibits edema.   Skin: Skin is warm and dry. No erythema. No pallor.   Nursing note and vitals reviewed.      Significant Labs:   Blood Culture: No results for input(s): LABBLOO in the last 48 hours., BMP:   Recent Labs   Lab  08/15/18   1335  08/16/18   0356   GLU  192*  120*   NA  139  139   K  3.9  3.2*   CL  107  102   CO2  20*  24   BUN  10  8   CREATININE  0.8  0.7   CALCIUM  8.3*  8.4*   MG  1.4*   --    , CBC:   Recent Labs   Lab   08/15/18   1335  08/16/18   0356   WBC  8.38  6.57   HGB  15.4  14.4   HCT  47.7  44.1   PLT  154  119*   , INR:   Recent Labs   Lab  08/15/18   0531  08/16/18   0356   INR  2.0*  2.1*   , Lipid Panel No results for input(s): CHOL, HDL, LDLCALC, TRIG, CHOLHDL in the last 48 hours., Troponin   Recent Labs   Lab  08/15/18   1335  08/16/18   0813   TROPONINI  0.837*  1.079*    and All pertinent lab results from the last 24 hours have been reviewed.    Significant Imaging: Echocardiogram:   2D echo with color flow doppler:   Results for orders placed or performed during the hospital encounter of 02/14/18   2D echo with color flow doppler   Result Value Ref Range    EF 20 (A) 55 - 65    Mitral Valve Regurgitation MILD     Diastolic Dysfunction Yes (A)     Est. PA Systolic Pressure 62.09 (A)     Mitral Valve Mobility NORMAL     Tricuspid Valve Regurgitation MODERATE (A)

## 2018-08-16 NOTE — ASSESSMENT & PLAN NOTE
-IV diuresis leading to net 5L loss.   -Much improved since yesterday  -Defer management to Cardiology team

## 2018-08-16 NOTE — CONSULTS
[facility]  Pulmonology  Consult Note    Patient Name: Ute Mcbride  MRN: [id number]  Admission Date: 8/15/2018  Hospital Length of Stay: 1 days  Code Status: Full Code  Attending Physician: Edd Evans MD  Primary Care Provider: Kenneth Redding MD   Principal Problem: Acute hypoxemic respiratory failure    Consults  Subjective:     HPI:  Mr. Ute Mcbride is a 63 year old  male who presented to have RF Ablation for atrial fibrillation with the EP team today. He is known to have NICM EF 20-25%, AFib on Warfarin, HTN, tobacco and etoh abuse. During EP procedure, pt became hypoxic respiratory failure. Pt was previously prescribed lasix but had not taken for some time.  Patient given Lasix 40mg, started on Dopamine and Epinephrine, and transferred to ICU for further evaluation and treatment. Upon arrival to CCU patient was hypertensive on dopamine, dopamine stopped, epinephrine weaned off. Patient with SpO2 of 100% on 100%.         Past Medical History:   Diagnosis Date    Anticoagulant long-term use     but has been noncompliant    Arthritis     Atrial fibrillation, chronic     Cardiomyopathy, nonischemic 11/9/2014    CHF (congestive heart failure)     Coronary artery disease     Encounter for blood transfusion     Gout     Hypertension        Past Surgical History:   Procedure Laterality Date    ANKLE SURGERY      CATARACT EXTRACTION W/  INTRAOCULAR LENS IMPLANT Bilateral        Review of patient's allergies indicates:  No Known Allergies    Family History     Problem Relation (Age of Onset)    Hypertension Mother, Father    Stroke Mother        Tobacco Use    Smoking status: Never Smoker    Smokeless tobacco: Never Used   Substance and Sexual Activity    Alcohol use: Yes     Alcohol/week: 1.2 oz     Types: 2 Cans of beer per week    Drug use: No    Sexual activity: Not on file         Review of Systems   Unable to perform ROS: Intubated     Objective:      Vital Signs (Most Recent):  Temp: 98.2 °F (36.8 °C) (08/16/18 0702)  Pulse: (!) 59 (08/16/18 0935)  Resp: 17 (08/16/18 0935)  BP: (!) 103/56 (08/16/18 0900)  SpO2: 100 % (08/16/18 0935) Vital Signs (24h Range):  Temp:  [97.8 °F (36.6 °C)-99.2 °F (37.3 °C)] 98.2 °F (36.8 °C)  Pulse:  [52-66] 59  Resp:  [16-37] 17  SpO2:  [90 %-100 %] 100 %  BP: (100-159)/(56-90) 103/56  Arterial Line BP: ()/(49-98) 110/56     Weight: 82.7 kg (182 lb 5.1 oz)  Body mass index is 28.56 kg/m².      Intake/Output Summary (Last 24 hours) at 8/16/2018 1038  Last data filed at 8/16/2018 0830  Gross per 24 hour   Intake 1461.69 ml   Output 7340 ml   Net -5878.31 ml       Physical Exam   Constitutional: He appears well-developed and well-nourished. No distress. He is intubated.   Cardiovascular: Normal rate, regular rhythm and normal heart sounds.   Pulses:       Radial pulses are 1+ on the right side, and 1+ on the left side.        Posterior tibial pulses are 1+ on the right side, and 1+ on the left side.   Pulmonary/Chest: Effort normal. He is intubated. He has rales.   Musculoskeletal: He exhibits no edema.   Neurological: He is alert.   Responds to commands appropriately   Skin: Skin is warm and dry. He is not diaphoretic.       Vents:  Vent Mode: A/C (08/16/18 0935)  Ventilator Initiated: Yes (08/15/18 1343)  Set Rate: 16 bmp (08/16/18 0935)  Vt Set: 400 mL (08/16/18 0935)  PEEP/CPAP: 8 cmH20 (08/16/18 0935)  Oxygen Concentration (%): 51 (08/16/18 0935)  Peak Airway Pressure: 26 cmH2O (08/16/18 0935)  Plateau Pressure: 19 cmH20 (08/16/18 0935)  Total Ve: 6.87 mL (08/16/18 0935)  F/VT Ratio<105 (RSBI): (!) 41.98 (08/16/18 0935)    Lines/Drains/Airways     Drain                 Urethral Catheter 08/15/18 0828 Straight-tip 16 Fr. 1 day          Airway                 Airway - Non-Surgical 08/15/18 0819 Endotracheal Tube 1 day          Arterial Line                 Arterial Line 08/15/18 0842 Right Radial 1 day          Peripheral  Intravenous Line                 Peripheral IV - Single Lumen 08/15/18 0609 Right Wrist 1 day         Peripheral IV - Single Lumen 08/15/18 0610 Left Forearm 1 day         Peripheral IV - Single Lumen 08/15/18 1500 Left Hand less than 1 day         Peripheral IV - Single Lumen 08/15/18 1505 Right Forearm less than 1 day         Peripheral IV - Single Lumen 08/15/18 1730 Right Antecubital less than 1 day                Significant Labs:    CBC/Anemia Profile:  Recent Labs   Lab  08/15/18   1335  08/16/18   0356   WBC  8.38  6.57   HGB  15.4  14.4   HCT  47.7  44.1   PLT  154  119*   MCV  97  96   RDW  14.8*  14.9*        Chemistries:  Recent Labs   Lab  08/15/18   1335  08/16/18   0356   NA  139  139   K  3.9  3.2*   CL  107  102   CO2  20*  24   BUN  10  8   CREATININE  0.8  0.7   CALCIUM  8.3*  8.4*   ALBUMIN  3.4*  3.2*   PROT  7.2  7.0   BILITOT  1.9*  1.9*   ALKPHOS  109  97   ALT  17  17   AST  31  41*   MG  1.4*   --    PHOS  3.8   --          Significant Imaging:   CXR: I have reviewed all pertinent results/findings within the past 24 hours and my personal findings are:  concern for pulmonary interstitial edema    Assessment/Plan:     * Acute hypoxemic respiratory failure    -Secondary to interstitial pulmonary edema in setting of decompensated CHF secondary to inadequate outpatient diuresis.   -Good IV diuresis overnight.  -EP planning to follow up in 4-6 weeks  -Pt passed SBT, extubation today        Acute on chronic systolic HF (heart failure)    -IV diuresis leading to net 5L loss.   -Much improved since yesterday  -Defer management to Cardiology team              Thank you for your consult. I will follow-up with patient. Please contact us if you have any additional questions.     Bennett Guajardo,   Pulmonology  [facility]

## 2018-08-16 NOTE — HPI
Mr. Ute Mcbride is a 63 year old  male who presented to have RF Ablation for atrial fibrillation with the EP team today. He is known to have NICM EF 20-25%, AFib on Warfarin, HTN, tobacco and etoh abuse. During EP procedure, pt became hypoxic respiratory failure. Pt was previously prescribed lasix but had not taken for some time.  Patient given Lasix 40mg, started on Dopamine and Epinephrine, and transferred to ICU for further evaluation and treatment. Upon arrival to CCU patient was hypertensive on dopamine, dopamine stopped, epinephrine weaned off. Patient with SpO2 of 100% on 100%.

## 2018-08-16 NOTE — ASSESSMENT & PLAN NOTE
- patient with known NICM EF 20-25 percent, known to be NYHA Class II prior to presentation to hospital  - patient on Lisinopril, Coreg, Digoxin, Amio, Warfarin, Protonix at home  - continue digoxin, amio and transition to heparin gtt for now as patient did not complete the EP procedure  - restart Lisinopril, Coreg when HF exacerbation/volume overload resolved  - follow BNP, CBC, CMP, troponin, uric acid  - uric acid grossly elevated at 9.2, patient would benefit from Colchicine therapy and then XOI to assist with reduction of tophaceous gout burden - lifestyle modifications would also assist, this would also be beneficial in the overall treatment of his DCM

## 2018-08-16 NOTE — ASSESSMENT & PLAN NOTE
- continue heparin gtt and amiodarone for now  - appreciate EP final recommendations - not for further intevention during this stay, resume home AC

## 2018-08-16 NOTE — PLAN OF CARE
Problem: Patient Care Overview  Goal: Plan of Care Review  Outcome: Ongoing (interventions implemented as appropriate)    Neuro: Arouses to voice. Patient follows all commands. Denies any numbness or tingling. Pupils are equal and reactive to light.       Pulmonary: RA. Breath sounds are equal and clear bilaterally with no signs of distress.      Cardiovascular: MAP> 65. Emanuel to SR. Need for Norepinephrine as BP dropped.      Gastrointestinal: NPO.      Genitourinary: Jo in place. UOP 3 L     Endocrine: Q6hr. No SSI     Electrolytes/Blood: Potassium 3.2 - Dr. Abraham pruett.     Integumentary/Other: None     Infusions: Norepinephrine, dobutamine, heparin, lasix, fentanyl, propofol     Restraints: Bilateral soft wrist restraints continue to be applied. Restraint orders will  Thursday, 2018 @ 1347.     Bed in low, locked position, call light within reach, side rails up x3. No bath given. Will continue to monitor.

## 2018-08-16 NOTE — PROGRESS NOTES
[facility]  Cardiology  Progress Note    Patient Name: Ute Mcbride  MRN: [id number]  Admission Date: 8/15/2018  Hospital Length of Stay: 1 days  Code Status: Full Code   Attending Physician: Edd Evans MD   Primary Care Physician: Kenneth Redding MD  Expected Discharge Date: 8/20/2018  Principal Problem:Acute hypoxemic respiratory failure    Subjective:     Hospital Course:   Upon arrival to CCU patient was hypertensive on dopamine, dopamine stopped, epinephrine weaned off. Patient with SpO2 of 100% on 100%. ABG drawn and instructions given to respiratory to wean down on O2 with goal O2 sat of >92%. Patient started on Heparin, Lasix gtt and pressor support, which was able to be weaned down.  Patient diuresed significantly with Lasix pushes and lasix gtt.    Review of Systems   Unable to perform ROS: intubated     Objective:     Vital Signs (Most Recent):  Temp: 98.2 °F (36.8 °C) (08/16/18 0702)  Pulse: 66 (08/16/18 0730)  Resp: 16 (08/16/18 0730)  BP: 113/60 (08/16/18 0702)  SpO2: 99 % (08/16/18 0730) Vital Signs (24h Range):  Temp:  [97.8 °F (36.6 °C)-99.2 °F (37.3 °C)] 98.2 °F (36.8 °C)  Pulse:  [52-66] 66  Resp:  [16-37] 16  SpO2:  [90 %-100 %] 99 %  BP: (100-159)/(59-90) 113/60  Arterial Line BP: ()/(49-98) 110/56     Weight: 82.7 kg (182 lb 5.1 oz)  Body mass index is 28.56 kg/m².    SpO2: 99 %  O2 Device (Oxygen Therapy): ventilator      Intake/Output Summary (Last 24 hours) at 8/16/2018 0734  Last data filed at 8/16/2018 0600  Gross per 24 hour   Intake 1501.71 ml   Output 7165 ml   Net -5663.29 ml       Lines/Drains/Airways     Drain                 Urethral Catheter 08/15/18 0828 Straight-tip 16 Fr. less than 1 day          Airway                 Airway - Non-Surgical 08/15/18 0819 Endotracheal Tube less than 1 day          Arterial Line                 Arterial Line 08/15/18 0842 Right Radial less than 1 day          Peripheral Intravenous Line                  Peripheral IV - Single Lumen 08/15/18 0609 Right Wrist 1 day         Peripheral IV - Single Lumen 08/15/18 0610 Left Forearm 1 day         Peripheral IV - Single Lumen 08/15/18 1500 Left Hand less than 1 day         Peripheral IV - Single Lumen 08/15/18 1505 Right Forearm less than 1 day         Peripheral IV - Single Lumen 08/15/18 1730 Right Antecubital less than 1 day                Physical Exam   Constitutional: He appears well-developed and well-nourished. He is intubated.   Eyes: Pupils are equal, round, and reactive to light.   Cardiovascular: Normal rate, regular rhythm, S1 normal and S2 normal. Exam reveals gallop and S3.   Pulses:       Radial pulses are 2+ on the right side, and 2+ on the left side.        Dorsalis pedis pulses are 2+ on the right side, and 2+ on the left side.   Systolic murmur loudest at 5th ICS MCL    Pulmonary/Chest: Effort normal. He is intubated. He has rales.   Abdominal: Soft. Bowel sounds are normal.   Musculoskeletal: He exhibits no edema.   Skin: Skin is warm and dry.   Diffuse gouty tophi   Psychiatric: He has a normal mood and affect.   Nursing note and vitals reviewed.      Significant Labs:   Recent Results (from the past 24 hour(s))   Transesophageal echo    Collection Time: 08/15/18  8:00 AM   Result Value Ref Range    EF 20 (A) 55 - 65    Mitral Valve Regurgitation MODERATE TO SEVERE (A)     Mitral Valve Mobility NORMAL     Tricuspid Valve Regurgitation MODERATE (A)    CBC auto differential    Collection Time: 08/15/18  1:35 PM   Result Value Ref Range    WBC 8.38 3.90 - 12.70 K/uL    RBC 4.90 4.60 - 6.20 M/uL    Hemoglobin 15.4 14.0 - 18.0 g/dL    Hematocrit 47.7 40.0 - 54.0 %    MCV 97 82 - 98 fL    MCH 31.4 (H) 27.0 - 31.0 pg    MCHC 32.3 32.0 - 36.0 g/dL    RDW 14.8 (H) 11.5 - 14.5 %    Platelets 154 150 - 350 K/uL    MPV 10.5 9.2 - 12.9 fL    Immature Granulocytes 1.4 (H) 0.0 - 0.5 %    Gran # (ANC) 5.4 1.8 - 7.7 K/uL    Immature Grans (Abs) 0.12 (H) 0.00 - 0.04  K/uL    Lymph # 2.1 1.0 - 4.8 K/uL    Mono # 0.6 0.3 - 1.0 K/uL    Eos # 0.1 0.0 - 0.5 K/uL    Baso # 0.10 0.00 - 0.20 K/uL    nRBC 0 0 /100 WBC    Gran% 64.4 38.0 - 73.0 %    Lymph% 24.9 18.0 - 48.0 %    Mono% 7.0 4.0 - 15.0 %    Eosinophil% 1.1 0.0 - 8.0 %    Basophil% 1.2 0.0 - 1.9 %    Differential Method Automated    Comprehensive metabolic panel    Collection Time: 08/15/18  1:35 PM   Result Value Ref Range    Sodium 139 136 - 145 mmol/L    Potassium 3.9 3.5 - 5.1 mmol/L    Chloride 107 95 - 110 mmol/L    CO2 20 (L) 23 - 29 mmol/L    Glucose 192 (H) 70 - 110 mg/dL    BUN, Bld 10 8 - 23 mg/dL    Creatinine 0.8 0.5 - 1.4 mg/dL    Calcium 8.3 (L) 8.7 - 10.5 mg/dL    Total Protein 7.2 6.0 - 8.4 g/dL    Albumin 3.4 (L) 3.5 - 5.2 g/dL    Total Bilirubin 1.9 (H) 0.1 - 1.0 mg/dL    Alkaline Phosphatase 109 55 - 135 U/L    AST 31 10 - 40 U/L    ALT 17 10 - 44 U/L    Anion Gap 12 8 - 16 mmol/L    eGFR if African American >60.0 >60 mL/min/1.73 m^2    eGFR if non African American >60.0 >60 mL/min/1.73 m^2   Troponin I    Collection Time: 08/15/18  1:35 PM   Result Value Ref Range    Troponin I 0.837 (H) 0.000 - 0.026 ng/mL   Brain natriuretic peptide    Collection Time: 08/15/18  1:35 PM   Result Value Ref Range     (H) 0 - 99 pg/mL   Magnesium    Collection Time: 08/15/18  1:35 PM   Result Value Ref Range    Magnesium 1.4 (L) 1.6 - 2.6 mg/dL   Phosphorus    Collection Time: 08/15/18  1:35 PM   Result Value Ref Range    Phosphorus 3.8 2.7 - 4.5 mg/dL   Uric acid    Collection Time: 08/15/18  1:41 PM   Result Value Ref Range    Uric Acid 9.8 (H) 3.4 - 7.0 mg/dL   ISTAT PROCEDURE    Collection Time: 08/15/18  1:59 PM   Result Value Ref Range    POC PH 7.393 7.35 - 7.45    POC PCO2 33.8 (L) 35 - 45 mmHg    POC PO2 183 (H) 80 - 100 mmHg    POC HCO3 20.6 (L) 24 - 28 mmol/L    POC BE -4 -2 to 2 mmol/L    POC SATURATED O2 100 95 - 100 %    POC TCO2 22 (L) 23 - 27 mmol/L    Rate 16     Sample ARTERIAL     Site [facility]      Allens Test N/A     DelSys Adult Vent     Mode AC/PRVC     Vt 480     PEEP 10    POCT glucose    Collection Time: 08/15/18  1:59 PM   Result Value Ref Range    POCT Glucose 178 (H) 70 - 110 mg/dL   APTT    Collection Time: 08/15/18  2:25 PM   Result Value Ref Range    aPTT 25.8 21.0 - 32.0 sec   POCT glucose    Collection Time: 08/15/18 11:18 PM   Result Value Ref Range    POCT Glucose 99 70 - 110 mg/dL   APTT    Collection Time: 08/15/18 11:20 PM   Result Value Ref Range    aPTT 32.3 (H) 21.0 - 32.0 sec   ISTAT PROCEDURE    Collection Time: 08/16/18  3:53 AM   Result Value Ref Range    POC PH 7.482 (H) 7.35 - 7.45    POC PCO2 40.2 35 - 45 mmHg    POC PO2 256 (H) 80 - 100 mmHg    POC HCO3 30.1 (H) 24 - 28 mmol/L    POC BE 7 -2 to 2 mmol/L    POC SATURATED O2 100 95 - 100 %    POC TCO2 31 (H) 23 - 27 mmol/L    Rate 16     Sample ARTERIAL     Site [place]     Allens Test N/A     DelSys Adult Vent     Mode AC/PRVC     Vt 400     PEEP 10     PiP 30     FiO2 60     Min Vol 7.51     Sp02 100    CBC auto differential    Collection Time: 08/16/18  3:56 AM   Result Value Ref Range    WBC 6.57 3.90 - 12.70 K/uL    RBC 4.60 4.60 - 6.20 M/uL    Hemoglobin 14.4 14.0 - 18.0 g/dL    Hematocrit 44.1 40.0 - 54.0 %    MCV 96 82 - 98 fL    MCH 31.3 (H) 27.0 - 31.0 pg    MCHC 32.7 32.0 - 36.0 g/dL    RDW 14.9 (H) 11.5 - 14.5 %    Platelets 119 (L) 150 - 350 K/uL    MPV 10.1 9.2 - 12.9 fL    Immature Granulocytes 0.5 0.0 - 0.5 %    Gran # (ANC) 4.7 1.8 - 7.7 K/uL    Immature Grans (Abs) 0.03 0.00 - 0.04 K/uL    Lymph # 1.2 1.0 - 4.8 K/uL    Mono # 0.5 0.3 - 1.0 K/uL    Eos # 0.1 0.0 - 0.5 K/uL    Baso # 0.05 0.00 - 0.20 K/uL    nRBC 0 0 /100 WBC    Gran% 71.1 38.0 - 73.0 %    Lymph% 18.3 18.0 - 48.0 %    Mono% 7.8 4.0 - 15.0 %    Eosinophil% 1.5 0.0 - 8.0 %    Basophil% 0.8 0.0 - 1.9 %    Differential Method Automated    Comprehensive metabolic panel    Collection Time: 08/16/18  3:56 AM   Result Value Ref Range    Sodium 139 136 -  145 mmol/L    Potassium 3.2 (L) 3.5 - 5.1 mmol/L    Chloride 102 95 - 110 mmol/L    CO2 24 23 - 29 mmol/L    Glucose 120 (H) 70 - 110 mg/dL    BUN, Bld 8 8 - 23 mg/dL    Creatinine 0.7 0.5 - 1.4 mg/dL    Calcium 8.4 (L) 8.7 - 10.5 mg/dL    Total Protein 7.0 6.0 - 8.4 g/dL    Albumin 3.2 (L) 3.5 - 5.2 g/dL    Total Bilirubin 1.9 (H) 0.1 - 1.0 mg/dL    Alkaline Phosphatase 97 55 - 135 U/L    AST 41 (H) 10 - 40 U/L    ALT 17 10 - 44 U/L    Anion Gap 13 8 - 16 mmol/L    eGFR if African American >60.0 >60 mL/min/1.73 m^2    eGFR if non African American >60.0 >60 mL/min/1.73 m^2   APTT    Collection Time: 08/16/18  3:56 AM   Result Value Ref Range    aPTT 41.9 (H) 21.0 - 32.0 sec   Protime-INR    Collection Time: 08/16/18  3:56 AM   Result Value Ref Range    Prothrombin Time 20.3 (H) 9.0 - 12.5 sec    INR 2.1 (H) 0.8 - 1.2   APTT    Collection Time: 08/16/18  6:31 AM   Result Value Ref Range    aPTT 37.2 (H) 21.0 - 32.0 sec   POCT glucose    Collection Time: 08/16/18  6:33 AM   Result Value Ref Range    POCT Glucose 117 (H) 70 - 110 mg/dL           Assessment and Plan:     Brief HPI: Mr. Ute Mcbride is a 63 year old  male who presented to have RF Ablation for atrial fibrillation with the EP team today. He is known to have NICM EF 20-25%, AFib on Warfarin, HTN, tobacco and etoh abuse. Following administration of general anesthesia patient developed hypoxic respiratory failure. Per chart review patient ran out of Lasix (which he was prescribed 40mg BID) and was only taking Warfarin, Amiodarone and Coreg as prescribed. Patient given Lasix 40mg and then started on Lasix gtt. Diuresed well and planned for extubation 8/16.       * Acute hypoxemic respiratory failure    - patient developed acute hypoxemic respiratory failure during EP procedure for RF Ablation for Atrial Fibrillation  - per verbal report procedure was not entirely completed  - extubation not possible due to hypoxemia likely due to pulmonary edema in  the setting of receipt of 2L of NS and medication non-adherence prior to procedure, patient not taking Lasix 40mg BID for the last month, BNP >750  - known to have NICM with EF 20-25%, JUDY, LA without thrombus, mod/severe MR, PFO, ePAP 62  - patient net negative >5L since admission to CCU - lasix gtt off  - ABG with pH 7.39, CO2 33, HCO3 20, PO2 183 - repeat with evidence of contraction alkalosis  - CXR with evidence of pulmonary edema   - strict I's and O's  - pulm consulted for vent management and extubation           Acute on chronic systolic HF (heart failure)    - patient with known NICM EF 20-25 percent, known to be NYHA Class II prior to presentation to hospital  - patient on Lisinopril, Coreg, Digoxin, Amio, Warfarin, Protonix at home  - continue digoxin, amio and transition to heparin gtt for now as patient did not complete the EP procedure  - restart Lisinopril, Coreg when HF exacerbation/volume overload resolved  - follow BNP, CBC, CMP, troponin, uric acid  - uric acid grossly elevated at 9.2, patient would benefit from Colchicine therapy and then XOI to assist with reduction of tophaceous gout burden - lifestyle modifications would also assist, this would also be beneficial in the overall treatment of his DCM        Paroxysmal atrial fibrillation    - continue heparin gtt and amiodarone for now  - appreciate EP final recommendations - not for further intevention during this stay, resume home AC        Multiple gouty tophi    - follow uric acid   - patient with diffuse disease, as above would benefit from Colchicine and from XO inhibition to reduce burden of tophaceous disease  - alcohol cessation would benefit both DCM and gout   - would benefit from rheumatological consult for OP management         Cardiomyopathy, nonischemic    - see acute on chronic systolic heart failure         Elevated troponin    - likely demand ischemia in the setting of pulmonary edema   - trend for peak             VTE Risk  Mitigation (From admission, onward)        Ordered     warfarin (COUMADIN) tablet 2 mg  Daily      08/16/18 1110     heparin 25,000 units in dextrose 5% 250 mL (100 units/mL) infusion LOW INTENSITY nomogram - [facility]  Continuous      08/15/18 1425     heparin 25,000 units in dextrose 5% (100 units/ml) IV bolus from bag - ADDITIONAL PRN BOLUS - 30 units/kg  As needed (PRN)      08/15/18 1428     heparin 25,000 units in dextrose 5% (100 units/ml) IV bolus from bag - ADDITIONAL PRN BOLUS - 60 units/kg  As needed (PRN)      08/15/18 1427          Gael Sharma MD  Cardiology  [facility]

## 2018-08-16 NOTE — ASSESSMENT & PLAN NOTE
- follow uric acid   - patient with diffuse disease, as above would benefit from Colchicine and from XO inhibition to reduce burden of tophaceous disease  - alcohol cessation would benefit both DCM and gout   - would benefit from rheumatological consult for OP management

## 2018-08-16 NOTE — PROGRESS NOTES
08/16/18 1235   Negative Inspiratory Force   $ Negative Inspiratory Force Charges Given   Negative Inspiratory Force (cm H2O) -50   Vital Capacity   $ Vital Capacity Charges Given   Vital Capacity (mL) 820   Effort (Vital Capacity) good   Pt. extubated to 3NC per order; tolerating well; will continue to monitor.

## 2018-08-16 NOTE — ASSESSMENT & PLAN NOTE
Mr. Mcbride is a 63 yoM, patient of Dr. Lou with hx of Afib on Warfarin. Presented for elective RFA PVI 8/15/18, patient intubated, unfortunately procedure terminated due to poor oxygenation in the setting of severe MR NICM (20%) and patient ran out of Lasix 1 week prior to procedure. Acute hypoxemic respiratory failure d/t AECHF.   - Incomplete PVI with left sided isolation and partial ablation of the right sided veins    - EP service will continue to follow, please call for any questions or concerns. Discussed with Dr. Lou, no plans for RFA PVI while inpatient, he will follow up in clinic 4 - 6 weeks.   - Okay to extubate if patient ready from primary team  - Wean off dobutamine / Levophed if possible  - Continue aggressive diuresis   - Can start bridge Heparin to Coumadin from EP standpoint  - Resume digoxin and Amiodarone

## 2018-08-16 NOTE — SUBJECTIVE & OBJECTIVE
Review of Systems   Unable to perform ROS: intubated     Objective:     Vital Signs (Most Recent):  Temp: 98.2 °F (36.8 °C) (08/16/18 0702)  Pulse: 66 (08/16/18 0730)  Resp: 16 (08/16/18 0730)  BP: 113/60 (08/16/18 0702)  SpO2: 99 % (08/16/18 0730) Vital Signs (24h Range):  Temp:  [97.8 °F (36.6 °C)-99.2 °F (37.3 °C)] 98.2 °F (36.8 °C)  Pulse:  [52-66] 66  Resp:  [16-37] 16  SpO2:  [90 %-100 %] 99 %  BP: (100-159)/(59-90) 113/60  Arterial Line BP: ()/(49-98) 110/56     Weight: 82.7 kg (182 lb 5.1 oz)  Body mass index is 28.56 kg/m².    SpO2: 99 %  O2 Device (Oxygen Therapy): ventilator      Intake/Output Summary (Last 24 hours) at 8/16/2018 0734  Last data filed at 8/16/2018 0600  Gross per 24 hour   Intake 1501.71 ml   Output 7165 ml   Net -5663.29 ml       Lines/Drains/Airways     Drain                 Urethral Catheter 08/15/18 0828 Straight-tip 16 Fr. less than 1 day          Airway                 Airway - Non-Surgical 08/15/18 0819 Endotracheal Tube less than 1 day          Arterial Line                 Arterial Line 08/15/18 0842 Right Radial less than 1 day          Peripheral Intravenous Line                 Peripheral IV - Single Lumen 08/15/18 0609 Right Wrist 1 day         Peripheral IV - Single Lumen 08/15/18 0610 Left Forearm 1 day         Peripheral IV - Single Lumen 08/15/18 1500 Left Hand less than 1 day         Peripheral IV - Single Lumen 08/15/18 1505 Right Forearm less than 1 day         Peripheral IV - Single Lumen 08/15/18 1730 Right Antecubital less than 1 day                Physical Exam   Constitutional: He appears well-developed and well-nourished. He is intubated.   Eyes: Pupils are equal, round, and reactive to light.   Cardiovascular: Normal rate, regular rhythm, S1 normal and S2 normal. Exam reveals gallop and S3.   Pulses:       Radial pulses are 2+ on the right side, and 2+ on the left side.        Dorsalis pedis pulses are 2+ on the right side, and 2+ on the left side.    Systolic murmur loudest at 5th ICS MCL    Pulmonary/Chest: Effort normal. He is intubated. He has rales.   Abdominal: Soft. Bowel sounds are normal.   Musculoskeletal: He exhibits no edema.   Skin: Skin is warm and dry.   Diffuse gouty tophi   Psychiatric: He has a normal mood and affect.   Nursing note and vitals reviewed.      Significant Labs:   Recent Results (from the past 24 hour(s))   Transesophageal echo    Collection Time: 08/15/18  8:00 AM   Result Value Ref Range    EF 20 (A) 55 - 65    Mitral Valve Regurgitation MODERATE TO SEVERE (A)     Mitral Valve Mobility NORMAL     Tricuspid Valve Regurgitation MODERATE (A)    CBC auto differential    Collection Time: 08/15/18  1:35 PM   Result Value Ref Range    WBC 8.38 3.90 - 12.70 K/uL    RBC 4.90 4.60 - 6.20 M/uL    Hemoglobin 15.4 14.0 - 18.0 g/dL    Hematocrit 47.7 40.0 - 54.0 %    MCV 97 82 - 98 fL    MCH 31.4 (H) 27.0 - 31.0 pg    MCHC 32.3 32.0 - 36.0 g/dL    RDW 14.8 (H) 11.5 - 14.5 %    Platelets 154 150 - 350 K/uL    MPV 10.5 9.2 - 12.9 fL    Immature Granulocytes 1.4 (H) 0.0 - 0.5 %    Gran # (ANC) 5.4 1.8 - 7.7 K/uL    Immature Grans (Abs) 0.12 (H) 0.00 - 0.04 K/uL    Lymph # 2.1 1.0 - 4.8 K/uL    Mono # 0.6 0.3 - 1.0 K/uL    Eos # 0.1 0.0 - 0.5 K/uL    Baso # 0.10 0.00 - 0.20 K/uL    nRBC 0 0 /100 WBC    Gran% 64.4 38.0 - 73.0 %    Lymph% 24.9 18.0 - 48.0 %    Mono% 7.0 4.0 - 15.0 %    Eosinophil% 1.1 0.0 - 8.0 %    Basophil% 1.2 0.0 - 1.9 %    Differential Method Automated    Comprehensive metabolic panel    Collection Time: 08/15/18  1:35 PM   Result Value Ref Range    Sodium 139 136 - 145 mmol/L    Potassium 3.9 3.5 - 5.1 mmol/L    Chloride 107 95 - 110 mmol/L    CO2 20 (L) 23 - 29 mmol/L    Glucose 192 (H) 70 - 110 mg/dL    BUN, Bld 10 8 - 23 mg/dL    Creatinine 0.8 0.5 - 1.4 mg/dL    Calcium 8.3 (L) 8.7 - 10.5 mg/dL    Total Protein 7.2 6.0 - 8.4 g/dL    Albumin 3.4 (L) 3.5 - 5.2 g/dL    Total Bilirubin 1.9 (H) 0.1 - 1.0 mg/dL    Alkaline  Phosphatase 109 55 - 135 U/L    AST 31 10 - 40 U/L    ALT 17 10 - 44 U/L    Anion Gap 12 8 - 16 mmol/L    eGFR if African American >60.0 >60 mL/min/1.73 m^2    eGFR if non African American >60.0 >60 mL/min/1.73 m^2   Troponin I    Collection Time: 08/15/18  1:35 PM   Result Value Ref Range    Troponin I 0.837 (H) 0.000 - 0.026 ng/mL   Brain natriuretic peptide    Collection Time: 08/15/18  1:35 PM   Result Value Ref Range     (H) 0 - 99 pg/mL   Magnesium    Collection Time: 08/15/18  1:35 PM   Result Value Ref Range    Magnesium 1.4 (L) 1.6 - 2.6 mg/dL   Phosphorus    Collection Time: 08/15/18  1:35 PM   Result Value Ref Range    Phosphorus 3.8 2.7 - 4.5 mg/dL   Uric acid    Collection Time: 08/15/18  1:41 PM   Result Value Ref Range    Uric Acid 9.8 (H) 3.4 - 7.0 mg/dL   ISTAT PROCEDURE    Collection Time: 08/15/18  1:59 PM   Result Value Ref Range    POC PH 7.393 7.35 - 7.45    POC PCO2 33.8 (L) 35 - 45 mmHg    POC PO2 183 (H) 80 - 100 mmHg    POC HCO3 20.6 (L) 24 - 28 mmol/L    POC BE -4 -2 to 2 mmol/L    POC SATURATED O2 100 95 - 100 %    POC TCO2 22 (L) 23 - 27 mmol/L    Rate 16     Sample ARTERIAL     Site [place]/[facility]     Allens Test N/A     DelSys Adult Vent     Mode AC/PRVC     Vt 480     PEEP 10    POCT glucose    Collection Time: 08/15/18  1:59 PM   Result Value Ref Range    POCT Glucose 178 (H) 70 - 110 mg/dL   APTT    Collection Time: 08/15/18  2:25 PM   Result Value Ref Range    aPTT 25.8 21.0 - 32.0 sec   POCT glucose    Collection Time: 08/15/18 11:18 PM   Result Value Ref Range    POCT Glucose 99 70 - 110 mg/dL   APTT    Collection Time: 08/15/18 11:20 PM   Result Value Ref Range    aPTT 32.3 (H) 21.0 - 32.0 sec   ISTAT PROCEDURE    Collection Time: 08/16/18  3:53 AM   Result Value Ref Range    POC PH 7.482 (H) 7.35 - 7.45    POC PCO2 40.2 35 - 45 mmHg    POC PO2 256 (H) 80 - 100 mmHg    POC HCO3 30.1 (H) 24 - 28 mmol/L    POC BE 7 -2 to 2 mmol/L    POC SATURATED O2 100 95 - 100 %    POC TCO2 31  (H) 23 - 27 mmol/L    Rate 16     Sample ARTERIAL     Site [place]/[facility]     Allens Test N/A     DelSys Adult Vent     Mode AC/PRVC     Vt 400     PEEP 10     PiP 30     FiO2 60     Min Vol 7.51     Sp02 100    CBC auto differential    Collection Time: 08/16/18  3:56 AM   Result Value Ref Range    WBC 6.57 3.90 - 12.70 K/uL    RBC 4.60 4.60 - 6.20 M/uL    Hemoglobin 14.4 14.0 - 18.0 g/dL    Hematocrit 44.1 40.0 - 54.0 %    MCV 96 82 - 98 fL    MCH 31.3 (H) 27.0 - 31.0 pg    MCHC 32.7 32.0 - 36.0 g/dL    RDW 14.9 (H) 11.5 - 14.5 %    Platelets 119 (L) 150 - 350 K/uL    MPV 10.1 9.2 - 12.9 fL    Immature Granulocytes 0.5 0.0 - 0.5 %    Gran # (ANC) 4.7 1.8 - 7.7 K/uL    Immature Grans (Abs) 0.03 0.00 - 0.04 K/uL    Lymph # 1.2 1.0 - 4.8 K/uL    Mono # 0.5 0.3 - 1.0 K/uL    Eos # 0.1 0.0 - 0.5 K/uL    Baso # 0.05 0.00 - 0.20 K/uL    nRBC 0 0 /100 WBC    Gran% 71.1 38.0 - 73.0 %    Lymph% 18.3 18.0 - 48.0 %    Mono% 7.8 4.0 - 15.0 %    Eosinophil% 1.5 0.0 - 8.0 %    Basophil% 0.8 0.0 - 1.9 %    Differential Method Automated    Comprehensive metabolic panel    Collection Time: 08/16/18  3:56 AM   Result Value Ref Range    Sodium 139 136 - 145 mmol/L    Potassium 3.2 (L) 3.5 - 5.1 mmol/L    Chloride 102 95 - 110 mmol/L    CO2 24 23 - 29 mmol/L    Glucose 120 (H) 70 - 110 mg/dL    BUN, Bld 8 8 - 23 mg/dL    Creatinine 0.7 0.5 - 1.4 mg/dL    Calcium 8.4 (L) 8.7 - 10.5 mg/dL    Total Protein 7.0 6.0 - 8.4 g/dL    Albumin 3.2 (L) 3.5 - 5.2 g/dL    Total Bilirubin 1.9 (H) 0.1 - 1.0 mg/dL    Alkaline Phosphatase 97 55 - 135 U/L    AST 41 (H) 10 - 40 U/L    ALT 17 10 - 44 U/L    Anion Gap 13 8 - 16 mmol/L    eGFR if African American >60.0 >60 mL/min/1.73 m^2    eGFR if non African American >60.0 >60 mL/min/1.73 m^2   APTT    Collection Time: 08/16/18  3:56 AM   Result Value Ref Range    aPTT 41.9 (H) 21.0 - 32.0 sec   Protime-INR    Collection Time: 08/16/18  3:56 AM   Result Value Ref Range    Prothrombin Time 20.3 (H) 9.0 - 12.5  sec    INR 2.1 (H) 0.8 - 1.2   APTT    Collection Time: 08/16/18  6:31 AM   Result Value Ref Range    aPTT 37.2 (H) 21.0 - 32.0 sec   POCT glucose    Collection Time: 08/16/18  6:33 AM   Result Value Ref Range    POCT Glucose 117 (H) 70 - 110 mg/dL

## 2018-08-16 NOTE — SUBJECTIVE & OBJECTIVE
Past Medical History:   Diagnosis Date    Anticoagulant long-term use     but has been noncompliant    Arthritis     Atrial fibrillation, chronic     Cardiomyopathy, nonischemic 11/9/2014    CHF (congestive heart failure)     Coronary artery disease     Encounter for blood transfusion     Gout     Hypertension        Past Surgical History:   Procedure Laterality Date    ANKLE SURGERY      CATARACT EXTRACTION W/  INTRAOCULAR LENS IMPLANT Bilateral        Review of patient's allergies indicates:  No Known Allergies    Family History     Problem Relation (Age of Onset)    Hypertension Mother, Father    Stroke Mother        Tobacco Use    Smoking status: Never Smoker    Smokeless tobacco: Never Used   Substance and Sexual Activity    Alcohol use: Yes     Alcohol/week: 1.2 oz     Types: 2 Cans of beer per week    Drug use: No    Sexual activity: Not on file         Review of Systems   Unable to perform ROS: Intubated     Objective:     Vital Signs (Most Recent):  Temp: 98.2 °F (36.8 °C) (08/16/18 0702)  Pulse: (!) 59 (08/16/18 0935)  Resp: 17 (08/16/18 0935)  BP: (!) 103/56 (08/16/18 0900)  SpO2: 100 % (08/16/18 0935) Vital Signs (24h Range):  Temp:  [97.8 °F (36.6 °C)-99.2 °F (37.3 °C)] 98.2 °F (36.8 °C)  Pulse:  [52-66] 59  Resp:  [16-37] 17  SpO2:  [90 %-100 %] 100 %  BP: (100-159)/(56-90) 103/56  Arterial Line BP: ()/(49-98) 110/56     Weight: 82.7 kg (182 lb 5.1 oz)  Body mass index is 28.56 kg/m².      Intake/Output Summary (Last 24 hours) at 8/16/2018 1038  Last data filed at 8/16/2018 0830  Gross per 24 hour   Intake 1461.69 ml   Output 7340 ml   Net -5878.31 ml       Physical Exam   Constitutional: He appears well-developed and well-nourished. No distress. He is intubated.   Cardiovascular: Normal rate, regular rhythm and normal heart sounds.   Pulses:       Radial pulses are 1+ on the right side, and 1+ on the left side.        Posterior tibial pulses are 1+ on the right side, and 1+ on  the left side.   Pulmonary/Chest: Effort normal. He is intubated. He has rales.   Musculoskeletal: He exhibits no edema.   Neurological: He is alert.   Responds to commands appropriately   Skin: Skin is warm and dry. He is not diaphoretic.       Vents:  Vent Mode: A/C (08/16/18 0935)  Ventilator Initiated: Yes (08/15/18 1343)  Set Rate: 16 bmp (08/16/18 0935)  Vt Set: 400 mL (08/16/18 0935)  PEEP/CPAP: 8 cmH20 (08/16/18 0935)  Oxygen Concentration (%): 51 (08/16/18 0935)  Peak Airway Pressure: 26 cmH2O (08/16/18 0935)  Plateau Pressure: 19 cmH20 (08/16/18 0935)  Total Ve: 6.87 mL (08/16/18 0935)  F/VT Ratio<105 (RSBI): (!) 41.98 (08/16/18 0935)    Lines/Drains/Airways     Drain                 Urethral Catheter 08/15/18 0828 Straight-tip 16 Fr. 1 day          Airway                 Airway - Non-Surgical 08/15/18 0819 Endotracheal Tube 1 day          Arterial Line                 Arterial Line 08/15/18 0842 Right Radial 1 day          Peripheral Intravenous Line                 Peripheral IV - Single Lumen 08/15/18 0609 Right Wrist 1 day         Peripheral IV - Single Lumen 08/15/18 0610 Left Forearm 1 day         Peripheral IV - Single Lumen 08/15/18 1500 Left Hand less than 1 day         Peripheral IV - Single Lumen 08/15/18 1505 Right Forearm less than 1 day         Peripheral IV - Single Lumen 08/15/18 1730 Right Antecubital less than 1 day                Significant Labs:    CBC/Anemia Profile:  Recent Labs   Lab  08/15/18   1335  08/16/18   0356   WBC  8.38  6.57   HGB  15.4  14.4   HCT  47.7  44.1   PLT  154  119*   MCV  97  96   RDW  14.8*  14.9*        Chemistries:  Recent Labs   Lab  08/15/18   1335  08/16/18   0356   NA  139  139   K  3.9  3.2*   CL  107  102   CO2  20*  24   BUN  10  8   CREATININE  0.8  0.7   CALCIUM  8.3*  8.4*   ALBUMIN  3.4*  3.2*   PROT  7.2  7.0   BILITOT  1.9*  1.9*   ALKPHOS  109  97   ALT  17  17   AST  31  41*   MG  1.4*   --    PHOS  3.8   --          Significant Imaging:    CXR: I have reviewed all pertinent results/findings within the past 24 hours and my personal findings are:  concern for pulmonary interstitial edema

## 2018-08-16 NOTE — ASSESSMENT & PLAN NOTE
-Secondary to interstitial pulmonary edema in setting of decompensated CHF secondary to inadequate outpatient diuresis.   -Good IV diuresis overnight.  -EP planning to follow up in 4-6 weeks  -Pt passed SBT, extubation today

## 2018-08-16 NOTE — ASSESSMENT & PLAN NOTE
- patient developed acute hypoxemic respiratory failure during EP procedure for RF Ablation for Atrial Fibrillation  - per verbal report procedure was not entirely completed  - extubation not possible due to hypoxemia likely due to pulmonary edema in the setting of receipt of 2L of NS and medication non-adherence prior to procedure, patient not taking Lasix 40mg BID for the last month, BNP >750  - known to have NICM with EF 20-25%, JUDY, LA without thrombus, mod/severe MR, PFO, ePAP 62  - patient net negative >5L since admission to CCU - lasix gtt off  - ABG with pH 7.39, CO2 33, HCO3 20, PO2 183 - repeat with evidence of contraction alkalosis  - CXR with evidence of pulmonary edema   - strict I's and O's  - pulm consulted for vent management and extubation

## 2018-08-16 NOTE — PROGRESS NOTES
[facility]  Cardiac Electrophysiology  Progress Note    Admission Date: 8/15/2018  Code Status: Full Code   Attending Physician: Edd Evans MD   Expected Discharge Date: 8/20/2018  Principal Problem:Acute hypoxemic respiratory failure    Subjective:     Interval History:   Overnight with excellent diuresis -5L on Lasix 5mg ggt. On dobutamine 5mg, SBP 120s, MAPs >70s.  Patient awake and nods to questions appropriately     Telemetry: Sinus bradycardia with frequent PACs, no other telemetry events      Review of Systems   Unable to perform ROS: intubated     Objective:     Vital Signs (Most Recent):  Temp: 98.2 °F (36.8 °C) (08/16/18 0702)  Pulse: (!) 59 (08/16/18 0935)  Resp: 17 (08/16/18 0935)  BP: (!) 103/56 (08/16/18 0900)  SpO2: 100 % (08/16/18 0935) Vital Signs (24h Range):  Temp:  [97.8 °F (36.6 °C)-99.2 °F (37.3 °C)] 98.2 °F (36.8 °C)  Pulse:  [52-66] 59  Resp:  [16-37] 17  SpO2:  [90 %-100 %] 100 %  BP: (100-159)/(56-90) 103/56  Arterial Line BP: ()/(49-98) 110/56     Weight: 82.7 kg (182 lb 5.1 oz)  Body mass index is 28.56 kg/m².     SpO2: 100 %  O2 Device (Oxygen Therapy): ventilator    Physical Exam   Constitutional: He appears well-developed and well-nourished. He appears ill. He is sedated and intubated.   HENT:   Head: Normocephalic.   Eyes: Pupils are equal, round, and reactive to light.   Cardiovascular: Normal rate, regular rhythm, normal heart sounds and intact distal pulses. Exam reveals no gallop and no friction rub.   No murmur heard.  Pulmonary/Chest: Effort normal. He is intubated. No respiratory distress. He has no wheezes. He has rales. He exhibits no tenderness.   Abdominal: Soft. Bowel sounds are normal. He exhibits no distension. There is no tenderness.   Musculoskeletal: Normal range of motion. He exhibits edema.   Skin: Skin is warm and dry. No erythema. No pallor.   Nursing note and vitals reviewed.      Significant Labs:   Blood Culture: No results  for input(s): LABBLOO in the last 48 hours., BMP:   Recent Labs   Lab  08/15/18   1335  08/16/18   0356   GLU  192*  120*   NA  139  139   K  3.9  3.2*   CL  107  102   CO2  20*  24   BUN  10  8   CREATININE  0.8  0.7   CALCIUM  8.3*  8.4*   MG  1.4*   --    , CBC:   Recent Labs   Lab  08/15/18   1335  08/16/18   0356   WBC  8.38  6.57   HGB  15.4  14.4   HCT  47.7  44.1   PLT  154  119*   , INR:   Recent Labs   Lab  08/15/18   0531  08/16/18   0356   INR  2.0*  2.1*   , Lipid Panel No results for input(s): CHOL, HDL, LDLCALC, TRIG, CHOLHDL in the last 48 hours., Troponin   Recent Labs   Lab  08/15/18   1335  08/16/18   0813   TROPONINI  0.837*  1.079*    and All pertinent lab results from the last 24 hours have been reviewed.    Significant Imaging: Echocardiogram:   2D echo with color flow doppler:   Results for orders placed or performed during the hospital encounter of 02/14/18   2D echo with color flow doppler   Result Value Ref Range    EF 20 (A) 55 - 65    Mitral Valve Regurgitation MILD     Diastolic Dysfunction Yes (A)     Est. PA Systolic Pressure 62.09 (A)     Mitral Valve Mobility NORMAL     Tricuspid Valve Regurgitation MODERATE (A)      Assessment and Plan:     Paroxysmal atrial fibrillation    Mr. Mcbride is a 63 yoM, patient of Dr. Lou with hx of Afib on Warfarin. Presented for elective RFA PVI 8/15/18, patient intubated, unfortunately procedure terminated due to poor oxygenation in the setting of severe MR NICM (20%) and patient ran out of Lasix 1 week prior to procedure. Acute hypoxemic respiratory failure d/t AECHF.   - Incomplete PVI with left sided isolation and partial ablation of the right sided veins                  - Troponin elevation 1.079 likely in the setting of recent PVI and acute HF      - EP service will continue to follow, please call for any questions or concerns. Discussed with Dr. Lou, no plans for RFA PVI while inpatient, he will follow up in clinic 4 - 6 weeks.   -  Okay to extubate if patient ready from primary team  - Wean off dobutamine / Levophed if possible  - Continue aggressive diuresis   - Can start bridge Heparin to Coumadin from EP standpoint  - Resume digoxin and Amiodarone        -       Mirela Monsivais MD  Cardiac Electrophysiology  [facility]

## 2018-08-17 VITALS
WEIGHT: 174.19 LBS | RESPIRATION RATE: 23 BRPM | HEART RATE: 62 BPM | TEMPERATURE: 98 F | OXYGEN SATURATION: 94 % | BODY MASS INDEX: 27.34 KG/M2 | HEIGHT: 67 IN | DIASTOLIC BLOOD PRESSURE: 69 MMHG | SYSTOLIC BLOOD PRESSURE: 115 MMHG

## 2018-08-17 LAB
ALBUMIN SERPL BCP-MCNC: 3 G/DL
ALP SERPL-CCNC: 87 U/L
ALT SERPL W/O P-5'-P-CCNC: 18 U/L
ANION GAP SERPL CALC-SCNC: 12 MMOL/L
AST SERPL-CCNC: 34 U/L
BASOPHILS # BLD AUTO: 0.03 K/UL
BASOPHILS NFR BLD: 0.4 %
BILIRUB SERPL-MCNC: 3.3 MG/DL
BUN SERPL-MCNC: 10 MG/DL
CALCIUM SERPL-MCNC: 7.9 MG/DL
CHLORIDE SERPL-SCNC: 98 MMOL/L
CO2 SERPL-SCNC: 23 MMOL/L
CREAT SERPL-MCNC: 0.7 MG/DL
DIFFERENTIAL METHOD: ABNORMAL
EOSINOPHIL # BLD AUTO: 0 K/UL
EOSINOPHIL NFR BLD: 0.6 %
ERYTHROCYTE [DISTWIDTH] IN BLOOD BY AUTOMATED COUNT: 14.7 %
EST. GFR  (AFRICAN AMERICAN): >60 ML/MIN/1.73 M^2
EST. GFR  (NON AFRICAN AMERICAN): >60 ML/MIN/1.73 M^2
GLOBAL PERICARDIAL EFFUSION: ABNORMAL
GLUCOSE SERPL-MCNC: 132 MG/DL
HCT VFR BLD AUTO: 44.3 %
HGB BLD-MCNC: 14.2 G/DL
IMM GRANULOCYTES # BLD AUTO: 0.03 K/UL
IMM GRANULOCYTES NFR BLD AUTO: 0.4 %
INR PPP: 2.1
LYMPHOCYTES # BLD AUTO: 1.1 K/UL
LYMPHOCYTES NFR BLD: 15.1 %
MAGNESIUM SERPL-MCNC: 1.7 MG/DL
MCH RBC QN AUTO: 31.1 PG
MCHC RBC AUTO-ENTMCNC: 32.1 G/DL
MCV RBC AUTO: 97 FL
MONOCYTES # BLD AUTO: 0.9 K/UL
MONOCYTES NFR BLD: 12.4 %
NEUTROPHILS # BLD AUTO: 5 K/UL
NEUTROPHILS NFR BLD: 71.1 %
NRBC BLD-RTO: 0 /100 WBC
PHOSPHATE SERPL-MCNC: 2.4 MG/DL
PLATELET # BLD AUTO: 116 K/UL
PMV BLD AUTO: 9.6 FL
POCT GLUCOSE: 115 MG/DL (ref 70–110)
POCT GLUCOSE: 154 MG/DL (ref 70–110)
POTASSIUM SERPL-SCNC: 3.2 MMOL/L
PROT SERPL-MCNC: 7 G/DL
PROTHROMBIN TIME: 20.3 SEC
RBC # BLD AUTO: 4.57 M/UL
RETIRED EF AND QEF - SEE NOTES: 20 (ref 55–65)
SODIUM SERPL-SCNC: 133 MMOL/L
TROPONIN I SERPL DL<=0.01 NG/ML-MCNC: 0.76 NG/ML
WBC # BLD AUTO: 7.07 K/UL

## 2018-08-17 PROCEDURE — 84484 ASSAY OF TROPONIN QUANT: CPT

## 2018-08-17 PROCEDURE — 83735 ASSAY OF MAGNESIUM: CPT

## 2018-08-17 PROCEDURE — C9113 INJ PANTOPRAZOLE SODIUM, VIA: HCPCS | Performed by: STUDENT IN AN ORGANIZED HEALTH CARE EDUCATION/TRAINING PROGRAM

## 2018-08-17 PROCEDURE — 85025 COMPLETE CBC W/AUTO DIFF WBC: CPT

## 2018-08-17 PROCEDURE — 93307 TTE W/O DOPPLER COMPLETE: CPT

## 2018-08-17 PROCEDURE — 25000003 PHARM REV CODE 250: Performed by: INTERNAL MEDICINE

## 2018-08-17 PROCEDURE — 63600175 PHARM REV CODE 636 W HCPCS: Performed by: STUDENT IN AN ORGANIZED HEALTH CARE EDUCATION/TRAINING PROGRAM

## 2018-08-17 PROCEDURE — 84100 ASSAY OF PHOSPHORUS: CPT

## 2018-08-17 PROCEDURE — 99232 SBSQ HOSP IP/OBS MODERATE 35: CPT | Mod: ,,, | Performed by: INTERNAL MEDICINE

## 2018-08-17 PROCEDURE — 85610 PROTHROMBIN TIME: CPT

## 2018-08-17 PROCEDURE — 93307 TTE W/O DOPPLER COMPLETE: CPT | Mod: 26,,, | Performed by: INTERNAL MEDICINE

## 2018-08-17 PROCEDURE — 25000003 PHARM REV CODE 250: Performed by: STUDENT IN AN ORGANIZED HEALTH CARE EDUCATION/TRAINING PROGRAM

## 2018-08-17 PROCEDURE — 80053 COMPREHEN METABOLIC PANEL: CPT

## 2018-08-17 RX ORDER — FUROSEMIDE 40 MG/1
40 TABLET ORAL 2 TIMES DAILY
Qty: 60 TABLET | Refills: 11 | Status: SHIPPED | OUTPATIENT
Start: 2018-08-17 | End: 2019-11-19 | Stop reason: SDUPTHER

## 2018-08-17 RX ORDER — MAGNESIUM SULFATE HEPTAHYDRATE 40 MG/ML
2 INJECTION, SOLUTION INTRAVENOUS ONCE
Status: DISCONTINUED | OUTPATIENT
Start: 2018-08-17 | End: 2018-08-17

## 2018-08-17 RX ORDER — POTASSIUM CHLORIDE 20 MEQ/1
40 TABLET, EXTENDED RELEASE ORAL ONCE
Status: COMPLETED | OUTPATIENT
Start: 2018-08-17 | End: 2018-08-17

## 2018-08-17 RX ORDER — PANTOPRAZOLE SODIUM 40 MG/1
40 TABLET, DELAYED RELEASE ORAL DAILY
Qty: 90 TABLET | Refills: 3 | Status: ON HOLD | OUTPATIENT
Start: 2018-08-17 | End: 2021-01-01 | Stop reason: HOSPADM

## 2018-08-17 RX ORDER — SODIUM,POTASSIUM PHOSPHATES 280-250MG
2 POWDER IN PACKET (EA) ORAL ONCE
Status: COMPLETED | OUTPATIENT
Start: 2018-08-17 | End: 2018-08-17

## 2018-08-17 RX ORDER — FUROSEMIDE 10 MG/ML
40 INJECTION INTRAMUSCULAR; INTRAVENOUS ONCE
Status: COMPLETED | OUTPATIENT
Start: 2018-08-17 | End: 2018-08-17

## 2018-08-17 RX ORDER — FUROSEMIDE 40 MG/1
40 TABLET ORAL 2 TIMES DAILY
Status: DISCONTINUED | OUTPATIENT
Start: 2018-08-17 | End: 2018-08-17 | Stop reason: HOSPADM

## 2018-08-17 RX ORDER — LANOLIN ALCOHOL/MO/W.PET/CERES
400 CREAM (GRAM) TOPICAL ONCE
Status: COMPLETED | OUTPATIENT
Start: 2018-08-17 | End: 2018-08-17

## 2018-08-17 RX ADMIN — DEXTROSE 40 MG: 50 INJECTION, SOLUTION INTRAVENOUS at 08:08

## 2018-08-17 RX ADMIN — POTASSIUM CHLORIDE 40 MEQ: 1500 TABLET, EXTENDED RELEASE ORAL at 06:08

## 2018-08-17 RX ADMIN — WARFARIN SODIUM 2 MG: 2 TABLET ORAL at 05:08

## 2018-08-17 RX ADMIN — POTASSIUM & SODIUM PHOSPHATES POWDER PACK 280-160-250 MG 2 PACKET: 280-160-250 PACK at 06:08

## 2018-08-17 RX ADMIN — FUROSEMIDE 40 MG: 40 TABLET ORAL at 08:08

## 2018-08-17 RX ADMIN — MAGNESIUM OXIDE TAB 400 MG (241.3 MG ELEMENTAL MG) 400 MG: 400 (241.3 MG) TAB at 09:08

## 2018-08-17 RX ADMIN — FUROSEMIDE 40 MG: 40 TABLET ORAL at 05:08

## 2018-08-17 RX ADMIN — FUROSEMIDE 40 MG: 10 INJECTION, SOLUTION INTRAMUSCULAR; INTRAVENOUS at 11:08

## 2018-08-17 RX ADMIN — AMIODARONE HYDROCHLORIDE 200 MG: 200 TABLET ORAL at 08:08

## 2018-08-17 RX ADMIN — DIGOXIN 250 MCG: 125 TABLET ORAL at 08:08

## 2018-08-17 NOTE — ASSESSMENT & PLAN NOTE
- patient with known NICM EF 20-25 percent, known to be NYHA Class II prior to presentation to hospital  - patient on Lisinopril, Coreg, Digoxin, Amio, Warfarin, Protonix at home  - continue digoxin, amio and transition to heparin gtt for now as patient did not complete the EP procedure  - restart Lisinopril, Coreg when HF exacerbation/volume overload resolved  - follow BNP, CBC, CMP, troponin, uric acid  - uric acid grossly elevated at 9.2, patient would benefit from Colchicine therapy and then XOI to assist with reduction of tophaceous gout burden - lifestyle modifications would also assist, this would also be beneficial in the overall treatment of his DCM  - referral placed to Rheumatology

## 2018-08-17 NOTE — NURSING
Pt with discharge orders for home. IV's d/c'd. Discharge instructions with further appointment dates discussed with pt and pt's family. Discharge papers given to pt. Pt to be walked out via wheelchair, will be transported home with wife and son. MARY.

## 2018-08-17 NOTE — SUBJECTIVE & OBJECTIVE
Interval History:  No overnight issues, patient extubated yesterday, off pressure support. Diuresing well.     Review of Systems   Constitution: Positive for weakness. Negative for chills, decreased appetite, diaphoresis, fever, weight gain and weight loss.   Eyes: Negative for blurred vision.   Cardiovascular: Negative for chest pain, dyspnea on exertion, irregular heartbeat, leg swelling, near-syncope, orthopnea and palpitations.   Respiratory: Positive for shortness of breath. Negative for cough, snoring and wheezing.    Gastrointestinal: Negative for abdominal pain, nausea and vomiting.   Genitourinary: Negative for bladder incontinence and urgency.     Objective:     Vital Signs (Most Recent):  Temp: 98.2 °F (36.8 °C) (08/17/18 0705)  Pulse: 65 (08/17/18 0800)  Resp: (!) 22 (08/17/18 0800)  BP: 120/74 (08/17/18 0800)  SpO2: 95 % (08/17/18 0800) Vital Signs (24h Range):  Temp:  [98.2 °F (36.8 °C)-98.6 °F (37 °C)] 98.2 °F (36.8 °C)  Pulse:  [59-77] 65  Resp:  [11-26] 22  SpO2:  [92 %-100 %] 95 %  BP: (101-137)/(56-78) 120/74  Arterial Line BP: ()/(47-77) 101/54     Weight: 79 kg (174 lb 2.6 oz)  Body mass index is 27.28 kg/m².     SpO2: 95 %  O2 Device (Oxygen Therapy): nasal cannula    Physical Exam   Constitutional: He is oriented to person, place, and time. He appears well-developed and well-nourished. No distress.   HENT:   Head: Normocephalic.   Neck: Normal range of motion. No JVD present.   Cardiovascular: Regular rhythm, normal heart sounds and intact distal pulses. Exam reveals no gallop and no friction rub.   No murmur heard.  Pulmonary/Chest: Effort normal. No respiratory distress. He has no wheezes. He has rales. He exhibits no tenderness.   Abdominal: Soft. Bowel sounds are normal. He exhibits no distension. There is no tenderness. There is no rebound and no guarding.   Neurological: He is alert and oriented to person, place, and time.   Skin: Skin is warm and dry. No rash noted. He is not  diaphoretic. No erythema. No pallor.   Nursing note and vitals reviewed.      Significant Labs:   BMP:   Recent Labs   Lab  08/15/18   1335  08/16/18   0356  08/17/18   0317   GLU  192*  120*  132*   NA  139  139  133*   K  3.9  3.2*  3.2*   CL  107  102  98   CO2  20*  24  23   BUN  10  8  10   CREATININE  0.8  0.7  0.7   CALCIUM  8.3*  8.4*  7.9*   MG  1.4*   --   1.7   , CBC:   Recent Labs   Lab  08/15/18   1335  08/16/18   0356  08/17/18 0317   WBC  8.38  6.57  7.07   HGB  15.4  14.4  14.2   HCT  47.7  44.1  44.3   PLT  154  119*  116*   , INR:   Recent Labs   Lab  08/16/18   0356  08/17/18 0317   INR  2.1*  2.1*   , Lipid Panel No results for input(s): CHOL, HDL, LDLCALC, TRIG, CHOLHDL in the last 48 hours., Troponin   Recent Labs   Lab  08/15/18   1335  08/16/18   0813   TROPONINI  0.837*  1.079*    and All pertinent lab results from the last 24 hours have been reviewed.    Significant Imaging: Echocardiogram:   2D echo with color flow doppler:   Results for orders placed or performed during the hospital encounter of 02/14/18   2D echo with color flow doppler   Result Value Ref Range    EF 20 (A) 55 - 65    Mitral Valve Regurgitation MILD     Diastolic Dysfunction Yes (A)     Est. PA Systolic Pressure 62.09 (A)     Mitral Valve Mobility NORMAL     Tricuspid Valve Regurgitation MODERATE (A)

## 2018-08-17 NOTE — DISCHARGE INSTRUCTIONS
Patient is s/p AF ablation on 8/15/18. He will need the followin. Do not strain or lift anything greater than 5 lb for 1 week.   2. Do not drive or operate any dangerous machinery for 24 hours.   3. Keep the dressing on, clean, and dry for 24 hours.   4. After 24 hours, the dressing may be removed and a shower is allowed.   5. Clean the area with mild soap and water.   6. Once the skin has healed (1 week), bathing in a tub or swimming is allowed.   7. Inspect the groin site daily and report to the physician any signs of infection at the site: redness, pain, fever >100.4, unusual pain at the access site or affected extremity, unusual swelling at the access site, or any yellow, white or green drainage.  Call 911 if you have:   Bleeding from the puncture site that you cannot stop by doing the following:   Relax and lie down right away. Keep your leg flat and apply firm pressure to the site using your fingers and a gauze pad. Keep the pressure on for 10 minutes. Continue this until the bleeding stops. This may take awhile. When bleeding stops, cover the site with a sterile bandage and keep your leg still as much as possible.  8. Follow up with Dr. Mario Lou in 4-6 weeks.

## 2018-08-17 NOTE — ASSESSMENT & PLAN NOTE
- continue heparin gtt and amiodarone for now  - not for further intevention during this stay, resume home AC

## 2018-08-17 NOTE — PLAN OF CARE
Problem: Patient Care Overview  Goal: Plan of Care Review  Outcome: Ongoing (interventions implemented as appropriate)    No acute events throughout day. See vital signs and assessments in flowsheets. See below for updates on today's progress.     Pulmonary: -On 2LNC saturating well at 93-98% -No SOB    Cardiovascular: -HR 60's on afib -BP range at -60s -MAP maintained above 65 -No CP    Neurological: -AAOX4 -WNL    Gastrointestinal: (+)flatus (-) BM -On Cardiac diet with 1500cc FR    Genitourinary: Foleys draining beltran 30-80ml/hr    Endocrine: CBG q6hrs (HS at 146mg/dl)    Integumentary/Other: -Left fem site noted with bruising, no pain,no hematoma -With right radial A-line -PIV x 5     Infusions: -KVO    Patient progressing towards goals as tolerated, plan of care communicated and reviewed with Ute Mcbride and family. All concerns addressed. Will continue to monitor.

## 2018-08-17 NOTE — ASSESSMENT & PLAN NOTE
- follow uric acid   - patient with diffuse disease, as above would benefit from Colchicine and from XO inhibition to reduce burden of tophaceous disease  - alcohol cessation would benefit both DCM and gout   - would benefit from rheumatological consult for OP management   - rheum consult placed

## 2018-08-17 NOTE — PROGRESS NOTES
[facility]  Cardiac Electrophysiology  Progress Note    Admission Date: 8/15/2018  Code Status: Full Code   Attending Physician: Shira Hawk MD   Expected Discharge Date: 8/20/2018  Principal Problem:Acute hypoxemic respiratory failure    Subjective:     Interval History:  No overnight issues, patient extubated yesterday, off pressure support. Diuresing well.     Review of Systems   Constitution: Positive for weakness. Negative for chills, decreased appetite, diaphoresis, fever, weight gain and weight loss.   Eyes: Negative for blurred vision.   Cardiovascular: Negative for chest pain, dyspnea on exertion, irregular heartbeat, leg swelling, near-syncope, orthopnea and palpitations.   Respiratory: Positive for shortness of breath. Negative for cough, snoring and wheezing.    Gastrointestinal: Negative for abdominal pain, nausea and vomiting.   Genitourinary: Negative for bladder incontinence and urgency.     Objective:     Vital Signs (Most Recent):  Temp: 98.2 °F (36.8 °C) (08/17/18 0705)  Pulse: 65 (08/17/18 0800)  Resp: (!) 22 (08/17/18 0800)  BP: 120/74 (08/17/18 0800)  SpO2: 95 % (08/17/18 0800) Vital Signs (24h Range):  Temp:  [98.2 °F (36.8 °C)-98.6 °F (37 °C)] 98.2 °F (36.8 °C)  Pulse:  [59-77] 65  Resp:  [11-26] 22  SpO2:  [92 %-100 %] 95 %  BP: (101-137)/(56-78) 120/74  Arterial Line BP: ()/(47-77) 101/54     Weight: 79 kg (174 lb 2.6 oz)  Body mass index is 27.28 kg/m².     SpO2: 95 %  O2 Device (Oxygen Therapy): nasal cannula    Physical Exam   Constitutional: He is oriented to person, place, and time. He appears well-developed and well-nourished. No distress.   HENT:   Head: Normocephalic.   Neck: Normal range of motion. No JVD present.   Cardiovascular: Regular rhythm, normal heart sounds and intact distal pulses. Exam reveals no gallop and no friction rub.   No murmur heard.  Pulmonary/Chest: Effort normal. No respiratory distress. He has no wheezes. He has rales. He  exhibits no tenderness.   Abdominal: Soft. Bowel sounds are normal. He exhibits no distension. There is no tenderness. There is no rebound and no guarding.   Neurological: He is alert and oriented to person, place, and time.   Skin: Skin is warm and dry. No rash noted. He is not diaphoretic. No erythema. No pallor.   Nursing note and vitals reviewed.      Significant Labs:   BMP:   Recent Labs   Lab  08/15/18   1335  08/16/18   0356  08/17/18   0317   GLU  192*  120*  132*   NA  139  139  133*   K  3.9  3.2*  3.2*   CL  107  102  98   CO2  20*  24  23   BUN  10  8  10   CREATININE  0.8  0.7  0.7   CALCIUM  8.3*  8.4*  7.9*   MG  1.4*   --   1.7   , CBC:   Recent Labs   Lab  08/15/18   1335  08/16/18   0356  08/17/18   0317   WBC  8.38  6.57  7.07   HGB  15.4  14.4  14.2   HCT  47.7  44.1  44.3   PLT  154  119*  116*   , INR:   Recent Labs   Lab  08/16/18   0356  08/17/18   0317   INR  2.1*  2.1*   , Lipid Panel No results for input(s): CHOL, HDL, LDLCALC, TRIG, CHOLHDL in the last 48 hours., Troponin   Recent Labs   Lab  08/15/18   1335  08/16/18   0813   TROPONINI  0.837*  1.079*    and All pertinent lab results from the last 24 hours have been reviewed.    Significant Imaging: Echocardiogram:   2D echo with color flow doppler:   Results for orders placed or performed during the hospital encounter of 02/14/18   2D echo with color flow doppler   Result Value Ref Range    EF 20 (A) 55 - 65    Mitral Valve Regurgitation MILD     Diastolic Dysfunction Yes (A)     Est. PA Systolic Pressure 62.09 (A)     Mitral Valve Mobility NORMAL     Tricuspid Valve Regurgitation MODERATE (A)      Assessment and Plan:     Paroxysmal atrial fibrillation    Mr. Mcbride is a 63 yoM, patient of Dr. Lou with hx of Afib on Warfarin. Presented for elective RFA PVI 8/15/18, patient intubated, unfortunately procedure terminated due to poor oxygenation in the setting of severe MR NICM (20%) and patient ran out of Lasix 1 week prior to  procedure. Acute hypoxemic respiratory failure d/t AECHF. Excellent diuresis -5L, extubated, doing well. Still on oxygen 3LNC not on at home.    - Incomplete PVI with left sided isolation and partial ablation of the right sided veins   - Telemetry with Sinus bradycardia HR 50s,     - Please call for any further questions for EP service  - No medication changes from EP standpoint,  - Follow up Dr. Lou in clinic 4 - 6 weeks  - Continue aggressive diuresis, please refill lasix RX prior to discharge, stressed compliance   - Continue Coumadin on Dc, INR 2.1   - Continue PTA digoxin 250mcg and Amiodarone 200mg   - BMP, Mag, INR next week   - medical management per primary team               Mirela Monsivais MD  Cardiac Electrophysiology  [facility]

## 2018-08-17 NOTE — DISCHARGE SUMMARY
[facility]  Cardiology  Discharge Summary      Patient Name: Ute Mcbride  MRN: [id number]  Admission Date: 8/15/2018  Hospital Length of Stay: 2 days  Discharge Date and Time:  08/17/2018 4:31 PM  Attending Physician: Shira Hawk MD    Discharging Provider: Gael Sharma MD  Primary Care Physician: Kenneth Redding MD    HPI:   Mr. Ute Mcbride is a 63 year old  male who presented to have RF Ablation for atrial fibrillation with the EP team today. He is known to have NICM EF 20-25%, AFib on Warfarin, HTN, tobacco and etoh abuse. Following administration of general anesthesia patient developed hypoxic respiratory failure. Per chart review patient ran out of Lasix (which he was prescribed 40mg BID) and was only taking Warfarin, Amiodarone and Coreg as prescribed. Patient given Lasix 40mg, started on Dopamine and Epinephrine, and transferred to ICU for further evaluation and treatment. Upon arrival to CCU patient was hypertensive on dopamine, dopamine stopped, epinephrine weaned off. Patient with SpO2 of 100% on 100%. ABG drawn and instructions given to respiratory to wean down on O2 with goal O2 sat of >92%    Procedure(s) (LRB):  ABLATION (N/A)  ECHOCARDIOGRAM,TRANSESOPHAGEAL (N/A)     Indwelling Lines/Drains at time of discharge:  Lines/Drains/Airways          None          Hospital Course:  Upon arrival to CCU patient was hypertensive on dopamine, dopamine stopped, epinephrine weaned off. Patient with SpO2 of 100% on 100%. ABG drawn and instructions given to respiratory to wean down on O2 with goal O2 sat of >92%. Patient started on Heparin, Lasix gtt and pressor support, which was able to be weaned down.  Patient diuresed significantly with Lasix pushes and lasix gtt. Transitioned off Lasix gtt, heparin discontinued, restarted on home a/c with warfarin. Antiarrhythmic with digoxin and amiodarone at home doses. Remains in atrial fibrillation. 2D echo performed with  findings of LV systolic dysfunction EF 20-25%, mildly depressed RV systolic function, trivial pericardial effusion, mobile echodensity in LV likely loose chordal apparatus.     Consults:   Consults (From admission, onward)        Status Ordering Provider     Inpatient consult to Pulmonology  Once     Provider:  (Not yet assigned)    Completed BING COMBS          Significant Diagnostic Studies: Labs: All labs within the past 24 hours have been reviewed    Pending Diagnostic Studies:     None          Final Active Diagnoses:    Diagnosis Date Noted POA    PRINCIPAL PROBLEM:  Acute hypoxemic respiratory failure [J96.01] 08/15/2018 Yes    Acute on chronic systolic HF (heart failure) [I50.23] 11/07/2014 Yes    Paroxysmal atrial fibrillation [I48.0] 08/15/2018 Yes    Multiple gouty tophi [M1A.9XX1] 08/15/2018 Yes    Cardiomyopathy, nonischemic [I42.8] 11/09/2014 Yes    Elevated troponin [R74.8] 11/06/2014 Yes      Problems Resolved During this Admission:     * Acute hypoxemic respiratory failure    - patient developed acute hypoxemic respiratory failure during EP procedure for RF Ablation for Atrial Fibrillation  - per verbal report procedure was not entirely completed  - extubation not possible due to hypoxemia likely due to pulmonary edema in the setting of receipt of 2L of NS and medication non-adherence prior to procedure, patient not taking Lasix 40mg BID for the last month, BNP >750  - known to have NICM with EF 20-25%, JUDY, LA without thrombus, mod/severe MR, PFO, ePAP 62  - patient net negative >5L since admission to CCU - lasix gtt off  - ABG with pH 7.39, CO2 33, HCO3 20, PO2 183 - repeat with evidence of contraction alkalosis  - CXR with evidence of pulmonary edema - Lasix 40mg BID upon discharge  - strict I's and O's          Acute on chronic systolic HF (heart failure)    - patient with known NICM EF 20-25 percent, known to be NYHA Class II prior to presentation to hospital  - patient on Lisinopril,  Coreg, Digoxin, Amio, Warfarin, Protonix at home  - continue digoxin, amio and transition to heparin gtt for now as patient did not complete the EP procedure  - restart Lisinopril, Coreg when HF exacerbation/volume overload resolved  - follow BNP, CBC, CMP, troponin, uric acid  - uric acid grossly elevated at 9.2, patient would benefit from Colchicine therapy and then XOI to assist with reduction of tophaceous gout burden - lifestyle modifications would also assist, this would also be beneficial in the overall treatment of his DCM  - referral placed to Rheumatology        Paroxysmal atrial fibrillation    - continue heparin gtt and amiodarone for now  - not for further intevention during this stay, resume home AC        Multiple gouty tophi    - follow uric acid   - patient with diffuse disease, as above would benefit from Colchicine and from XO inhibition to reduce burden of tophaceous disease  - alcohol cessation would benefit both DCM and gout   - would benefit from rheumatological consult for OP management   - rheum consult placed        Cardiomyopathy, nonischemic    - see acute on chronic systolic heart failure         Elevated troponin    - likely demand ischemia in the setting of pulmonary edema             Discharged Condition: good    Disposition: Home or Self Care    Follow Up:  Follow-up Information     Mario Lou MD In 5 weeks.    Specialties:  Electrophysiology, Cardiovascular Disease  Contact information:  [address] [facility] [id number] [phone]                 Patient Instructions:      Ambulatory Referral to Rheumatology   Referral Priority: Routine Referral Type: Consultation   Referral Reason: Specialty Services Required   Requested Specialty: Rheumatology   Number of Visits Requested: 1     Diet Cardiac     Notify your health care provider if you experience any of the following:  severe persistent headache     Notify your health care provider if you experience any of the  following:  redness, tenderness, or signs of infection (pain, swelling, redness, odor or green/yellow discharge around incision site)     Notify your health care provider if you experience any of the following:  persistent nausea and vomiting or diarrhea     Notify your health care provider if you experience any of the following:  temperature >100.4     Notify your health care provider if you experience any of the following:  increased confusion or weakness     Notify your health care provider if you experience any of the following:  persistent dizziness, light-headedness, or visual disturbances     Activity as tolerated     Medications:  Reconciled Home Medications:      Medication List      CHANGE how you take these medications    warfarin 2 MG tablet  Commonly known as:  COUMADIN  Take 1/2 tablet (1mg) on Saturday and Sunday and 1 tablet (2mg) all other days as directed by coumadin clinic. Discontinue 5mg tablet.  What changed:  additional instructions        CONTINUE taking these medications    amiodarone 200 MG Tab  Commonly known as:  PACERONE  Take 1 tablet (200 mg total) by mouth 2 (two) times daily.     carvedilol 6.25 MG tablet  Commonly known as:  COREG  Take 1 tablet (6.25 mg total) by mouth 2 (two) times daily with meals.     digoxin 250 mcg tablet  Commonly known as:  LANOXIN  Take 1 tablet (250 mcg total) by mouth once daily.     furosemide 40 MG tablet  Commonly known as:  LASIX  Take 1 tablet (40 mg total) by mouth 2 (two) times daily.     lisinopril 10 MG tablet  Take 1 tablet (10 mg total) by mouth once daily.     pantoprazole 40 MG tablet  Commonly known as:  PROTONIX  Take 1 tablet (40 mg total) by mouth once daily.     potassium chloride SA 20 MEQ tablet  Commonly known as:  K-DUR,KLOR-CON, K-TAB  Take 20 mEq by mouth once daily. Takes two tablets by mouth once a day            Time spent on the discharge of patient: 35 minutes    Gael Sharma MD  Cardiology  [facility]

## 2018-08-17 NOTE — ASSESSMENT & PLAN NOTE
- patient developed acute hypoxemic respiratory failure during EP procedure for RF Ablation for Atrial Fibrillation  - per verbal report procedure was not entirely completed  - extubation not possible due to hypoxemia likely due to pulmonary edema in the setting of receipt of 2L of NS and medication non-adherence prior to procedure, patient not taking Lasix 40mg BID for the last month, BNP >750  - known to have NICM with EF 20-25%, JUDY, LA without thrombus, mod/severe MR, PFO, ePAP 62  - patient net negative >5L since admission to CCU - lasix gtt off  - ABG with pH 7.39, CO2 33, HCO3 20, PO2 183 - repeat with evidence of contraction alkalosis  - CXR with evidence of pulmonary edema - Lasix 40mg BID upon discharge  - strict I's and O's

## 2018-08-17 NOTE — ASSESSMENT & PLAN NOTE
Mr. Mcbride is a 63 yoM, patient of Dr. Lou with hx of Afib on Warfarin. Presented for elective RFA PVI 8/15/18, patient intubated, unfortunately procedure terminated due to poor oxygenation in the setting of severe MR NICM (20%) and patient ran out of Lasix 1 week prior to procedure. Acute hypoxemic respiratory failure d/t AECHF. Excellent diuresis -5L, extubated, doing well. Still on oxygen 3LNC not on at home.    - Incomplete PVI with left sided isolation and partial ablation of the right sided veins   - Telemetry with Sinus bradycardia HR 50s,     - Please call for any further questions for EP service  - No medication changes from EP standpoint,  - Follow up Dr. Lou in clinic 4 - 6 weeks  - Continue aggressive diuresis, please refill lasix RX prior to discharge, stressed compliance   - Continue Coumadin on Dc, INR 2.1   - Continue PTA digoxin 250mcg and Amiodarone 200mg   - BMP, Mag, INR next week   - medical management per primary team

## 2018-08-20 ENCOUNTER — PATIENT OUTREACH (OUTPATIENT)
Dept: ADMINISTRATIVE | Facility: CLINIC | Age: 63
End: 2018-08-20

## 2018-08-20 LAB
GLUCOSE SERPL-MCNC: 197 MG/DL (ref 70–110)
HCO3 UR-SCNC: 21.4 MMOL/L (ref 24–28)
HCT VFR BLD CALC: 52 %PCV (ref 36–54)
PCO2 BLDA: 38.7 MMHG (ref 35–45)
PH SMN: 7.35 [PH] (ref 7.35–7.45)
PO2 BLDA: 61 MMHG (ref 80–100)
POC ACTIVATED CLOTTING TIME K: 153 SEC (ref 74–137)
POC ACTIVATED CLOTTING TIME K: 246 SEC (ref 74–137)
POC ACTIVATED CLOTTING TIME K: 318 SEC (ref 74–137)
POC ACTIVATED CLOTTING TIME K: 329 SEC (ref 74–137)
POC ACTIVATED CLOTTING TIME K: 351 SEC (ref 74–137)
POC ACTIVATED CLOTTING TIME K: 351 SEC (ref 74–137)
POC BE: -4 MMOL/L
POC IONIZED CALCIUM: 1.09 MMOL/L (ref 1.06–1.42)
POC SATURATED O2: 90 % (ref 95–100)
POC TCO2: 23 MMOL/L (ref 23–27)
POTASSIUM BLD-SCNC: 4.2 MMOL/L (ref 3.5–5.1)
SAMPLE: ABNORMAL
SODIUM BLD-SCNC: 142 MMOL/L (ref 136–145)

## 2018-08-20 NOTE — PATIENT INSTRUCTIONS
Discharge Instructions for Catheter Ablation  You have had a procedure called catheter ablation. It was used to treat an abnormal heartbeat (arrhythmia). This procedure destroyed (ablated) the cells in your heart that were causing your heart rhythm problem. During the procedure, the healthcare provider put a thin, flexible wire (catheter) into a blood vessel in your upper thigh. The provider then threaded it up to your heart.  Home care  Here are recommendations for care at home:   · You won't be able to drive yourself home because you had medicine to relax you (sedation) You will need to make arrangements for a ride. Your healthcare provider may tell you not to drive for 24 hours after the procedure.  · You should be able to go back to your normal daily activities in the next 1 to 2 days. These include walking, climbing stairs, and doing household chores.  · Don't do any heavy physical activity for several days after the procedure. This will allow your body to heal.  · Ask your healthcare provider when you can return to work.  · Take your temperature and check your incision for signs of infection every day for a week. Signs of infection include redness, swelling, drainage, or warmth at the incision site. It is normal to have a small bruise or lump where the catheter was inserted.  · Take your medicines exactly as directed. Dont skip doses. You may need to make some changes in your medicines because of the ablation procedure. Be sure to go over your medicine instructions with your healthcare provider before you are discharged.  · Learn to take your own pulse. Keep a record of your results. Ask your healthcare provider which readings mean that you need medical attention.  · Don't lift heavy objects for a period of time after your ablation. Ask your healthcare provider for specific advice.  Follow-up care  Make a follow-up appointment as directed by your healthcare provider. Your provider will check how your  incision site is healing. In many cases, one ablation is enough to treat an arrhythmia. But sometimes the problem comes back or another is found. If this happens, you may need a second procedure.  When to call your healthcare provider  Call your healthcare provider right away if you have any of the following:  · Redness, pain, swelling, bleeding, or drainage from your incision  · Chest pain, shortness of breath, or dizziness  · Temperature of 100.4°F (38.0°C) or higher, or as directed by your healthcare provider   · Sudden coldness, pain, or numbness in the leg or arm with the insertion site  · Nausea or vomiting  Note: Ask your healthcare provider what to expect about your heartbeat. Sometimes the irregularity goes away right after the procedure. Other times it may take longer to go away.   Date Last Reviewed: 10/1/2017  © 2955-4308 [employer]. [address] [place] [id number]. All rights reserved. This information is not intended as a substitute for professional medical care. Always follow your healthcare professional's instructions.

## 2018-08-20 NOTE — PHYSICIAN QUERY
PT Name: Ute Mcbride  MR #: [id number]     Physician Query Form - Documentation Clarification      CDS/: Radha Mcclellan RN, CCDS             Contact information: [email or web address]    This form is a permanent document in the medical record.     Query Date: August 20, 2018    By submitting this query, we are merely seeking further clarification of documentation. Please utilize your independent clinical judgment when addressing the question(s) below.    The Medical record reflects the following:    Supporting Clinical Findings Location in Medical Record     Cardiomyopathy, nonischemic    He is known to have NICM EF 20-25%,           8/15 h/p    8/17 d/c summary     CONCLUSIONS     1 - Upper limit of normal left ventricular enlargement.     2 - Severely depressed left ventricular systolic function (EF 20-25%).     3 - Concentric hypertrophy.     4 - Right ventricular enlargement with mildly depressed systolic function.     5 - Trivial pericardial effusion.     6 - Increased central venous pressure.     7 - LV echo density as per text , which is likely clinicallu insignificant.        8/17 echo                                                                            Doctor, Please specify diagnosis or diagnoses associated with above clinical findings.  Please further specify type of non-ischemic cardiomyopathy.    Provider Use Only              (   XXX )  Dilated    (    )  Restrictive    (    )  Other, specify________________                                                                                                                       [  ] Clinically undetermined

## 2018-08-21 ENCOUNTER — ANTI-COAG VISIT (OUTPATIENT)
Dept: CARDIOLOGY | Facility: CLINIC | Age: 63
End: 2018-08-21
Payer: MEDICARE

## 2018-08-21 DIAGNOSIS — Z79.01 LONG TERM (CURRENT) USE OF ANTICOAGULANTS: Primary | ICD-10-CM

## 2018-08-21 LAB — INR PPP: 2 (ref 2–3)

## 2018-08-21 PROCEDURE — 85610 PROTHROMBIN TIME: CPT | Mod: PBBFAC

## 2018-08-21 NOTE — PROGRESS NOTES
Post ablation on 8/15/2018.  Patient's INR is therapeutic at 2.0.  No changes in dose.  Recheck in 3 weeks.  Please call should you have any questions or concerns at [phone] or [phone].

## 2018-09-11 ENCOUNTER — ANTI-COAG VISIT (OUTPATIENT)
Dept: CARDIOLOGY | Facility: CLINIC | Age: 63
End: 2018-09-11
Payer: MEDICARE

## 2018-09-11 DIAGNOSIS — Z79.01 LONG TERM (CURRENT) USE OF ANTICOAGULANTS: Primary | ICD-10-CM

## 2018-09-11 LAB — INR PPP: 2.8 (ref 2–3)

## 2018-09-11 PROCEDURE — 85610 PROTHROMBIN TIME: CPT | Mod: PBBFAC

## 2018-09-11 NOTE — PROGRESS NOTES
Patient's INR is therapeutic at 2.8.  No changes in dose.  Recheck in 1 month.  Please call should you have any questions or concerns at [phone] or [phone].

## 2018-09-28 DIAGNOSIS — I48.91 ATRIAL FIBRILLATION, UNSPECIFIED TYPE: Primary | ICD-10-CM

## 2018-10-25 ENCOUNTER — ANTI-COAG VISIT (OUTPATIENT)
Dept: CARDIOLOGY | Facility: CLINIC | Age: 63
End: 2018-10-25
Payer: MEDICARE

## 2018-10-25 DIAGNOSIS — Z79.01 LONG TERM (CURRENT) USE OF ANTICOAGULANTS: Primary | ICD-10-CM

## 2018-10-25 LAB — INR PPP: 2 (ref 2–3)

## 2018-10-25 PROCEDURE — 85610 PROTHROMBIN TIME: CPT | Mod: PBBFAC

## 2018-10-25 PROCEDURE — 99211 OFF/OP EST MAY X REQ PHY/QHP: CPT | Mod: PBBFAC

## 2018-10-25 PROCEDURE — 99999 PR PBB SHADOW E&M-EST. PATIENT-LVL I: CPT | Mod: PBBFAC,,,

## 2018-10-25 NOTE — PROGRESS NOTES
Patient's INR is therapeutic at 2.0.  Reports missing x 1 dose last week.  Risk factors explained, when missing doses - patient voiced understanding.  Instructed to maintain current dose of Warfarin 2 mg on Tuesday and Friday; and 1 mg on all other days per dose calendar given.  Advised to stay compliant to dose instructions.  Recheck in 1 month.

## 2018-11-30 ENCOUNTER — ANTI-COAG VISIT (OUTPATIENT)
Dept: CARDIOLOGY | Facility: CLINIC | Age: 63
End: 2018-11-30
Payer: MEDICARE

## 2018-11-30 DIAGNOSIS — Z79.01 LONG TERM (CURRENT) USE OF ANTICOAGULANTS: Primary | ICD-10-CM

## 2018-11-30 LAB — INR PPP: 2.2 (ref 2–3)

## 2018-11-30 PROCEDURE — 85610 PROTHROMBIN TIME: CPT | Mod: PBBFAC

## 2018-11-30 NOTE — PROGRESS NOTES
Patient's INR is therapeutic at 2.2.  Reports no current concerns at the time of visit.  Instructed to maintain current dose of Warfarin 2 mg every Tuesday and Friday; and 1 mg on all other days per dosing calendar given.  Recheck in 1 month.

## 2019-01-08 ENCOUNTER — OFFICE VISIT (OUTPATIENT)
Dept: CARDIOLOGY | Facility: CLINIC | Age: 64
End: 2019-01-08
Payer: MEDICARE

## 2019-01-08 ENCOUNTER — ANTI-COAG VISIT (OUTPATIENT)
Dept: CARDIOLOGY | Facility: CLINIC | Age: 64
End: 2019-01-08
Payer: MEDICARE

## 2019-01-08 VITALS
BODY MASS INDEX: 27.09 KG/M2 | WEIGHT: 172.63 LBS | DIASTOLIC BLOOD PRESSURE: 76 MMHG | HEART RATE: 76 BPM | SYSTOLIC BLOOD PRESSURE: 196 MMHG | HEIGHT: 67 IN

## 2019-01-08 DIAGNOSIS — I11.0 HYPERTENSIVE CHF (CONGESTIVE HEART FAILURE): Chronic | ICD-10-CM

## 2019-01-08 DIAGNOSIS — Z79.01 CHRONIC ANTICOAGULATION: Chronic | ICD-10-CM

## 2019-01-08 DIAGNOSIS — I48.19 PERSISTENT ATRIAL FIBRILLATION: ICD-10-CM

## 2019-01-08 DIAGNOSIS — I50.42 CHRONIC COMBINED SYSTOLIC AND DIASTOLIC CONGESTIVE HEART FAILURE: ICD-10-CM

## 2019-01-08 DIAGNOSIS — M1A.09X0 CHRONIC GOUT OF MULTIPLE SITES, UNSPECIFIED CAUSE: ICD-10-CM

## 2019-01-08 DIAGNOSIS — I42.8 CARDIOMYOPATHY, NONISCHEMIC: ICD-10-CM

## 2019-01-08 DIAGNOSIS — I48.21 ATRIAL FIBRILLATION, PERMANENT: ICD-10-CM

## 2019-01-08 DIAGNOSIS — Z79.01 LONG TERM (CURRENT) USE OF ANTICOAGULANTS: Primary | ICD-10-CM

## 2019-01-08 DIAGNOSIS — I50.42 HYPERTENSIVE HEART DISEASE WITH CHRONIC COMBINED SYSTOLIC AND DIASTOLIC CONGESTIVE HEART FAILURE: Primary | Chronic | ICD-10-CM

## 2019-01-08 DIAGNOSIS — I50.23 ACUTE ON CHRONIC SYSTOLIC HF (HEART FAILURE): ICD-10-CM

## 2019-01-08 DIAGNOSIS — I11.0 HYPERTENSIVE HEART DISEASE WITH CHRONIC COMBINED SYSTOLIC AND DIASTOLIC CONGESTIVE HEART FAILURE: Primary | Chronic | ICD-10-CM

## 2019-01-08 LAB — INR PPP: 3.6 (ref 2–3)

## 2019-01-08 PROCEDURE — 85610 PROTHROMBIN TIME: CPT | Mod: PBBFAC

## 2019-01-08 PROCEDURE — 99213 OFFICE O/P EST LOW 20 MIN: CPT | Mod: PBBFAC | Performed by: INTERNAL MEDICINE

## 2019-01-08 PROCEDURE — 99999 PR PBB SHADOW E&M-EST. PATIENT-LVL I: CPT | Mod: PBBFAC,,,

## 2019-01-08 PROCEDURE — 99211 OFF/OP EST MAY X REQ PHY/QHP: CPT | Mod: PBBFAC,27

## 2019-01-08 PROCEDURE — 99999 PR PBB SHADOW E&M-EST. PATIENT-LVL III: ICD-10-PCS | Mod: PBBFAC,,, | Performed by: INTERNAL MEDICINE

## 2019-01-08 PROCEDURE — 99999 PR PBB SHADOW E&M-EST. PATIENT-LVL III: CPT | Mod: PBBFAC,,, | Performed by: INTERNAL MEDICINE

## 2019-01-08 PROCEDURE — 99999 PR PBB SHADOW E&M-EST. PATIENT-LVL I: ICD-10-PCS | Mod: PBBFAC,,,

## 2019-01-08 PROCEDURE — 99214 OFFICE O/P EST MOD 30 MIN: CPT | Mod: S$GLB,,, | Performed by: INTERNAL MEDICINE

## 2019-01-08 PROCEDURE — 99214 PR OFFICE/OUTPT VISIT, EST, LEVL IV, 30-39 MIN: ICD-10-PCS | Mod: S$GLB,,, | Performed by: INTERNAL MEDICINE

## 2019-01-08 RX ORDER — CARVEDILOL 6.25 MG/1
6.25 TABLET ORAL 2 TIMES DAILY WITH MEALS
Qty: 60 TABLET | Refills: 1 | Status: SHIPPED | OUTPATIENT
Start: 2019-01-08 | End: 2020-01-01 | Stop reason: SDUPTHER

## 2019-01-08 RX ORDER — DIGOXIN 250 MCG
250 TABLET ORAL DAILY
Qty: 90 TABLET | Refills: 1 | Status: SHIPPED | OUTPATIENT
Start: 2019-01-08 | End: 2019-11-19 | Stop reason: SDUPTHER

## 2019-01-08 RX ORDER — LISINOPRIL 10 MG/1
10 TABLET ORAL DAILY
Qty: 90 TABLET | Refills: 1 | Status: SHIPPED | OUTPATIENT
Start: 2019-01-08 | End: 2019-05-16

## 2019-01-08 NOTE — PROGRESS NOTES
Subjective:   Patient ID:  Ute Mcbride is a 63 y.o. male who presents for cardiac consult of Hypertension (6 mo f/u) and Atrial Fibrillation      HPI  The patient came in today for cardiac consult of Hypertension (6 mo f/u) and Atrial Fibrillation    This is a 63 year old male pt with NICM EF 20-25%, Chronic AF on coumadin, HTN, alc abuse, gout in past presents for follow up.     2/14/18  Pt said he was doing ok day prior to ED visit but after drinking some beer he felt more tired and started having more palpitations. He had been seen a few years ago by cardiology and had been doing well. He is compliant with meds but is going to run out soon and came to ED for evaluation. He was given IV cardizem as he was in AF RVR and felt fine after HR improved.   ECG - AF RVR, trop negative, . Upon my exam in ED pt felt fine, HR and BP well controlled.  He had 2D ECHO which revealed LVEF 20% as in past with PH with PASP 62 mmHg along with moderate/severe RV dysfunction.   Was discharged from ED to follow up in clinic.     4/9/18  Pt presented to [facility] for PEGGY/DCCV on 3/22/18, pt had positive VERONICA thrombus with subtherapeutic INR prior to study. DCCV was not attempted and discharged to follow up at coumadin clinic. Recent INR supratherapeutic at 4.5. No bleeding issues. Pt walks every day in the morning about a mile, mild SOB but overall ok.     5/22/18  Pt had successful DCCV 4/26/18 after PEGGY revealed no VERONICA thrombus. He was started on amio 200 mg BID. PT feels well overall, no CP/SOB/palpitations. INR elevated today, no bleeding issues. Bp well controlled today.     8/15/18 unsuccessful pulmonary vein RF ablation.    1/8/18  Pt had attempt at AF ablation 8/2018 but went into resp failure during anesthesia and was transferred to ICU and restarted on CHF meds. For afib, continued on dig, amio, and coumadin.  He has not been on any meds past 3 days due to changing insurance company and just picked up meds yesterday AM.      Patient feels  no chest pain, no sob, no leg swelling, no PND, no palpitation, no dizziness, no syncope, no CNS symptoms.    Patient is compliant with medications.    2D ECHO 08/17/2018  CONCLUSIONS     1 - Upper limit of normal left ventricular enlargement.     2 - Severely depressed left ventricular systolic function (EF 20-25%).     3 - Concentric hypertrophy.     4 - Right ventricular enlargement with mildly depressed systolic function.     5 - Trivial pericardial effusion.     6 - Increased central venous pressure.     7 - LV echo density as per text , which is likely clinicallu insignificant.     This document has been electronically    SIGNED BY: Lon Reinoso MD On: 08/17/2018 15:00    Past Medical History:   Diagnosis Date    Anticoagulant long-term use     but has been noncompliant    Arthritis     Atrial fibrillation, chronic     Cardiomyopathy, nonischemic 11/9/2014    CHF (congestive heart failure)     Coronary artery disease     Encounter for blood transfusion     Gout     Hypertension        Past Surgical History:   Procedure Laterality Date    ABLATION N/A 8/15/2018    [facility] CATH LAB    ANKLE SURGERY      CARDIAC CATHETERIZATION      CARDIOVERSION N/A 4/26/2018    [facility] at [facility] CATH LAB    CARDIOVERSION N/A 3/22/2018    Per[facility] at [facility] CATH LAB    CATARACT EXTRACTION W/  INTRAOCULAR LENS IMPLANT Bilateral     COLONOSCOPY N/A 6/13/2014    Performed by Luis Rodney MD at [facility] ENDO    ECHOCARDIOGRAM,TRANSESOPHAGEAL N/A 8/15/2018    Performed by [facility] at [facility] CATH LAB    EGD N/A 6/13/2014    Performed by [facility] MD at [facility] ENDO    TRANSESOPHAGEAL ECHOCARDIOGRAM (PEGGY) N/A 4/26/2018    Performed by [facility] Foreman MD at [facility] CATH LAB    TRANSESOPHAGEAL ECHOCARDIOGRAM (PEGGY) N/A 3/22/2018    Performed by [facility]man MD at [facility] CATH LAB       Social History     Tobacco Use     Smoking status: Former Smoker     Last attempt to quit: 1994     Years since quittin.0    Smokeless tobacco: Never Used   Substance Use Topics    Alcohol use: Yes     Alcohol/week: 1.2 oz     Types: 2 Cans of beer per week    Drug use: No       Family History   Problem Relation Age of Onset    Stroke Mother     Hypertension Mother     Hypertension Father           Medication List           Accurate as of 19 10:05 AM. If you have any questions, ask your nurse or doctor.               CHANGE how you take these medications    warfarin 2 MG tablet  Commonly known as:  COUMADIN  Take 1/2 tablet (1mg) on Saturday and  and 1 tablet (2mg) all other days as directed by coumadin clinic. Discontinue 5mg tablet.  What changed:  additional instructions        CONTINUE taking these medications    amiodarone 200 MG Tab  Commonly known as:  PACERONE  Take 1 tablet (200 mg total) by mouth 2 (two) times daily.     carvedilol 6.25 MG tablet  Commonly known as:  COREG  Take 1 tablet (6.25 mg total) by mouth 2 (two) times daily with meals.     digoxin 250 mcg tablet  Commonly known as:  LANOXIN  Take 1 tablet (250 mcg total) by mouth once daily.     furosemide 40 MG tablet  Commonly known as:  LASIX  Take 1 tablet (40 mg total) by mouth 2 (two) times daily.     lisinopril 10 MG tablet  Take 1 tablet (10 mg total) by mouth once daily.     pantoprazole 40 MG tablet  Commonly known as:  PROTONIX  Take 1 tablet (40 mg total) by mouth once daily.     potassium chloride SA 20 MEQ tablet  Commonly known as:  K-DUR,KLOR-CON, K-TAB           Where to Get Your Medications      These medications were sent to [id number] [address] [facility]  [address] [address]    Phone:  [phone]   · carvedilol 6.25 MG tablet  · digoxin 250 mcg tablet  · lisinopril 10 MG tablet         Review of Systems   Constitutional: Negative.    HENT: Negative.    Eyes: Negative.    Respiratory: Negative.   "  Cardiovascular: Negative.    Gastrointestinal: Negative.    Genitourinary: Negative.    Musculoskeletal: Negative.    Skin: Negative.    Neurological: Negative.    Endo/Heme/Allergies: Negative.    Psychiatric/Behavioral: Negative.    All 12 systems otherwise negative.      Wt Readings from Last 3 Encounters:   01/08/19 78.3 kg (172 lb 9.9 oz)   08/17/18 79 kg (174 lb 2.6 oz)   06/07/18 81.4 kg (179 lb 5.5 oz)     Temp Readings from Last 3 Encounters:   08/17/18 98.2 °F (36.8 °C)   04/26/18 97.8 °F (36.6 °C) (Oral)   03/22/18 97.9 °F (36.6 °C) (Oral)     BP Readings from Last 3 Encounters:   01/08/19 (!) 196/76   08/17/18 115/69   06/07/18 (!) 164/92     Pulse Readings from Last 3 Encounters:   01/08/19 76   08/17/18 62   06/07/18 80       BP (!) 196/76 (BP Method: Small (Manual))   Pulse 76   Ht 5' 7" (1.702 m)   Wt 78.3 kg (172 lb 9.9 oz)   BMI 27.04 kg/m²     Objective:   Physical Exam   Constitutional: He is oriented to person, place, and time. He appears well-developed and well-nourished. No distress.   HENT:   Head: Normocephalic and atraumatic.   Nose: Nose normal.   Mouth/Throat: Oropharynx is clear and moist.   Eyes: Conjunctivae and EOM are normal. No scleral icterus.   Neck: Normal range of motion. Neck supple. No JVD present. No thyromegaly present.   Cardiovascular: Normal rate, S1 normal and S2 normal. An irregularly irregular rhythm present. Exam reveals no gallop, no S3, no S4 and no friction rub.   No murmur heard.  Pulmonary/Chest: Effort normal and breath sounds normal. No stridor. No respiratory distress. He has no wheezes. He has no rales. He exhibits no tenderness.   Abdominal: Soft. Bowel sounds are normal. He exhibits no distension and no mass. There is no tenderness. There is no rebound.   Genitourinary:   Genitourinary Comments: Deferred   Musculoskeletal: Normal range of motion. He exhibits deformity. He exhibits no edema or tenderness.   Gout   Lymphadenopathy:     He has no " cervical adenopathy.   Neurological: He is alert and oriented to person, place, and time. He exhibits normal muscle tone. Coordination normal.   Skin: Skin is warm and dry. Rash noted. He is not diaphoretic. No erythema. No pallor.   Psychiatric: He has a normal mood and affect. His behavior is normal. Judgment and thought content normal.   Nursing note and vitals reviewed.      Lab Results   Component Value Date     (L) 08/17/2018    K 3.2 (L) 08/17/2018    CL 98 08/17/2018    CO2 23 08/17/2018    BUN 10 08/17/2018    CREATININE 0.7 08/17/2018     (H) 08/17/2018    HGBA1C 6.4 (H) 11/07/2014    MG 1.7 08/17/2018    AST 34 08/17/2018    ALT 18 08/17/2018    ALBUMIN 3.0 (L) 08/17/2018    PROT 7.0 08/17/2018    BILITOT 3.3 (H) 08/17/2018    WBC 7.07 08/17/2018    HGB 14.2 08/17/2018    HCT 44.3 08/17/2018    HCT 52 08/15/2018    MCV 97 08/17/2018     (L) 08/17/2018    INR 2.2 11/30/2018    INR 2.1 (H) 08/17/2018    TSH 4.36 (H) 01/13/2010     (H) 08/15/2018     Assessment:      1. Hypertensive heart disease with chronic combined systolic and diastolic congestive heart failure    2. Persistent atrial fibrillation    3. Cardiomyopathy, nonischemic    4. Chronic anticoagulation    5. Chronic gout of multiple sites, unspecified cause    6. Hypertensive CHF (congestive heart failure)    7. Acute on chronic systolic HF (heart failure)    8. Atrial fibrillation, permanent        Plan:   1. NICM EF 20-25% - pt euvolemic   - cont Coreg and lisinopril  - continue lasix and K supplement  - needs EP f/u for ICD    2. AF - s/p Unsuccessful pulmonary vein RF ablation.  - cont Coumadin and Coreg and Digoxin  - cont amio  - rate controlled  - needs coumadin clinic for INR    3. HTN - BP elevated due to off med x 3 days  - cont meds, low salt diet   - check BP at home and report   - close follow up    4. Gout  - refer to Rheum    Thank you for allowing me to participate in this patient's care. Please do not  hesitate to contact me with any questions or concerns. Consult note has been forwarded to the referral physician.

## 2019-01-08 NOTE — PROGRESS NOTES
"Patient's INR is supra therapeutic at 3.6.  Reports having a "few beers over the weekend".  No signs of any abnormal bleeding reported.  Alcohol consumption interaction - education given.  Instructed patient to hold Warfarin dose today - only, then resume on regular scheduled dose of Warfarin 2 mg every Tuesday and Friday; and 1 mg on all other days per dosing calendar given.  Advised to decrease alcohol consumption and to seek medical attention (ED) for any signs of abnormal bleeding, if needed - patient voiced understanding.  Recheck in 2 weeks.    "

## 2019-01-22 ENCOUNTER — ANTI-COAG VISIT (OUTPATIENT)
Dept: CARDIOLOGY | Facility: CLINIC | Age: 64
End: 2019-01-22
Payer: MEDICARE

## 2019-01-22 DIAGNOSIS — Z79.01 LONG TERM (CURRENT) USE OF ANTICOAGULANTS: Primary | ICD-10-CM

## 2019-01-22 LAB — INR PPP: 3.8 (ref 2–3)

## 2019-01-22 PROCEDURE — 85610 PROTHROMBIN TIME: CPT | Mod: PBBFAC

## 2019-01-22 NOTE — PROGRESS NOTES
Patient's INR remains elevated at 3.8.  Reports no recent medication changes.  Reports constant arthritis pain.  No signs of any abnormal bleeding reported.  Instructed to hold Warfarin dose today - only, then will decrease Warfarin dose to 2 mg every Tuesday and 1 mg on all other days per dosing calendar given.  Recheck in 2 weeks.  Advised to seek medical attention (ED) for any signs of abnormal bleeding, if needed.  Patient voiced understanding.

## 2019-02-12 ENCOUNTER — ANTI-COAG VISIT (OUTPATIENT)
Dept: CARDIOLOGY | Facility: CLINIC | Age: 64
End: 2019-02-12
Payer: MEDICARE

## 2019-02-12 DIAGNOSIS — Z79.01 LONG TERM (CURRENT) USE OF ANTICOAGULANTS: Primary | ICD-10-CM

## 2019-02-12 LAB — INR PPP: 2.5 (ref 2–3)

## 2019-02-12 PROCEDURE — 85610 POCT INR: ICD-10-PCS | Mod: QW,S$GLB,, | Performed by: NUCLEAR MEDICINE

## 2019-02-12 PROCEDURE — 85610 PROTHROMBIN TIME: CPT | Mod: QW,S$GLB,, | Performed by: NUCLEAR MEDICINE

## 2019-02-12 NOTE — PROGRESS NOTES
Patient's INR is therapeutic at 2.5.  Reports constant gout pain - has an appointment scheduled for 3/04/2019.  Will maintain current dose of Warfarin 2 mg every Tuesday and 1 mg on all other days per week.  Dose calendar - given.  Recheck in 2 weeks.

## 2019-03-01 ENCOUNTER — ANTI-COAG VISIT (OUTPATIENT)
Dept: CARDIOLOGY | Facility: CLINIC | Age: 64
End: 2019-03-01
Payer: MEDICARE

## 2019-03-01 DIAGNOSIS — Z79.01 LONG TERM (CURRENT) USE OF ANTICOAGULANTS: Primary | ICD-10-CM

## 2019-03-01 LAB — INR PPP: 1.8 (ref 2–3)

## 2019-03-01 PROCEDURE — 99211 OFF/OP EST MAY X REQ PHY/QHP: CPT | Mod: 25,S$GLB,,

## 2019-03-01 PROCEDURE — 99211 PR OFFICE/OUTPT VISIT, EST, LEVL I: ICD-10-PCS | Mod: 25,S$GLB,,

## 2019-03-01 PROCEDURE — 85610 POCT INR: ICD-10-PCS | Mod: QW,S$GLB,, | Performed by: INTERNAL MEDICINE

## 2019-03-01 PROCEDURE — 85610 PROTHROMBIN TIME: CPT | Mod: QW,S$GLB,, | Performed by: INTERNAL MEDICINE

## 2019-03-01 NOTE — PROGRESS NOTES
Patient's INR is sub-therapeutic at 1.8.  No significant changes or missed doses reported.  No abnormal weakness or numbness noted.  Instructions given for patient to take coumadin 2mg on today; then resume current dose of 2mg on Tuesdays and 1mg on all other days of the week.  Patient voiced understanding.  Follow-up in 3 weeks.

## 2019-03-04 ENCOUNTER — INITIAL CONSULT (OUTPATIENT)
Dept: RHEUMATOLOGY | Facility: CLINIC | Age: 64
End: 2019-03-04
Payer: MEDICARE

## 2019-03-04 ENCOUNTER — HOSPITAL ENCOUNTER (OUTPATIENT)
Dept: RADIOLOGY | Facility: HOSPITAL | Age: 64
Discharge: HOME | End: 2019-03-04
Attending: STUDENT IN AN ORGANIZED HEALTH CARE EDUCATION/TRAINING PROGRAM
Payer: MEDICARE

## 2019-03-04 VITALS
HEART RATE: 67 BPM | SYSTOLIC BLOOD PRESSURE: 138 MMHG | WEIGHT: 172.81 LBS | BODY MASS INDEX: 27.12 KG/M2 | DIASTOLIC BLOOD PRESSURE: 80 MMHG | HEIGHT: 67 IN

## 2019-03-04 DIAGNOSIS — M10.9 GOUT, UNSPECIFIED CAUSE, UNSPECIFIED CHRONICITY, UNSPECIFIED SITE: ICD-10-CM

## 2019-03-04 DIAGNOSIS — M10.9 GOUT, UNSPECIFIED CAUSE, UNSPECIFIED CHRONICITY, UNSPECIFIED SITE: Primary | ICD-10-CM

## 2019-03-04 PROCEDURE — 73130 PR  X-RAY HAND 3+ VW: ICD-10-PCS | Mod: 26,59,LT, | Performed by: RADIOLOGY

## 2019-03-04 PROCEDURE — 73130 X-RAY EXAM OF HAND: CPT | Mod: 26,RT,, | Performed by: RADIOLOGY

## 2019-03-04 PROCEDURE — 3008F BODY MASS INDEX DOCD: CPT | Mod: CPTII,S$GLB,, | Performed by: STUDENT IN AN ORGANIZED HEALTH CARE EDUCATION/TRAINING PROGRAM

## 2019-03-04 PROCEDURE — 73130 X-RAY EXAM OF HAND: CPT | Mod: 26,59,LT, | Performed by: RADIOLOGY

## 2019-03-04 PROCEDURE — 3008F PR BODY MASS INDEX (BMI) DOCUMENTED: ICD-10-PCS | Mod: CPTII,S$GLB,, | Performed by: STUDENT IN AN ORGANIZED HEALTH CARE EDUCATION/TRAINING PROGRAM

## 2019-03-04 PROCEDURE — 99999 PR PBB SHADOW E&M-EST. PATIENT-LVL IV: CPT | Mod: PBBFAC,,, | Performed by: STUDENT IN AN ORGANIZED HEALTH CARE EDUCATION/TRAINING PROGRAM

## 2019-03-04 PROCEDURE — 99204 PR OFFICE/OUTPT VISIT, NEW, LEVL IV, 45-59 MIN: ICD-10-PCS | Mod: S$GLB,,, | Performed by: STUDENT IN AN ORGANIZED HEALTH CARE EDUCATION/TRAINING PROGRAM

## 2019-03-04 PROCEDURE — 99204 OFFICE O/P NEW MOD 45 MIN: CPT | Mod: S$GLB,,, | Performed by: STUDENT IN AN ORGANIZED HEALTH CARE EDUCATION/TRAINING PROGRAM

## 2019-03-04 PROCEDURE — 73130 X-RAY EXAM OF HAND: CPT | Mod: 50,TC

## 2019-03-04 PROCEDURE — 99999 PR PBB SHADOW E&M-EST. PATIENT-LVL IV: ICD-10-PCS | Mod: PBBFAC,,, | Performed by: STUDENT IN AN ORGANIZED HEALTH CARE EDUCATION/TRAINING PROGRAM

## 2019-03-04 RX ORDER — ALLOPURINOL 300 MG/1
300 TABLET ORAL DAILY
Status: ON HOLD | COMMUNITY
Start: 2017-03-06 | End: 2021-01-01 | Stop reason: HOSPADM

## 2019-03-04 RX ORDER — COLCHICINE 0.6 MG/1
0.6 CAPSULE ORAL DAILY
Qty: 30 CAPSULE | Refills: 5 | Status: SHIPPED | OUTPATIENT
Start: 2019-03-04 | End: 2019-06-10

## 2019-03-04 RX ORDER — COLCHICINE 0.6 MG/1
0.6 CAPSULE ORAL DAILY
Qty: 30 CAPSULE | Refills: 5 | Status: SHIPPED | OUTPATIENT
Start: 2019-03-04 | End: 2019-05-16 | Stop reason: SDUPTHER

## 2019-03-04 RX ORDER — DOXYCYCLINE 100 MG/1
100 CAPSULE ORAL 2 TIMES DAILY
Qty: 14 CAPSULE | Refills: 0 | Status: SHIPPED | OUTPATIENT
Start: 2019-03-04 | End: 2019-05-16

## 2019-03-04 RX ORDER — MUPIROCIN 20 MG/G
OINTMENT TOPICAL 3 TIMES DAILY
Qty: 1 TUBE | Refills: 3 | Status: ON HOLD | OUTPATIENT
Start: 2019-03-04 | End: 2021-01-01 | Stop reason: HOSPADM

## 2019-03-04 NOTE — PATIENT INSTRUCTIONS
Gout    Gout is an inflammation of a joint due to a build-up of gout crystals in the joint fluid. This occurs when there is an excess of uric acid (a normal waste product) in the body. Uric acid builds up in the body when the kidneys are unable to filter enough of it from the blood. This may occur with age. It is also associated with kidney disease. Gout occurs more often in people with obesity, diabetes, high blood pressure, or high levels of fats in the blood. It may run in families. Gout tends to come and go. A flare up of gout is called an attack. Drinking alcohol or eating certain foods (such as shellfish or foods with additives such as high-fructose corn syrup) may increase uric acid levels in the blood and cause a gout attack.  During a gout attack, the affected joint may become a hot, red, swollen and painful. If you have had one attack of gout, you are likely to have another. An attack of gout can be treated with medicine. If these attacks become frequent, a daily medicine may be prescribed to help the kidneys remove uric acid from the body.  Home care  During a gout attack:  · Rest painful joints. If gout affects the joints of your foot or leg, you may want to use crutches for the first few days to keep from bearing weight on the affected joint.  · When sitting or lying down, raise the painful joint to a level higher than your heart.  · Apply an ice pack (ice cubes in a plastic bag wrapped in a thin towel) over the injured area for 20 minutes every 1 to 2 hours the first day for pain relief. Continue this 3 to 4 times a day for swelling and pain.  · Avoid alcohol and foods listed below (see Preventing attacks) during a gout attack. Drink extra fluid to help flush the uric acid through your kidneys.  · If you were prescribed a medicine to treat gout, take it as your healthcare provider has instructed. Don't skip doses.  · Take anti-inflammatory medicine as directed.   · If pain medicines have been  prescribed, take them exactly as directed.    Preventing attacks  · Minimize or avoid alcohol use. Excess alcohol intake can cause a gout attack.  · Limit these foods and beverages:  ¨ Organ meats, such as kidneys and liver  ¨ Certain seafoods (anchovies, sardines, shrimp, scallops, herring, mackerel)  ¨ Wild game, meat extracts and meat gravies  ¨ Foods and beverages sweetened with high-fructose corn syrup, such as sodas  · Eat a healthy diet including low-fat and nonfat dairy, whole grains, and vegetables.  · If you are overweight, talk to your healthcare provider about a weight reduction plan. Avoid fasting or extreme low calorie diets (less than 900 calories per day). This will increase uric acid levels in the body.  · If you have diabetes or high blood pressure, work with your doctor to manage these conditions.  · Protect the joint from injury. Trauma can trigger a gout attack.  Follow-up care  Follow up with your healthcare provider, or as advised.   When to seek medical advice  Call your healthcare provider if you have any of the following:  · Fever over 100.4°F (38.ºC) with worsening joint pain  · Increasing redness around the joint  · Pain developing in another joint  · Repeated vomiting, abdominal pain, or blood in the vomit or stool (black or red color)  Date Last Reviewed: 3/1/2017  © 7457-0396 [employer]. [address] [place] [address], [place] [id number]. All rights reserved. This information is not intended as a substitute for professional medical care. Always follow your healthcare professional's instructions.        Eating to Prevent Gout  Gout is a painful form of arthritis caused by an excess of uric acid. This is a waste product made by the body. It builds up in the body and forms crystals that collect in the joints, bringing on a gout attack. Alcohol and certain foods can trigger a gout attack. Below are some guidelines for changing your diet to help you manage gout. Your healthcare  provider can work with you to determine the best eating plan for you. Know that diet is only one part of managing gout. Take your medicines as prescribed and follow the other guidelines your healthcare provider has given you.  Foods to limit  Eating too many foods containing purines may increase the levels of uric acid in your body and increase your risk for a gout attack. It may be best to limit these high-purine foods:  · Alcohol (beer, red wine). You may be told to avoid alcohol completely.  · Certain fish (anchovies, sardines, fish roes, herring, tuna, mussels, codfish, scallops, trout, and ranjit)  · Certain meats (red meat, processed meat, chino, turkey, wild game, and goose)  · Sauces and gravies made with meat  · Organ meats (such as liver, kidneys, sweetbreads, and tripe)  · Legumes (such as dried beans, peas)  · Mushrooms, spinach, asparagus, and cauliflower  · Yeast and yeast extract supplements  Foods to try  Some foods may be helpful for people with gout. You may want to try adding some of the following foods to your diet:  · Dark berries: These include blueberries, blackberries, and cherries. These berries contain chemicals that may lower uric acid.  · Tofu: Tofu, which is made from soy, is a good source of protein. Studies have shown that it may be a better choice than meat for people with gout.  · Omega fatty acids: These acids are found in fatty fish (such as salmon), certain oils (such as flax, olive, or nut oils), or nuts. They may help prevent inflammation due to gout.  The following guidelines are recommended by the American Medical Association for people with gout. Your diet should be:  · High in fiber, whole grains, fruits, and vegetables.  · Low in protein (15% of calories should come from protein. Choose lean sources such as soy, lean meats, and poultry).  · Low in fat (no more than 30% of calories should come from fat, with only 10% coming from animal fat).   Date Last Reviewed:  6/17/2015  © 0932-6446 [employer], [employer]. [address] [place] [address], [place] [address]. All rights reserved. This information is not intended as a substitute for professional medical care. Always follow your healthcare professional's instructions.

## 2019-03-04 NOTE — LETTER
March 6, 2019      Chano Foreman MD  [id number] [facility]  [address] [place] [address]           [facility]  [address] [place] [address] [address] [place] [address]  Phone: [phone]  Fax: [phone]          Patient: Ute Mcbride   MR Number: [id number]   YOB: 1955   Date of Visit: 3/4/2019       Dear Dr. Chano Foreman:    Thank you for referring Ute Mcbride to me for evaluation. Attached you will find relevant portions of my assessment and plan of care.    If you have questions, please do not hesitate to call me. I look forward to following Ute Mcbride along with you.    Sincerely,    Mackenzie Overton MD    Enclosure  CC:  No Recipients    If you would like to receive this communication electronically, please contact [email or web address] or [phone] to request more information on [employer] access.    For providers and/or their staff who would like to refer a patient to [facility], please contact us through our one-stop-shop provider referral line, [facility], [place], at [phone].    If you feel you have received this communication in error or would no longer like to receive these types of communications, please e-mail [email or web address]

## 2019-03-06 NOTE — PROGRESS NOTES
RHEUMATOLOGY OUTPATIENT CLINIC NOTE        Attending Rheumatologist: Mackenzie Overton  Primary Care Provider: Kenneth Redding MD   Physician Requesting Consultation: Chano Foreman [place]  [id number] [place] [address]  Chief Complaint/Reason For Consultation:  Gout (Severe Ward hands, elbows)      Subjective:       HPI  Ute Mcbride is a 63 y.o. White male presents for initial evaluation of chronic tophaceous gout. Reports 10+year hx. Does not recall age of onset or joint involvement. Denies previous hx diagnostic arthrocentesis, reports dx made via clinical hx and presentation. Reports compliance w urate lowering therapy for past few years, currently on allopurinol 300mg daily. Denies any other gout medications previously. No hx colchicine. Does not recall last flare. States he does not get them too often anymore, but with chronic pain in hands, wrists, and elbows. Main complaint is extruding tophi on hands and elbows. No fevers.  No other acute issues or complaints.     14pt ros negative except as otherwise stated above     ,m.  Review of Systems   Constitutional: Negative for chills, fever and malaise/fatigue.   HENT: Negative for congestion, hearing loss and sore throat.    Eyes: Negative for blurred vision, photophobia and redness.   Respiratory: Negative for cough, hemoptysis and shortness of breath.    Cardiovascular: Negative for chest pain, orthopnea and leg swelling.   Gastrointestinal: Negative for abdominal pain, heartburn and vomiting.   Genitourinary: Negative for dysuria and frequency.   Musculoskeletal: Positive for joint pain. Negative for back pain, falls and myalgias.   Skin: Negative for itching and rash.   Neurological: Negative for dizziness, tingling and sensory change.   Endo/Heme/Allergies: Negative for polydipsia. Does not bruise/bleed easily.   Psychiatric/Behavioral: Negative for depression and substance abuse.       Chronic comorbid conditions affecting medical decision  making today:  Past Medical History:   Diagnosis Date    Anticoagulant long-term use     but has been noncompliant    Arthritis     Atrial fibrillation, chronic     Cardiomyopathy, nonischemic 2014    CHF (congestive heart failure)     Coronary artery disease     Encounter for blood transfusion     Gout     Hypertension      Past Surgical History:   Procedure Laterality Date    ABLATION N/A 8/15/2018    [facility] CATH LAB    ANKLE SURGERY      CARDIAC CATHETERIZATION      CARDIOVERSION N/A 2018    [facility] CATH LAB    CARDIOVERSION N/A 3/22/2018    [facility] at [facility] CATH LAB    CATARACT EXTRACTION W/  INTRAOCULAR LENS IMPLANT Bilateral     COLONOSCOPY N/A 2014    [facility]formed by [facility] at [facility] ENDO    ECHOCARDIOGRAM,TRANSESOPHAGEAL N/A 8/15/2018    [facility] CATH LAB    EGD N/A 2014    [facility]formed by [facility] at [facility] ENDO    TRANSESOPHAGEAL ECHOCARDIOGRAM (PEGGY) N/A 2018    [facility] at [facility] CATH LAB    TRANSESOPHAGEAL ECHOCARDIOGRAM (PEGGY) N/A 3/22/2018    [facility] by [facility] at [facility] CATH LAB     Family History   Problem Relation Age of Onset    Stroke Mother     Hypertension Mother     Hypertension Father      Social History     Substance and Sexual Activity   Alcohol Use Yes    Alcohol/week: 1.2 oz    Types: 2 Cans of beer per week     Social History     Tobacco Use   Smoking Status Former Smoker    Last attempt to quit: 1994    Years since quittin.1   Smokeless Tobacco Never Used     Social History     Substance and Sexual Activity   Drug Use No       Current Outpatient Medications:     allopurinol (ZYLOPRIM) 300 MG tablet, Take 300 mg by mouth., Disp: , Rfl:     amiodarone (PACERONE) 200 MG Tab, Take 1 tablet (200 mg total) by mouth 2 (two) times daily., Disp: 120 tablet, Rfl: 3     carvedilol (COREG) 6.25 MG tablet, Take 1 tablet (6.25 mg total) by mouth 2 (two) times daily with meals., Disp: 60 tablet, Rfl: 1    digoxin (LANOXIN) 250 mcg tablet, Take 1 tablet (250 mcg total) by mouth once daily., Disp: 90 tablet, Rfl: 1    furosemide (LASIX) 40 MG tablet, Take 1 tablet (40 mg total) by mouth 2 (two) times daily., Disp: 60 tablet, Rfl: 11    lisinopril 10 MG tablet, Take 1 tablet (10 mg total) by mouth once daily., Disp: 90 tablet, Rfl: 1    pantoprazole (PROTONIX) 40 MG tablet, Take 1 tablet (40 mg total) by mouth once daily., Disp: 90 tablet, Rfl: 3    potassium chloride SA (K-DUR,KLOR-CON, K-TAB) 20 MEQ tablet, Take 20 mEq by mouth once daily. , Disp: , Rfl:     warfarin (COUMADIN) 2 MG tablet, Take 1/2 tablet (1mg) on Saturday and Sunday and 1 tablet (2mg) all other days as directed by coumadin clinic. Discontinue 5mg tablet. (Patient taking differently: Take 1 tablet on Tuesdays and 1/2 tablet on all other day by mouth every evening as directed by coumadin clinic.), Disp: 30 tablet, Rfl: 3    colchicine (MITIGARE) 0.6 mg Cap, Take 1 capsule (0.6 mg total) by mouth once daily., Disp: 30 capsule, Rfl: 5    colchicine (MITIGARE) 0.6 mg Cap, Take 1 capsule (0.6 mg total) by mouth once daily., Disp: 30 capsule, Rfl: 5    doxycycline (VIBRAMYCIN) 100 MG Cap, Take 1 capsule (100 mg total) by mouth 2 (two) times daily., Disp: 14 capsule, Rfl: 0    mupirocin (BACTROBAN) 2 % ointment, Apply topically 3 (three) times daily., Disp: 1 Tube, Rfl: 3       Objective:         Vitals:    03/04/19 1313   BP: 138/80   Pulse: 67      Physical Exam   Nursing note and vitals reviewed.  Constitutional:   oriented to person, place, and time and well-developed, well-nourished, and in no distress. No distress.   HENT:   Head: Normocephalic and atraumatic.   Right Ear: External ear normal.   Left Ear: External ear normal.   Eyes: Conjunctivae and EOM are normal. Pupils are equal, round, and reactive to  light.   Neck: Normal range of motion. Neck supple.   Cardiovascular: Normal rate, regular rhythm and normal heart sounds.    Pulmonary/Chest: Effort normal and breath sounds normal. No respiratory distress.  no wheezes.   Abdominal: Soft. Bowel sounds are normal.   Neurological:   alert and oriented to person, place, and time.   Skin: Skin is warm and dry. No rash noted.     Psychiatric: Affect and judgment normal.   Musculoskeletal: Normal range of motion.   exhibits tenderness. Severe tophi to bilateral hands /elbows w multiple extruding tophi. +surrounding erythema and drainage and swelling. +honey colored crusting on right elbow. +R olecranon bursitis.         Reviewed old and all outside pertinent medical records available.    All lab results personally reviewed and interpreted by me.  Lab Results   Component Value Date    WBC 6.13 03/04/2019    HGB 13.8 (L) 03/04/2019    HCT 43.5 03/04/2019    MCV 93 03/04/2019    MCH 29.6 03/04/2019    MCHC 31.7 (L) 03/04/2019    RDW 14.3 03/04/2019     03/04/2019    MPV 8.5 (L) 03/04/2019    PLTEST Appears normal 06/11/2014       Lab Results   Component Value Date     03/04/2019    K 4.0 03/04/2019     03/04/2019    CO2 30 (H) 03/04/2019     (H) 03/04/2019    BUN 11 03/04/2019    CALCIUM 10.3 03/04/2019    PROT 8.9 (H) 03/04/2019    ALBUMIN 3.7 03/04/2019    BILITOT 0.7 03/04/2019    AST 37 03/04/2019    ALKPHOS 108 03/04/2019    ALT 35 03/04/2019       Lab Results   Component Value Date    COLORU Yellow 11/07/2014    APPEARANCEUA Clear 11/07/2014    SPECGRAV 1.010 11/07/2014    PHUR 6.0 11/07/2014    PROTEINUA Negative 11/07/2014    KETONESU Negative 11/07/2014    LEUKOCYTESUR Negative 11/07/2014    NITRITE Negative 11/07/2014    UROBILINOGEN Negative 11/07/2014       Lab Results   Component Value Date    CRP 47.6 (H) 03/04/2019       Lab Results   Component Value Date    SEDRATE 46 (H) 03/04/2019       Lab Results   Component Value Date     SEDRATE 46 (H) 03/04/2019       No components found for: 25OHVITDTOT, 93OAGWYI7, 67DYVWGO1, METHODNOTE    Lab Results   Component Value Date    URICACID 8.7 (H) 03/04/2019       No components found for: TSPOTTB       ASSESSMENT / PLAN:     Ute Mcbride is a 63 y.o. White male with:      #Chronic tophaceous gout  -Severe tophi burden; multiple extruding tophi  -15+year hx gout; Pt reports joint involvement mainly in hands and elbows. Does not recall last flare, but reports now chronic progressive pain in hands  -Previous hx urate lowering therapy only w allopurinol. Reports compliance daily for past 2 years. No hx colcrys  -Last uric acid 8/18: 9.7 ; goal uric acid <5; not at goal  -Presents for initial evaluation. Will obtain baseline studies and labs as below. Further pharmacotherapeutic changes after reviewing labs  -C/w allopurinol 300mg daily until further review. Contact clinic if flares. Prednisone prn flares. +Significant cvs hx w/ chf. Avoid nsaids..   -Counseled on dietary and lifestyle changes. Limit triggers (beer and seafood). Handout given    - Comprehensive metabolic panel; Future  - CBC auto differential; Standing  - Sedimentation rate; Standing  - C-reactive protein; Future  - Uric acid; Future  - X-Ray Hand Complete Bilateral; Future  - X-Ray Hand Complete Bilateral; Future    #Cellulitis  -2.2 extruding gout tophi  -Start po abx and topical bactroban    . Other specified counseling  - Immunization counseling done  - Weight loss counseling done  - Nutrition and exercise counseling  - Medication counseling provided        Method of contact with patient concerns: [facility]      Mackenzie Overton M.D.  Rheumatology Department   [facility] - [address] [address]  Phone: [phone]  Fax: [phone]

## 2019-03-22 ENCOUNTER — ANTI-COAG VISIT (OUTPATIENT)
Dept: CARDIOLOGY | Facility: CLINIC | Age: 64
End: 2019-03-22
Payer: MEDICARE

## 2019-03-22 DIAGNOSIS — Z79.01 LONG TERM (CURRENT) USE OF ANTICOAGULANTS: Primary | ICD-10-CM

## 2019-03-22 LAB — INR PPP: 3.3 (ref 2–3)

## 2019-03-22 PROCEDURE — 99211 PR OFFICE/OUTPT VISIT, EST, LEVL I: ICD-10-PCS | Mod: 25,S$GLB,,

## 2019-03-22 PROCEDURE — 85610 PROTHROMBIN TIME: CPT | Mod: QW,S$GLB,, | Performed by: INTERNAL MEDICINE

## 2019-03-22 PROCEDURE — 85610 POCT INR: ICD-10-PCS | Mod: QW,S$GLB,, | Performed by: INTERNAL MEDICINE

## 2019-03-22 PROCEDURE — 99211 OFF/OP EST MAY X REQ PHY/QHP: CPT | Mod: 25,S$GLB,,

## 2019-03-22 NOTE — PROGRESS NOTES
Patient's INR is supra-therapeutic at 3.3. Doxycycline and colchicine started 3/4 (antibiotics completed).  No signs of bleeding noted; advised patient to seek immediate medical attention if he notices any abnormal bleeding.  Instructions given for patient to hold dose of coumadin on today; then resume current dose of 2mg on Tuesdays and 1mg on all other days of the week.  Patient voiced understanding.  Follow-up on 4/9/19.

## 2019-04-02 ENCOUNTER — TELEPHONE (OUTPATIENT)
Dept: CARDIOLOGY | Facility: CLINIC | Age: 64
End: 2019-04-02

## 2019-04-02 DIAGNOSIS — Z79.01 LONG TERM (CURRENT) USE OF ANTICOAGULANTS: ICD-10-CM

## 2019-04-02 RX ORDER — AMIODARONE HYDROCHLORIDE 200 MG/1
200 TABLET ORAL 2 TIMES DAILY
Qty: 120 TABLET | Refills: 3 | Status: SHIPPED | OUTPATIENT
Start: 2019-04-02 | End: 2019-06-27

## 2019-04-02 RX ORDER — WARFARIN 2 MG/1
TABLET ORAL
Qty: 30 TABLET | Refills: 3 | Status: SHIPPED | OUTPATIENT
Start: 2019-04-02 | End: 2020-01-01

## 2019-04-02 NOTE — TELEPHONE ENCOUNTER
Returned call. Patient stated need coumadin, pacerone and antibiotic refill.    Patient was informed will send refill for coumadin and pacerone, unable to authorized refill.    Instructed to see PCP for eval, regarding need for antibiotic.    Patient verbalized understanding.

## 2019-04-02 NOTE — TELEPHONE ENCOUNTER
----- Message from Saloni Mondragon sent at 4/2/2019 11:54 AM CDT -----  Contact: [facility]  Type:  Pharmacy Calling to Clarify an RX    Name of Caller:Lynn  Pharmacy Name:[facility]  Prescription Name:Coumandin 2mg, Antibiotic 100mg, paceron 200mg  What do they need to clarify?:pharmacy authoization  Best Call Back Number:[phone]  Additional Information: na

## 2019-04-02 NOTE — TELEPHONE ENCOUNTER
Approved Medications      warfarin (COUMADIN) 2 MG tablet         Sig: Take 1 tablet on Tuesdays and 1/2 tablet on all other day by mouth every evening as directed by coumadin clinic.    Disp:  30 tablet    Refills:  3    Start: 4/2/2019    Class: Normal    Authorized by: Chano Foreman MD    For: Long term (current) use of anticoagulants         amiodarone (PACERONE) 200 MG Tab         Sig: Take 1 tablet (200 mg total) by mouth 2 (two) times daily.    Disp:  120 tablet    Refills:  3    Start: 4/2/2019    Class: Normal    Authorized by: Chano Foreman MD        To be filled at: [id number], [place] - [address]        Patient notified.

## 2019-04-09 ENCOUNTER — ANTI-COAG VISIT (OUTPATIENT)
Dept: CARDIOLOGY | Facility: CLINIC | Age: 64
End: 2019-04-09
Payer: MEDICARE

## 2019-04-09 ENCOUNTER — CLINICAL SUPPORT (OUTPATIENT)
Dept: CARDIOLOGY | Facility: CLINIC | Age: 64
End: 2019-04-09
Payer: MEDICARE

## 2019-04-09 ENCOUNTER — OFFICE VISIT (OUTPATIENT)
Dept: CARDIOLOGY | Facility: CLINIC | Age: 64
End: 2019-04-09
Payer: MEDICARE

## 2019-04-09 VITALS
SYSTOLIC BLOOD PRESSURE: 186 MMHG | WEIGHT: 167.31 LBS | DIASTOLIC BLOOD PRESSURE: 86 MMHG | HEIGHT: 67 IN | BODY MASS INDEX: 26.26 KG/M2

## 2019-04-09 DIAGNOSIS — I48.91 ATRIAL FIBRILLATION, UNSPECIFIED TYPE: ICD-10-CM

## 2019-04-09 DIAGNOSIS — I42.8 CARDIOMYOPATHY, NONISCHEMIC: ICD-10-CM

## 2019-04-09 DIAGNOSIS — I48.21 ATRIAL FIBRILLATION, PERMANENT: ICD-10-CM

## 2019-04-09 DIAGNOSIS — Z79.01 LONG TERM (CURRENT) USE OF ANTICOAGULANTS: Primary | ICD-10-CM

## 2019-04-09 DIAGNOSIS — I50.42 CHRONIC COMBINED SYSTOLIC AND DIASTOLIC CHF, NYHA CLASS 1: Primary | ICD-10-CM

## 2019-04-09 DIAGNOSIS — I11.0 HYPERTENSIVE HEART DISEASE WITH CHRONIC COMBINED SYSTOLIC AND DIASTOLIC CONGESTIVE HEART FAILURE: Chronic | ICD-10-CM

## 2019-04-09 DIAGNOSIS — I50.42 HYPERTENSIVE HEART DISEASE WITH CHRONIC COMBINED SYSTOLIC AND DIASTOLIC CONGESTIVE HEART FAILURE: Chronic | ICD-10-CM

## 2019-04-09 LAB — INR PPP: 2.6 (ref 2–3)

## 2019-04-09 PROCEDURE — 3008F PR BODY MASS INDEX (BMI) DOCUMENTED: ICD-10-PCS | Mod: CPTII,S$GLB,, | Performed by: INTERNAL MEDICINE

## 2019-04-09 PROCEDURE — 99214 OFFICE O/P EST MOD 30 MIN: CPT | Mod: S$GLB,,, | Performed by: INTERNAL MEDICINE

## 2019-04-09 PROCEDURE — 99999 PR PBB SHADOW E&M-EST. PATIENT-LVL III: ICD-10-PCS | Mod: PBBFAC,,, | Performed by: INTERNAL MEDICINE

## 2019-04-09 PROCEDURE — 85610 PROTHROMBIN TIME: CPT | Mod: QW,S$GLB,, | Performed by: NUCLEAR MEDICINE

## 2019-04-09 PROCEDURE — 93000 ELECTROCARDIOGRAM COMPLETE: CPT | Mod: S$GLB,,, | Performed by: INTERNAL MEDICINE

## 2019-04-09 PROCEDURE — 99999 PR PBB SHADOW E&M-EST. PATIENT-LVL III: CPT | Mod: PBBFAC,,, | Performed by: INTERNAL MEDICINE

## 2019-04-09 PROCEDURE — 93000 EKG 12-LEAD: ICD-10-PCS | Mod: S$GLB,,, | Performed by: INTERNAL MEDICINE

## 2019-04-09 PROCEDURE — 85610 POCT INR: ICD-10-PCS | Mod: QW,S$GLB,, | Performed by: NUCLEAR MEDICINE

## 2019-04-09 PROCEDURE — 99214 PR OFFICE/OUTPT VISIT, EST, LEVL IV, 30-39 MIN: ICD-10-PCS | Mod: S$GLB,,, | Performed by: INTERNAL MEDICINE

## 2019-04-09 PROCEDURE — 3008F BODY MASS INDEX DOCD: CPT | Mod: CPTII,S$GLB,, | Performed by: INTERNAL MEDICINE

## 2019-04-09 NOTE — PROGRESS NOTES
Subjective:   Patient ID:  Ute Mcbride is a 63 y.o. male who presents for cardiac consult of Follow-up      HPI  The patient came in today for cardiac consult of Follow-up    Ute Mcbride is a 63 y.o. male  pt with NICM EF 20-25%, Chronic AF on coumadin, HTN, alc abuse, gout presents for CV follow up.     2/14/18  Pt said he was doing ok day prior to ED visit but after drinking some beer he felt more tired and started having more palpitations. He had been seen a few years ago by cardiology and had been doing well. He is compliant with meds but is going to run out soon and came to ED for evaluation. He was given IV cardizem as he was in AF RVR and felt fine after HR improved. ECG - AF RVR, trop negative, . Upon my exam in ED pt felt fine, HR and BP well controlled.  He had 2D ECHO which revealed LVEF 20% as in past with PH with PASP 62 mmHg along with moderate/severe RV dysfunction.   Was discharged from ED to follow up in clinic.     4/9/18  Pt presented to [facility] for PEGGY/DCCV on 3/22/18, pt had positive VERONICA thrombus with subtherapeutic INR prior to study. DCCV was not attempted and discharged to follow up at coumadin clinic. Recent INR supratherapeutic at 4.5. No bleeding issues. Pt walks every day in the morning about a mile, mild SOB but overall ok.     5/22/18  Pt had successful DCCV 4/26/18 after PEGGY revealed no VERONICA thrombus. He was started on amio 200 mg BID. PT feels well overall, no CP/SOB/palpitations. INR elevated today, no bleeding issues. Bp well controlled today.     8/15/18 unsuccessful pulmonary vein RF ablation.    1/8/19  Pt had attempt at AF ablation 8/2018 but went into resp failure during anesthesia and was transferred to ICU and restarted on CHF meds. For afib, continued on dig, amio, and coumadin.  He has not been on any meds past 3 days due to changing insurance company and just picked up meds yesterday AM.     4/9/19  INR today 2.6. BP elevated this AM has not taken meds yet.  Had rheum eval and has been started on colchicine for gout. Overall pt feels well.   ECG today - Afib V rate 62, RBBB, LPFB     Patient feels  no chest pain, no sob, no leg swelling, no PND, no palpitation, no dizziness, no syncope, no CNS symptoms.    Patient is compliant with medications.    2D ECHO 08/17/2018  CONCLUSIONS     1 - Upper limit of normal left ventricular enlargement.     2 - Severely depressed left ventricular systolic function (EF 20-25%).     3 - Concentric hypertrophy.     4 - Right ventricular enlargement with mildly depressed systolic function.     5 - Trivial pericardial effusion.     6 - Increased central venous pressure.     7 - LV echo density as per text , which is likely clinicallu insignificant.       Past Medical History:   Diagnosis Date    Anticoagulant long-term use     but has been noncompliant    Arthritis     Atrial fibrillation, chronic     Cardiomyopathy, nonischemic 11/9/2014    CHF (congestive heart failure)     Coronary artery disease     Encounter for blood transfusion     Gout     Hypertension        Past Surgical History:   Procedure Laterality Date    ABLATION N/A 8/15/2018    [facility] CATH LAB    ANKLE SURGERY      CARDIAC CATHETERIZATION      CARDIOVERSION N/A 4/26/2018    [facility] CATH LAB    CARDIOVERSION N/A 3/22/2018    [facility] CATH LAB    CATARACT EXTRACTION W/  INTRAOCULAR LENS IMPLANT Bilateral     COLONOSCOPY N/A 6/13/2014    Performed by Luis [facility] at [facility] ENDO    ECHOCARDIOGRAM,TRANSESOPHAGEAL N/A 8/15/2018    Performed by [facility] at [facility] CATH LAB    EGD N/A 6/13/2014    Performed by [facility] at [facility] ENDO    TRANSESOPHAGEAL ECHOCARDIOGRAM (PEGGY) N/A 4/26/2018    Performed by Chano [facility]man [facility] at [facility] CATH LAB    TRANSESOPHAGEAL ECHOCARDIOGRAM (PEGGY) N/A 3/22/2018    Performed by Chano [facility]man [facility] at [facility] CATH LAB        Social History     Tobacco Use    Smoking status: Former Smoker     Last attempt to quit: 1994     Years since quittin.2    Smokeless tobacco: Never Used   Substance Use Topics    Alcohol use: Yes     Alcohol/week: 1.2 oz     Types: 2 Cans of beer per week    Drug use: No       Family History   Problem Relation Age of Onset    Stroke Mother     Hypertension Mother     Hypertension Father        Patient's Medications   New Prescriptions    No medications on file   Previous Medications    ALLOPURINOL (ZYLOPRIM) 300 MG TABLET    Take 300 mg by mouth.    AMIODARONE (PACERONE) 200 MG TAB    Take 1 tablet (200 mg total) by mouth 2 (two) times daily.    CARVEDILOL (COREG) 6.25 MG TABLET    Take 1 tablet (6.25 mg total) by mouth 2 (two) times daily with meals.    COLCHICINE (MITIGARE) 0.6 MG CAP    Take 1 capsule (0.6 mg total) by mouth once daily.    COLCHICINE (MITIGARE) 0.6 MG CAP    Take 1 capsule (0.6 mg total) by mouth once daily.    DIGOXIN (LANOXIN) 250 MCG TABLET    Take 1 tablet (250 mcg total) by mouth once daily.    DOXYCYCLINE (VIBRAMYCIN) 100 MG CAP    Take 1 capsule (100 mg total) by mouth 2 (two) times daily.    FUROSEMIDE (LASIX) 40 MG TABLET    Take 1 tablet (40 mg total) by mouth 2 (two) times daily.    LISINOPRIL 10 MG TABLET    Take 1 tablet (10 mg total) by mouth once daily.    MUPIROCIN (BACTROBAN) 2 % OINTMENT    Apply topically 3 (three) times daily.    PANTOPRAZOLE (PROTONIX) 40 MG TABLET    Take 1 tablet (40 mg total) by mouth once daily.    POTASSIUM CHLORIDE SA (K-DUR,KLOR-CON, K-TAB) 20 MEQ TABLET    Take 20 mEq by mouth once daily.     WARFARIN (COUMADIN) 2 MG TABLET    Take 1 tablet on  and 1/2 tablet on all other day by mouth every evening as directed by coumadin clinic.   Modified Medications    No medications on file   Discontinued Medications    No medications on file       Review of Systems   Constitutional: Negative.    HENT: Negative.    Eyes: Negative.   "  Respiratory: Negative.    Cardiovascular: Negative.    Gastrointestinal: Negative.    Genitourinary: Negative.    Musculoskeletal: Negative.    Skin: Negative.    Neurological: Negative.    Endo/Heme/Allergies: Negative.    Psychiatric/Behavioral: Negative.    All 12 systems otherwise negative.      Wt Readings from Last 3 Encounters:   04/09/19 75.9 kg (167 lb 5.3 oz)   03/04/19 78.4 kg (172 lb 13.5 oz)   01/08/19 78.3 kg (172 lb 9.9 oz)     Temp Readings from Last 3 Encounters:   08/17/18 98.2 °F (36.8 °C)   04/26/18 97.8 °F (36.6 °C) (Oral)   03/22/18 97.9 °F (36.6 °C) (Oral)     BP Readings from Last 3 Encounters:   04/09/19 (!) 186/86   03/04/19 138/80   01/08/19 (!) 196/76     Pulse Readings from Last 3 Encounters:   03/04/19 67   01/08/19 76   08/17/18 62       BP (!) 186/86 (BP Location: Right arm, Patient Position: Sitting, BP Method: Medium (Manual))   Ht 5' 7" (1.702 m)   Wt 75.9 kg (167 lb 5.3 oz)   BMI 26.21 kg/m²     Objective:   Physical Exam   Constitutional: He is oriented to person, place, and time. He appears well-developed and well-nourished. No distress.   HENT:   Head: Normocephalic and atraumatic.   Nose: Nose normal.   Mouth/Throat: Oropharynx is clear and moist.   Eyes: Conjunctivae and EOM are normal. No scleral icterus.   Neck: Normal range of motion. Neck supple. No JVD present. No thyromegaly present.   Cardiovascular: Normal rate, S1 normal and S2 normal. An irregularly irregular rhythm present. Exam reveals no gallop, no S3, no S4 and no friction rub.   No murmur heard.  Pulmonary/Chest: Effort normal and breath sounds normal. No stridor. No respiratory distress. He has no wheezes. He has no rales. He exhibits no tenderness.   Abdominal: Soft. Bowel sounds are normal. He exhibits no distension and no mass. There is no tenderness. There is no rebound.   Genitourinary:   Genitourinary Comments: Deferred   Musculoskeletal: Normal range of motion. He exhibits deformity. He exhibits no " edema or tenderness.   Gout   Lymphadenopathy:     He has no cervical adenopathy.   Neurological: He is alert and oriented to person, place, and time. He exhibits normal muscle tone. Coordination normal.   Skin: Skin is warm and dry. Rash noted. He is not diaphoretic. No erythema. No pallor.   Psychiatric: He has a normal mood and affect. His behavior is normal. Judgment and thought content normal.   Nursing note and vitals reviewed.      Lab Results   Component Value Date     03/04/2019    K 4.0 03/04/2019     03/04/2019    CO2 30 (H) 03/04/2019    BUN 11 03/04/2019    CREATININE 0.8 03/04/2019     (H) 03/04/2019    HGBA1C 6.4 (H) 11/07/2014    MG 1.7 08/17/2018    AST 37 03/04/2019    ALT 35 03/04/2019    ALBUMIN 3.7 03/04/2019    PROT 8.9 (H) 03/04/2019    BILITOT 0.7 03/04/2019    WBC 6.13 03/04/2019    HGB 13.8 (L) 03/04/2019    HCT 43.5 03/04/2019    HCT 52 08/15/2018    MCV 93 03/04/2019     03/04/2019    INR 2.6 04/09/2019    INR 2.1 (H) 08/17/2018    TSH 4.36 (H) 01/13/2010     (H) 08/15/2018     Assessment:      1. Chronic combined systolic and diastolic CHF, NYHA class 1    2. Hypertensive heart disease with chronic combined systolic and diastolic congestive heart failure    3. Atrial fibrillation, permanent    4. Cardiomyopathy, nonischemic        Plan:   1. NICM EF 20-25% - pt euvolemic   - cont Coreg and lisinopril  - continue lasix and K supplement  - needs EP f/u for ICD - echo ordered     2. AF - s/p Unsuccessful pulmonary vein RF ablation.  - cont Coumadin and Coreg and Digoxin  - cont amio  - rate controlled    3. HTN - BP elevated again as he did not take meds today/didnt  refills  - cont meds, low salt diet   - check BP at home and report   - close follow up     Thank you for allowing me to participate in this patient's care. Please do not hesitate to contact me with any questions or concerns. Consult note has been forwarded to the referral physician.

## 2019-04-09 NOTE — PATIENT INSTRUCTIONS
Established High Blood Pressure    High blood pressure (hypertension) is a chronic disease. Often, healthcare providers dont know what causes it. But it can be caused by certain health conditions and medicines.  If you have high blood pressure, you may not have any symptoms. If you do have symptoms, they may include headache, dizziness, changes in your vision, chest pain, and shortness of breath. But even without symptoms, high blood pressure thats not treated raises your risk for heart attack and stroke. High blood pressure is a serious health risk and shouldnt be ignored.  A blood pressure reading is made up of two numbers: a higher number over a lower number. The top number is the systolic pressure. The bottom number is the diastolic pressure. A normal blood pressure is a systolic pressure of  less than 120 over a diastolic pressure of less than 80. You will see your blood pressure readings written together. For example, a person with a systolic pressure of 188 and a diastolic pressure of 78 will have 118/78 written in the medical record.  High blood pressure is when either the top number is 140 or higher, or the bottom number is 90 or higher. This must be the result when taking your blood pressure a number of times. The blood pressures between normal and high are called prehypertension.  Home care  If you have high blood pressure, you should do what is listed below to lower your blood pressure. If you are taking medicines for high blood pressure, these methods may reduce or end your need for medicines in the future.  · Begin a weight-loss program if you are overweight.  · Cut back on how much salt you get in your diet. Heres how to do this:  ¨ Dont eat foods that have a lot of salt. These include olives, pickles, smoked meats, and salted potato chips.  ¨ Dont add salt to your food at the table.  ¨ Use only small amounts of salt when cooking.  · Start an exercise program. Talk with your healthcare  provider about the type of exercise program that would be best for you. It doesn't have to be hard. Even brisk walking for 20 minutes 3 times a week is a good form of exercise.  · Dont take medicines that stimulate the heart. This includes many over-the-counter cold and sinus decongestant pills and sprays, as well as diet pills. Check the warnings about hypertension on the label. Before buying any over-the-counter medicines or supplements, always ask the pharmacist about the product's potential interaction with your high blood pressure and your high blood pressure medicines.  · Stimulants such as amphetamine or cocaine could be deadly for someone with high blood pressure. Never take these.  · Limit how much caffeine you get in your diet. Switch to caffeine-free products.  · Stop smoking. If you are a long-time smoker, this can be hard. Talk to your healthcare provider about medicines and nicotine replacement options to help you. Also, enroll in a stop-smoking program to make it more likely that you will quit for good.  · Learn how to handle stress. This is an important part of any program to lower blood pressure. Learn about relaxation methods like meditation, yoga, or biofeedback.  · If your provider prescribed medicines, take them exactly as directed. Missing doses may cause your blood pressure get out of control.  · If you miss a dose or doses, check with your healthcare provider or pharmacist about what to do.  · Consider buying an automatic blood pressure machine. Ask your provider for a recommendation. You can get one of these at most pharmacies.     The American Heart Association recommends the following guidelines for home blood pressure monitoring:  · Don't smoke or drink coffee for 30 minutes before taking your blood pressure.  · Go to the bathroom before the test.  · Relax for 5 minutes before taking the measurement.  · Sit with your back supported (don't sit on a couch or soft chair); keep your feet on  the floor uncrossed. Place your arm on a solid flat surface (like a table) with the upper part of the arm at heart level. Place the middle of the cuff directly above the eye of the elbow. Check the monitor's instruction manual for an illustration.  · Take multiple readings. When you measure, take 2 to 3 readings one minute apart and record all of the results.  · Take your blood pressure at the same time every day, or as your healthcare provider recommends.  · Record the date, time, and blood pressure reading.  · Take the record with you to your next medical appointment. If your blood pressure monitor has a built-in memory, simply take the monitor with you to your next appointment.  · Call your provider if you have several high readings. Don't be frightened by a single high blood pressure reading, but if you get several high readings, check in with your healthcare provider.  · Note: When blood pressure reaches a systolic (top number) of 180 or higher OR diastolic (bottom number) of 110 or higher, seek emergency medical treatment.  Follow-up care  You will need to see your healthcare provider regularly. This is to check your blood pressure and to make changes to your medicines. Make a follow-up appointment as directed. Bring the record of your home blood pressure readings to the appointment.  When to seek medical advice  Call your healthcare provider right away if any of these occur:  · Blood pressure reaches a systolic (upper number) of 180 or higher OR a diastolic (bottom number) of 110 or higher  · Chest pain or shortness of breath  · Severe headache  · Throbbing or rushing sound in the ears  · Nosebleed  · Sudden severe pain in your belly (abdomen)  · Extreme drowsiness, confusion, or fainting  · Dizziness or spinning sensation (vertigo)  · Weakness of an arm or leg or one side of the face  · You have problems speaking or seeing   Date Last Reviewed: 12/1/2016  © 9834-9465 [employer]. [address] [place] [address]  [place] [address]. All rights reserved. This information is not intended as a substitute for professional medical care. Always follow your healthcare professional's instructions.

## 2019-04-09 NOTE — PROGRESS NOTES
Patient's INR is therapeutic at 2.6.  Reports no recent changes.  Patient has taken medication as previously instructed.  Will maintain current dose of Warfarin 2 mg every Tuesday and 1 mg on all other days per week.  Dose calendar - given.  Recheck in 3 weeks.

## 2019-05-16 ENCOUNTER — OFFICE VISIT (OUTPATIENT)
Dept: CARDIOLOGY | Facility: CLINIC | Age: 64
End: 2019-05-16
Payer: MEDICARE

## 2019-05-16 ENCOUNTER — ANTI-COAG VISIT (OUTPATIENT)
Dept: CARDIOLOGY | Facility: CLINIC | Age: 64
End: 2019-05-16
Payer: MEDICARE

## 2019-05-16 VITALS
WEIGHT: 163.13 LBS | SYSTOLIC BLOOD PRESSURE: 200 MMHG | HEART RATE: 68 BPM | HEIGHT: 67 IN | BODY MASS INDEX: 25.6 KG/M2 | DIASTOLIC BLOOD PRESSURE: 90 MMHG

## 2019-05-16 DIAGNOSIS — I50.42 HYPERTENSIVE HEART DISEASE WITH CHRONIC COMBINED SYSTOLIC AND DIASTOLIC CONGESTIVE HEART FAILURE: Chronic | ICD-10-CM

## 2019-05-16 DIAGNOSIS — I42.8 CARDIOMYOPATHY, NONISCHEMIC: ICD-10-CM

## 2019-05-16 DIAGNOSIS — I48.21 ATRIAL FIBRILLATION, PERMANENT: ICD-10-CM

## 2019-05-16 DIAGNOSIS — Z79.01 CHRONIC ANTICOAGULATION: Chronic | ICD-10-CM

## 2019-05-16 DIAGNOSIS — Z79.01 LONG TERM (CURRENT) USE OF ANTICOAGULANTS: Primary | ICD-10-CM

## 2019-05-16 DIAGNOSIS — I11.0 HYPERTENSIVE CHF (CONGESTIVE HEART FAILURE): Chronic | ICD-10-CM

## 2019-05-16 DIAGNOSIS — I11.0 HYPERTENSIVE HEART DISEASE WITH CHRONIC COMBINED SYSTOLIC AND DIASTOLIC CONGESTIVE HEART FAILURE: Chronic | ICD-10-CM

## 2019-05-16 DIAGNOSIS — I50.23 ACUTE ON CHRONIC SYSTOLIC HF (HEART FAILURE): ICD-10-CM

## 2019-05-16 DIAGNOSIS — I50.42 CHRONIC COMBINED SYSTOLIC AND DIASTOLIC CHF, NYHA CLASS 1: Primary | ICD-10-CM

## 2019-05-16 PROBLEM — I48.0 PAROXYSMAL ATRIAL FIBRILLATION: Status: RESOLVED | Noted: 2018-08-15 | Resolved: 2019-05-16

## 2019-05-16 LAB — INR PPP: 2.2 (ref 2–3)

## 2019-05-16 PROCEDURE — 99999 PR PBB SHADOW E&M-EST. PATIENT-LVL III: CPT | Mod: PBBFAC,,, | Performed by: INTERNAL MEDICINE

## 2019-05-16 PROCEDURE — 99214 OFFICE O/P EST MOD 30 MIN: CPT | Mod: S$GLB,,, | Performed by: INTERNAL MEDICINE

## 2019-05-16 PROCEDURE — 3008F PR BODY MASS INDEX (BMI) DOCUMENTED: ICD-10-PCS | Mod: CPTII,S$GLB,, | Performed by: INTERNAL MEDICINE

## 2019-05-16 PROCEDURE — 85610 PROTHROMBIN TIME: CPT | Mod: QW,S$GLB,, | Performed by: INTERNAL MEDICINE

## 2019-05-16 PROCEDURE — 99214 PR OFFICE/OUTPT VISIT, EST, LEVL IV, 30-39 MIN: ICD-10-PCS | Mod: S$GLB,,, | Performed by: INTERNAL MEDICINE

## 2019-05-16 PROCEDURE — 3008F BODY MASS INDEX DOCD: CPT | Mod: CPTII,S$GLB,, | Performed by: INTERNAL MEDICINE

## 2019-05-16 PROCEDURE — 85610 POCT INR: ICD-10-PCS | Mod: QW,S$GLB,, | Performed by: INTERNAL MEDICINE

## 2019-05-16 PROCEDURE — 99999 PR PBB SHADOW E&M-EST. PATIENT-LVL III: ICD-10-PCS | Mod: PBBFAC,,, | Performed by: INTERNAL MEDICINE

## 2019-05-16 RX ORDER — LISINOPRIL 40 MG/1
40 TABLET ORAL DAILY
Qty: 30 TABLET | Refills: 3 | Status: SHIPPED | OUTPATIENT
Start: 2019-05-16 | End: 2020-01-14 | Stop reason: SDUPTHER

## 2019-05-16 NOTE — PROGRESS NOTES
Patient's INR is therapeutic at 2.2.  Reports no recent changes.  Instructed to maintain current dose of Warfarin 2 mg every Tuesday and 1 mg on all other days per week.  Dose calendar - given.  Recheck in 1 month.

## 2019-05-16 NOTE — PROGRESS NOTES
Subjective:   Patient ID:  Ute Mcbride is a 63 y.o. male who presents for cardiac consult of Hypertension (6 wk f/u); Atrial Fibrillation; and Congestive Heart Failure      HPI  The patient came in today for cardiac consult of Hypertension (6 wk f/u); Atrial Fibrillation; and Congestive Heart Failure    Ute Mcbride is a 63 y.o. male  pt with NICM EF 20-25%, Chronic AF on coumadin, HTN, alc abuse, gout presents for CV follow up.     2/14/18  Pt said he was doing ok day prior to ED visit but after drinking some beer he felt more tired and started having more palpitations. He had been seen a few years ago by cardiology and had been doing well. He is compliant with meds but is going to run out soon and came to ED for evaluation. He was given IV cardizem as he was in AF RVR and felt fine after HR improved. ECG - AF RVR, trop negative, . Upon my exam in ED pt felt fine, HR and BP well controlled.  He had 2D ECHO which revealed LVEF 20% as in past with PH with PASP 62 mmHg along with moderate/severe RV dysfunction.   Was discharged from ED to follow up in clinic.     4/9/18  Pt presented to [facility] for PEGGY/DCCV on 3/22/18, pt had positive VERONICA thrombus with subtherapeutic INR prior to study. DCCV was not attempted and discharged to follow up at coumadin clinic. Recent INR supratherapeutic at 4.5. No bleeding issues. Pt walks every day in the morning about a mile, mild SOB but overall ok.     5/22/18  Pt had successful DCCV 4/26/18 after PEGGY revealed no VERONICA thrombus. He was started on amio 200 mg BID. PT feels well overall, no CP/SOB/palpitations. INR elevated today, no bleeding issues. Bp well controlled today.     8/15/18 unsuccessful pulmonary vein RF ablation.    1/8/19  Pt had attempt at AF ablation 8/2018 but went into resp failure during anesthesia and was transferred to ICU and restarted on CHF meds. For afib, continued on dig, amio, and coumadin.  He has not been on any meds past 3 days due to  changing insurance company and just picked up meds yesterday AM.     4/9/19  INR today 2.6. BP elevated this AM has not taken meds yet. Had rheum eval and has been started on colchicine for gout. Overall pt feels well.   ECG today - Afib V rate 62, RBBB, LPFB     5/16/19  INR today 2.2. BP elevated, discussed will increase lisinipril to 40 mg. Had more salty food lately - Crawfish boil. Needs more compliance. No CP/SOB.     Patient feels  no chest pain, no sob, no leg swelling, no PND, no palpitation, no dizziness, no syncope, no CNS symptoms.    Patient is compliant with medications.    2D ECHO 08/17/2018  CONCLUSIONS     1 - Upper limit of normal left ventricular enlargement.     2 - Severely depressed left ventricular systolic function (EF 20-25%).     3 - Concentric hypertrophy.     4 - Right ventricular enlargement with mildly depressed systolic function.     5 - Trivial pericardial effusion.     6 - Increased central venous pressure.     7 - LV echo density as per text , which is likely clinicallu insignificant.       Past Medical History:   Diagnosis Date    Anticoagulant long-term use     but has been noncompliant    Arthritis     Atrial fibrillation, chronic     Cardiomyopathy, nonischemic 11/9/2014    CHF (congestive heart failure)     Coronary artery disease     Encounter for blood transfusion     Gout     Hypertension        Past Surgical History:   Procedure Laterality Date    ABLATION N/A 8/15/2018    [facility]    ANKLE SURGERY      CARDIAC CATHETERIZATION      CARDIOVERSION N/A 4/26/2018    [facility]    CARDIOVERSION N/A 3/22/2018    [facility] C[facility]    CATARACT EXTRACTION W/  INTRAOCULAR LENS IMPLANT Bilateral     COLONOSCOPY N/A 6/13/2014    [facility] ENDO    ECHOCARDIOGRAM,TRANSESOPHAGEAL N/A 8/15/2018    Performed [facility]    EGD N/A 2014    Performed by [facility]ney [facility] ENDO    TRANSESOPHAGEAL ECHOCARDIOGRAM (PEGGY) N/A 2018    Performed by [facility] CATH LAB    TRANSESOPHAGEAL ECHOCARDIOGRAM (PEGGY) N/A 3/22/2018    [facility] at [facility] CATH LAB       Social History     Tobacco Use    Smoking status: Former Smoker     Last attempt to quit: 1994     Years since quittin.3    Smokeless tobacco: Never Used   Substance Use Topics    Alcohol use: Yes     Alcohol/week: 1.2 oz     Types: 2 Cans of beer per week    Drug use: No       Family History   Problem Relation Age of Onset    Stroke Mother     Hypertension Mother     Hypertension Father        Patient's Medications   New Prescriptions    No medications on file   Previous Medications    ALLOPURINOL (ZYLOPRIM) 300 MG TABLET    Take 300 mg by mouth once daily.     AMIODARONE (PACERONE) 200 MG TAB    Take 1 tablet (200 mg total) by mouth 2 (two) times daily.    CARVEDILOL (COREG) 6.25 MG TABLET    Take 1 tablet (6.25 mg total) by mouth 2 (two) times daily with meals.    COLCHICINE (MITIGARE) 0.6 MG CAP    Take 1 capsule (0.6 mg total) by mouth once daily.    DIGOXIN (LANOXIN) 250 MCG TABLET    Take 1 tablet (250 mcg total) by mouth once daily.    FUROSEMIDE (LASIX) 40 MG TABLET    Take 1 tablet (40 mg total) by mouth 2 (two) times daily.    MUPIROCIN (BACTROBAN) 2 % OINTMENT    Apply topically 3 (three) times daily.    PANTOPRAZOLE (PROTONIX) 40 MG TABLET    Take 1 tablet (40 mg total) by mouth once daily.    WARFARIN (COUMADIN) 2 MG TABLET    Take 1 tablet on  and 1/2 tablet on all other day by mouth every evening as directed by coumadin clinic.   Modified Medications    Modified Medication Previous Medication    LISINOPRIL (PRINIVIL,ZESTRIL) 40 MG TABLET lisinopril 10 MG tablet       Take 1 tablet (40 mg total) by mouth once daily.    Take 1 tablet (10 mg total) by mouth once daily.  "  Discontinued Medications    COLCHICINE (MITIGARE) 0.6 MG CAP    Take 1 capsule (0.6 mg total) by mouth once daily.    DOXYCYCLINE (VIBRAMYCIN) 100 MG CAP    Take 1 capsule (100 mg total) by mouth 2 (two) times daily.    POTASSIUM CHLORIDE SA (K-DUR,KLOR-CON, K-TAB) 20 MEQ TABLET    Take 20 mEq by mouth once daily.        Review of Systems   Constitutional: Negative.    HENT: Negative.    Eyes: Negative.    Respiratory: Negative.    Cardiovascular: Negative.    Gastrointestinal: Negative.    Genitourinary: Negative.    Musculoskeletal: Negative.    Skin: Negative.    Neurological: Negative.    Endo/Heme/Allergies: Negative.    Psychiatric/Behavioral: Negative.    All 12 systems otherwise negative.      Wt Readings from Last 3 Encounters:   05/16/19 74 kg (163 lb 2.3 oz)   04/09/19 75.9 kg (167 lb 5.3 oz)   03/04/19 78.4 kg (172 lb 13.5 oz)     Temp Readings from Last 3 Encounters:   08/17/18 98.2 °F (36.8 °C)   04/26/18 97.8 °F (36.6 °C) (Oral)   03/22/18 97.9 °F (36.6 °C) (Oral)     BP Readings from Last 3 Encounters:   05/16/19 (!) 200/90   04/09/19 (!) 186/86   03/04/19 138/80     Pulse Readings from Last 3 Encounters:   05/16/19 68   03/04/19 67   01/08/19 76       BP (!) 200/90 (BP Method: Small (Manual))   Pulse 68   Ht 5' 7" (1.702 m)   Wt 74 kg (163 lb 2.3 oz)   BMI 25.55 kg/m²     Objective:   Physical Exam   Constitutional: He is oriented to person, place, and time. He appears well-developed and well-nourished. No distress.   HENT:   Head: Normocephalic and atraumatic.   Nose: Nose normal.   Mouth/Throat: Oropharynx is clear and moist.   Eyes: Conjunctivae and EOM are normal. No scleral icterus.   Neck: Normal range of motion. Neck supple. No JVD present. No thyromegaly present.   Cardiovascular: Normal rate, S1 normal and S2 normal. An irregularly irregular rhythm present. Exam reveals no gallop, no S3, no S4 and no friction rub.   No murmur heard.  Pulmonary/Chest: Effort normal and breath sounds " normal. No stridor. No respiratory distress. He has no wheezes. He has no rales. He exhibits no tenderness.   Abdominal: Soft. Bowel sounds are normal. He exhibits no distension and no mass. There is no tenderness. There is no rebound.   Genitourinary:   Genitourinary Comments: Deferred   Musculoskeletal: Normal range of motion. He exhibits deformity. He exhibits no edema or tenderness.   Gout   Lymphadenopathy:     He has no cervical adenopathy.   Neurological: He is alert and oriented to person, place, and time. He exhibits normal muscle tone. Coordination normal.   Skin: Skin is warm and dry. Rash noted. He is not diaphoretic. No erythema. No pallor.   Psychiatric: He has a normal mood and affect. His behavior is normal. Judgment and thought content normal.   Nursing note and vitals reviewed.      Lab Results   Component Value Date     03/04/2019    K 4.0 03/04/2019     03/04/2019    CO2 30 (H) 03/04/2019    BUN 11 03/04/2019    CREATININE 0.8 03/04/2019     (H) 03/04/2019    HGBA1C 6.4 (H) 11/07/2014    MG 1.7 08/17/2018    AST 37 03/04/2019    ALT 35 03/04/2019    ALBUMIN 3.7 03/04/2019    PROT 8.9 (H) 03/04/2019    BILITOT 0.7 03/04/2019    WBC 6.13 03/04/2019    HGB 13.8 (L) 03/04/2019    HCT 43.5 03/04/2019    HCT 52 08/15/2018    MCV 93 03/04/2019     03/04/2019    INR 2.2 05/16/2019    INR 2.1 (H) 08/17/2018    TSH 4.36 (H) 01/13/2010     (H) 08/15/2018     Assessment:      1. Chronic combined systolic and diastolic CHF, NYHA class 1    2. Cardiomyopathy, nonischemic    3. Hypertensive heart disease with chronic combined systolic and diastolic congestive heart failure    4. Atrial fibrillation, permanent    5. Hypertensive CHF (congestive heart failure)    6. Acute on chronic systolic HF (heart failure)        Plan:   1. NICM EF 20-25% - pt euvolemic   - cont Coreg and lisinopril  - continue lasix and K supplement  - needs EP f/u for ICD - echo ordered     2. AF - s/p  Unsuccessful pulmonary vein RF ablation.  - cont Coumadin and Coreg and Digoxin  - cont amio    3. HTN - BP elevated again as he did not take meds today/didnt  refills  - cont meds, low salt diet   - check BP at home and report   - close follow up   - increase Lisinopril to 40mg    Thank you for allowing me to participate in this patient's care. Please do not hesitate to contact me with any questions or concerns. Consult note has been forwarded to the referral physician.

## 2019-06-03 ENCOUNTER — OFFICE VISIT (OUTPATIENT)
Dept: RHEUMATOLOGY | Facility: CLINIC | Age: 64
End: 2019-06-03
Payer: MEDICARE

## 2019-06-03 ENCOUNTER — LAB VISIT (OUTPATIENT)
Dept: LAB | Facility: HOSPITAL | Age: 64
End: 2019-06-03
Attending: STUDENT IN AN ORGANIZED HEALTH CARE EDUCATION/TRAINING PROGRAM
Payer: MEDICARE

## 2019-06-03 VITALS
DIASTOLIC BLOOD PRESSURE: 88 MMHG | HEIGHT: 66 IN | BODY MASS INDEX: 26.24 KG/M2 | WEIGHT: 163.25 LBS | SYSTOLIC BLOOD PRESSURE: 160 MMHG | HEART RATE: 72 BPM

## 2019-06-03 DIAGNOSIS — M10.9 GOUT, UNSPECIFIED CAUSE, UNSPECIFIED CHRONICITY, UNSPECIFIED SITE: ICD-10-CM

## 2019-06-03 DIAGNOSIS — M10.9 GOUT, UNSPECIFIED CAUSE, UNSPECIFIED CHRONICITY, UNSPECIFIED SITE: Primary | ICD-10-CM

## 2019-06-03 LAB
ALBUMIN SERPL BCP-MCNC: 4 G/DL (ref 3.5–5.2)
ALP SERPL-CCNC: 80 U/L (ref 55–135)
ALT SERPL W/O P-5'-P-CCNC: 23 U/L (ref 10–44)
ANION GAP SERPL CALC-SCNC: 15 MMOL/L (ref 8–16)
AST SERPL-CCNC: 36 U/L (ref 10–40)
BASOPHILS # BLD AUTO: 0.04 K/UL (ref 0–0.2)
BASOPHILS NFR BLD: 0.7 % (ref 0–1.9)
BILIRUB SERPL-MCNC: 0.6 MG/DL (ref 0.1–1)
BUN SERPL-MCNC: 6 MG/DL (ref 8–23)
CALCIUM SERPL-MCNC: 9.5 MG/DL (ref 8.7–10.5)
CHLORIDE SERPL-SCNC: 97 MMOL/L (ref 95–110)
CO2 SERPL-SCNC: 24 MMOL/L (ref 23–29)
CREAT SERPL-MCNC: 0.8 MG/DL (ref 0.5–1.4)
CRP SERPL-MCNC: 19.8 MG/L (ref 0–8.2)
DIFFERENTIAL METHOD: ABNORMAL
EOSINOPHIL # BLD AUTO: 0.2 K/UL (ref 0–0.5)
EOSINOPHIL NFR BLD: 3.8 % (ref 0–8)
ERYTHROCYTE [DISTWIDTH] IN BLOOD BY AUTOMATED COUNT: 14.2 % (ref 11.5–14.5)
ERYTHROCYTE [SEDIMENTATION RATE] IN BLOOD BY WESTERGREN METHOD: 20 MM/HR (ref 0–10)
EST. GFR  (AFRICAN AMERICAN): >60 ML/MIN/1.73 M^2
EST. GFR  (NON AFRICAN AMERICAN): >60 ML/MIN/1.73 M^2
GLUCOSE SERPL-MCNC: 144 MG/DL (ref 70–110)
HCT VFR BLD AUTO: 45.7 % (ref 40–54)
HGB BLD-MCNC: 14.8 G/DL (ref 14–18)
LYMPHOCYTES # BLD AUTO: 1.3 K/UL (ref 1–4.8)
LYMPHOCYTES NFR BLD: 23.2 % (ref 18–48)
MCH RBC QN AUTO: 30.4 PG (ref 27–31)
MCHC RBC AUTO-ENTMCNC: 32.4 G/DL (ref 32–36)
MCV RBC AUTO: 94 FL (ref 82–98)
MONOCYTES # BLD AUTO: 0.5 K/UL (ref 0.3–1)
MONOCYTES NFR BLD: 9.7 % (ref 4–15)
NEUTROPHILS # BLD AUTO: 3.4 K/UL (ref 1.8–7.7)
NEUTROPHILS NFR BLD: 62.6 % (ref 38–73)
PLATELET # BLD AUTO: 182 K/UL (ref 150–350)
PMV BLD AUTO: 8.4 FL (ref 9.2–12.9)
POTASSIUM SERPL-SCNC: 3.9 MMOL/L (ref 3.5–5.1)
PROT SERPL-MCNC: 8.8 G/DL (ref 6–8.4)
RBC # BLD AUTO: 4.87 M/UL (ref 4.6–6.2)
SODIUM SERPL-SCNC: 136 MMOL/L (ref 136–145)
URATE SERPL-MCNC: 8.9 MG/DL (ref 3.4–7)
WBC # BLD AUTO: 5.47 K/UL (ref 3.9–12.7)

## 2019-06-03 PROCEDURE — 99999 PR PBB SHADOW E&M-EST. PATIENT-LVL III: CPT | Mod: PBBFAC,,, | Performed by: STUDENT IN AN ORGANIZED HEALTH CARE EDUCATION/TRAINING PROGRAM

## 2019-06-03 PROCEDURE — 3008F BODY MASS INDEX DOCD: CPT | Mod: CPTII,S$GLB,, | Performed by: STUDENT IN AN ORGANIZED HEALTH CARE EDUCATION/TRAINING PROGRAM

## 2019-06-03 PROCEDURE — 85025 COMPLETE CBC W/AUTO DIFF WBC: CPT

## 2019-06-03 PROCEDURE — 86140 C-REACTIVE PROTEIN: CPT

## 2019-06-03 PROCEDURE — 99999 PR PBB SHADOW E&M-EST. PATIENT-LVL III: ICD-10-PCS | Mod: PBBFAC,,, | Performed by: STUDENT IN AN ORGANIZED HEALTH CARE EDUCATION/TRAINING PROGRAM

## 2019-06-03 PROCEDURE — 96372 THER/PROPH/DIAG INJ SC/IM: CPT | Mod: S$GLB,,, | Performed by: STUDENT IN AN ORGANIZED HEALTH CARE EDUCATION/TRAINING PROGRAM

## 2019-06-03 PROCEDURE — 96372 PR INJECTION,THERAP/PROPH/DIAG2ST, IM OR SUBCUT: ICD-10-PCS | Mod: S$GLB,,, | Performed by: STUDENT IN AN ORGANIZED HEALTH CARE EDUCATION/TRAINING PROGRAM

## 2019-06-03 PROCEDURE — 85651 RBC SED RATE NONAUTOMATED: CPT

## 2019-06-03 PROCEDURE — 99214 PR OFFICE/OUTPT VISIT, EST, LEVL IV, 30-39 MIN: ICD-10-PCS | Mod: 25,S$GLB,, | Performed by: STUDENT IN AN ORGANIZED HEALTH CARE EDUCATION/TRAINING PROGRAM

## 2019-06-03 PROCEDURE — 99214 OFFICE O/P EST MOD 30 MIN: CPT | Mod: 25,S$GLB,, | Performed by: STUDENT IN AN ORGANIZED HEALTH CARE EDUCATION/TRAINING PROGRAM

## 2019-06-03 PROCEDURE — 84550 ASSAY OF BLOOD/URIC ACID: CPT

## 2019-06-03 PROCEDURE — 36415 COLL VENOUS BLD VENIPUNCTURE: CPT

## 2019-06-03 PROCEDURE — 80053 COMPREHEN METABOLIC PANEL: CPT

## 2019-06-03 PROCEDURE — 3008F PR BODY MASS INDEX (BMI) DOCUMENTED: ICD-10-PCS | Mod: CPTII,S$GLB,, | Performed by: STUDENT IN AN ORGANIZED HEALTH CARE EDUCATION/TRAINING PROGRAM

## 2019-06-03 RX ORDER — ALLOPURINOL 300 MG/1
300 TABLET ORAL DAILY
Qty: 30 TABLET | Refills: 6 | Status: SHIPPED | OUTPATIENT
Start: 2019-06-03 | End: 2019-06-27 | Stop reason: SDUPTHER

## 2019-06-03 RX ORDER — COLCHICINE 0.6 MG/1
0.6 TABLET ORAL DAILY
Qty: 90 TABLET | Refills: 0 | Status: SHIPPED | OUTPATIENT
Start: 2019-06-03 | End: 2019-06-10

## 2019-06-03 RX ORDER — BETAMETHASONE SODIUM PHOSPHATE AND BETAMETHASONE ACETATE 3; 3 MG/ML; MG/ML
6 INJECTION, SUSPENSION INTRA-ARTICULAR; INTRALESIONAL; INTRAMUSCULAR; SOFT TISSUE ONCE
Status: COMPLETED | OUTPATIENT
Start: 2019-06-03 | End: 2019-06-03

## 2019-06-03 RX ORDER — PREDNISONE 5 MG/1
5 TABLET ORAL DAILY
Qty: 30 TABLET | Refills: 1 | Status: SHIPPED | OUTPATIENT
Start: 2019-06-03 | End: 2019-06-13

## 2019-06-03 RX ORDER — ALLOPURINOL 100 MG/1
100 TABLET ORAL DAILY
Qty: 30 TABLET | Refills: 6 | Status: SHIPPED | OUTPATIENT
Start: 2019-06-03 | End: 2019-11-13

## 2019-06-03 RX ADMIN — BETAMETHASONE SODIUM PHOSPHATE AND BETAMETHASONE ACETATE 6 MG: 3; 3 INJECTION, SUSPENSION INTRA-ARTICULAR; INTRALESIONAL; INTRAMUSCULAR; SOFT TISSUE at 12:06

## 2019-06-03 NOTE — LETTER
Patricia 10, 2019      Chano Foreman MD  [id number] [facility]  [place] [id number]           [facility]  [address] [place] [address] [address] [place] [address]  Phone: [phone]  Fax: [phone]          Patient: Ute Mcbride   MR Number: [id number]   YOB: 1955   Date of Visit: 6/3/2019       Dear Dr. Chano Foreman:    Thank you for referring Ute Mcbride to me for evaluation. Attached you will find relevant portions of my assessment and plan of care.    If you have questions, please do not hesitate to call me. I look forward to following Ute Mcbride along with you.    Sincerely,    Mackenzie Overton MD    Enclosure  CC:  No Recipients    If you would like to receive this communication electronically, please contact [email or web address] or [phone] to request more information on [employer] Link access.    For providers and/or their staff who would like to refer a patient to [facility], please contact us through our one-stop-shop provider referral line, [facility], at [phone].    If you feel you have received this communication in error or would no longer like to receive these types of communications, please e-mail [email or web address]

## 2019-06-03 NOTE — PROGRESS NOTES
Administered 1 cc Betamethasone  6mg/cc to Right Ventrogluteal. Pt tolerated well. No acute reaction noted to site. Pt instructed on S/S to report. Pt verbalized understanding. Patient waited 15 minutes post injection    Lot:[id number]  Exp:3/27/2020  Manu:[employer]

## 2019-06-10 NOTE — PROGRESS NOTES
RHEUMATOLOGY OUTPATIENT CLINIC NOTE        Attending Rheumatologist: Mackenzie Overton  Primary Care Provider: Kenneth Redding MD   Physician Requesting Consultation: Chano Foreman MD  [id number] [facility] [place] [address]  Chief Complaint/Reason For Consultation:  Gout      Subjective:       HPI  Historical:  Ute Mcbride is a 64 y.o. White male presents for initial evaluation of chronic tophaceous gout. Reports 10+year hx. Does not recall age of onset or joint involvement. Denies previous hx diagnostic arthrocentesis, reports dx made via clinical hx and presentation. Reports compliance w urate lowering therapy for past few years, currently on allopurinol 300mg daily. Denies any other gout medications previously. No hx colchicine. Does not recall last flare. States he does not get them too often anymore, but with chronic pain in hands, wrists, and elbows. Main complaint is extruding tophi on hands and elbows. No fevers.  No other acute issues or complaints.     Today:  Pt states that since last visit, has had 1 flare in b/l hands. Taking allopurinol 300mg daily. Reports strict compliance. Unable to get colchicine as discussed last visit 2.2 cost. Main complaint today is persistent chronic diffuse hand pain 7-8/10.   14pt ros negative except as otherwise stated above       Review of Systems   Constitutional: Negative for chills, fever and malaise/fatigue.   HENT: Negative for congestion, hearing loss and sore throat.    Eyes: Negative for blurred vision, photophobia and redness.   Respiratory: Negative for cough, hemoptysis and shortness of breath.    Cardiovascular: Negative for chest pain, orthopnea and leg swelling.   Gastrointestinal: Negative for abdominal pain, heartburn and vomiting.   Genitourinary: Negative for dysuria and frequency.   Musculoskeletal: Positive for joint pain. Negative for back pain, falls and myalgias.   Skin: Negative for itching and rash.   Neurological: Negative for  dizziness, tingling and sensory change.   Endo/Heme/Allergies: Negative for polydipsia. Does not bruise/bleed easily.   Psychiatric/Behavioral: Negative for depression and substance abuse.       Chronic comorbid conditions affecting medical decision making today:  Past Medical History:   Diagnosis Date    Anticoagulant long-term use     but has been noncompliant    Arthritis     Atrial fibrillation, chronic     Cardiomyopathy, nonischemic 2014    CHF (congestive heart failure)     Coronary artery disease     Encounter for blood transfusion     Gout     Hypertension      Past Surgical History:   Procedure Laterality Date    ABLATION N/A 8/15/2018    Performed [facility] CATH LAB    ANKLE SURGERY      CARDIAC CATHETERIZATION      CARDIOVERSION N/A 2018    Performed by [facility]man [facility] at [facility] CATH LAB    CARDIOVERSION N/A 3/22/2018    Performed by [facility] Foreman [facility] at [facility] CATH LAB    CATARACT EXTRACTION W/  INTRAOCULAR LENS IMPLANT Bilateral     COLONOSCOPY N/A 2014    Performed by Luis Rodney MD at [facility] ENDO    ECHOCARDIOGRAM,TRANSESOPHAGEAL N/A 8/15/2018    Performed by [facility] CATH LAB    EGD N/A 2014    Performed by Luis Rodney MD at [facility] ENDO    TRANSESOPHAGEAL ECHOCARDIOGRAM (PEGGY) N/A 2018    Performed by Chano Foreman MD at [facility] CATH LAB    TRANSESOPHAGEAL ECHOCARDIOGRAM (PEGGY) N/A 3/22/2018    Performed by Ch[facility] Foreman [facility] at [facility] CATH LAB     Family History   Problem Relation Age of Onset    Stroke Mother     Hypertension Mother     Hypertension Father      Social History     Substance and Sexual Activity   Alcohol Use Yes    Alcohol/week: 1.2 oz    Types: 2 Cans of beer per week     Social History     Tobacco Use   Smoking Status Former Smoker    Last attempt to quit: 1994    Years since quittin.4   Smokeless Tobacco Never Used     Social History     Substance and Sexual Activity   Drug Use  No       Current Outpatient Medications:     allopurinol (ZYLOPRIM) 300 MG tablet, Take 300 mg by mouth once daily. , Disp: , Rfl:     amiodarone (PACERONE) 200 MG Tab, Take 1 tablet (200 mg total) by mouth 2 (two) times daily., Disp: 120 tablet, Rfl: 3    carvedilol (COREG) 6.25 MG tablet, Take 1 tablet (6.25 mg total) by mouth 2 (two) times daily with meals., Disp: 60 tablet, Rfl: 1    colchicine (MITIGARE) 0.6 mg Cap, Take 1 capsule (0.6 mg total) by mouth once daily., Disp: 30 capsule, Rfl: 5    digoxin (LANOXIN) 250 mcg tablet, Take 1 tablet (250 mcg total) by mouth once daily., Disp: 90 tablet, Rfl: 1    furosemide (LASIX) 40 MG tablet, Take 1 tablet (40 mg total) by mouth 2 (two) times daily., Disp: 60 tablet, Rfl: 11    lisinopril (PRINIVIL,ZESTRIL) 40 MG tablet, Take 1 tablet (40 mg total) by mouth once daily., Disp: 30 tablet, Rfl: 3    mupirocin (BACTROBAN) 2 % ointment, Apply topically 3 (three) times daily., Disp: 1 Tube, Rfl: 3    pantoprazole (PROTONIX) 40 MG tablet, Take 1 tablet (40 mg total) by mouth once daily., Disp: 90 tablet, Rfl: 3    warfarin (COUMADIN) 2 MG tablet, Take 1 tablet on Tuesdays and 1/2 tablet on all other day by mouth every evening as directed by coumadin clinic., Disp: 30 tablet, Rfl: 3    allopurinol (ZYLOPRIM) 100 MG tablet, Take 1 tablet (100 mg total) by mouth once daily., Disp: 30 tablet, Rfl: 6    allopurinol (ZYLOPRIM) 300 MG tablet, Take 1 tablet (300 mg total) by mouth once daily., Disp: 30 tablet, Rfl: 6    colchicine (COLCRYS) 0.6 mg tablet, Take 1 tablet (0.6 mg total) by mouth once daily., Disp: 90 tablet, Rfl: 0    predniSONE (DELTASONE) 5 MG tablet, Take 1 tablet (5 mg total) by mouth once daily. for 10 days, Disp: 30 tablet, Rfl: 1       Objective:         Vitals:    06/03/19 1126   BP: (!) 160/88   Pulse: 72      Physical Exam   Nursing note and vitals reviewed.  Constitutional:   oriented to person, place, and time and well-developed,  well-nourished, and in no distress. No distress.   HENT:   Head: Normocephalic and atraumatic.   Right Ear: External ear normal.   Left Ear: External ear normal.   Eyes: Conjunctivae and EOM are normal. Pupils are equal, round, and reactive to light.   Neck: Normal range of motion. Neck supple.   Cardiovascular: Normal rate, regular rhythm and normal heart sounds.    Pulmonary/Chest: Effort normal and breath sounds normal. No respiratory distress.  no wheezes.   Abdominal: Soft. Bowel sounds are normal.   Neurological:   alert and oriented to person, place, and time.   Skin: Skin is warm and dry. No rash noted.     Psychiatric: Affect and judgment normal.   Musculoskeletal: Normal range of motion.   exhibits tenderness. Severe tophi to bilateral hands /elbows w multiple extruding tophi. +R olecranon bursitis.         Reviewed old and all outside pertinent medical records available.    All lab results personally reviewed and interpreted by me.  Lab Results   Component Value Date    WBC 5.47 06/03/2019    HGB 14.8 06/03/2019    HCT 45.7 06/03/2019    MCV 94 06/03/2019    MCH 30.4 06/03/2019    MCHC 32.4 06/03/2019    RDW 14.2 06/03/2019     06/03/2019    MPV 8.4 (L) 06/03/2019    PLTEST Appears normal 06/11/2014       Lab Results   Component Value Date     06/03/2019    K 3.9 06/03/2019    CL 97 06/03/2019    CO2 24 06/03/2019     (H) 06/03/2019    BUN 6 (L) 06/03/2019    CALCIUM 9.5 06/03/2019    PROT 8.8 (H) 06/03/2019    ALBUMIN 4.0 06/03/2019    BILITOT 0.6 06/03/2019    AST 36 06/03/2019    ALKPHOS 80 06/03/2019    ALT 23 06/03/2019       Lab Results   Component Value Date    COLORU Yellow 11/07/2014    APPEARANCEUA Clear 11/07/2014    SPECGRAV 1.010 11/07/2014    PHUR 6.0 11/07/2014    PROTEINUA Negative 11/07/2014    KETONESU Negative 11/07/2014    LEUKOCYTESUR Negative 11/07/2014    NITRITE Negative 11/07/2014    UROBILINOGEN Negative 11/07/2014       Lab Results   Component Value Date     CRP 19.8 (H) 06/03/2019       Lab Results   Component Value Date    SEDRATE 20 (H) 06/03/2019       Lab Results   Component Value Date    SEDRATE 20 (H) 06/03/2019       No components found for: 25OHVITDTOT, 74PJMWBA9, 79PRFBPE4, METHODNOTE    Lab Results   Component Value Date    URICACID 8.9 (H) 06/03/2019       No components found for: TSPOTTB       ASSESSMENT / PLAN:     Ute Mcbride is a 64 y.o. White male with:      #Chronic tophaceous gout  -Severe tophi burden; multiple extruding tophi  -15+year hx gout; Pt reports joint involvement mainly in hands and elbows. Last flare several weeks ago and c/o chronic progressive pain in hands  -Previous hx urate lowering therapy only w allopurinol. Reports compliance daily for past 2 years. Unable to start colchicine 2.2 cost  -Last uric acid 8.7 ; goal uric acid <5; not at goal  -Will increase allopurinol to 400mg daily. Start low dose prednisone 5mg daily for flare ppx while escalating urate lowering therapy. Nsaids contraindicated 2. Cardiac hx / chf. Concern for drug interaction w/ colchicine /amiodarone/dig. Discussed risks/benefits of medications including increased risk of infection, fluid retention. Pt advised to stop prednisone immediately should he have any worsening sob/orthopnea/ LE edema and instructed to contact clinic.  -Counseled on dietary and lifestyle changes. Limit triggers (beer and seafood). -Discussed risks/benefits of krystexxa, given need for rapid improvement in   flare and tophus reduction, function. Will re-visit at rtc        . Other specified counseling  - Nutrition and exercise counseling  - Medication counseling provided        Method of contact with patient concerns: Александр attn Rheumatology      Mackenzie Overton M.D.  Rheumatology Department   [facility] - [address] [address]  Phone: [phone]  Fax: [phone]

## 2019-06-18 ENCOUNTER — ANTI-COAG VISIT (OUTPATIENT)
Dept: CARDIOLOGY | Facility: CLINIC | Age: 64
End: 2019-06-18
Payer: MEDICARE

## 2019-06-18 DIAGNOSIS — Z79.01 LONG TERM (CURRENT) USE OF ANTICOAGULANTS: Primary | ICD-10-CM

## 2019-06-18 DIAGNOSIS — I48.21 ATRIAL FIBRILLATION, PERMANENT: ICD-10-CM

## 2019-06-18 LAB — INR PPP: 1.9 (ref 2–3)

## 2019-06-18 PROCEDURE — 85610 POCT INR: ICD-10-PCS | Mod: QW,S$GLB,, | Performed by: NUCLEAR MEDICINE

## 2019-06-18 PROCEDURE — 93793 PR ANTICOAGULANT MGMT FOR PT TAKING WARFARIN: ICD-10-PCS | Mod: S$GLB,,,

## 2019-06-18 PROCEDURE — 93793 ANTICOAG MGMT PT WARFARIN: CPT | Mod: S$GLB,,,

## 2019-06-18 PROCEDURE — 85610 PROTHROMBIN TIME: CPT | Mod: QW,S$GLB,, | Performed by: NUCLEAR MEDICINE

## 2019-06-18 NOTE — PROGRESS NOTES
Patient's INR is slightly sub-therapeutic at 1.9.  Mr. Mcbride reports missing 2 doses of warfarin this weekend while away from home.  Advised patient of importance of bringing medications when he will be away from home.   No boosted dose given on today as patient was started on prednisone 5mg daily and dose of allopurinol was increased on 6/3.  Instructions given for patient to resume current dose of 2mg on Tuesdays and 1mg on all other days of the week.  Patient voiced understanding.  Follow-up in 1 week.

## 2019-06-27 ENCOUNTER — ANTI-COAG VISIT (OUTPATIENT)
Dept: CARDIOLOGY | Facility: CLINIC | Age: 64
End: 2019-06-27
Payer: MEDICARE

## 2019-06-27 ENCOUNTER — OFFICE VISIT (OUTPATIENT)
Dept: CARDIOLOGY | Facility: CLINIC | Age: 64
End: 2019-06-27
Payer: MEDICARE

## 2019-06-27 VITALS
DIASTOLIC BLOOD PRESSURE: 90 MMHG | SYSTOLIC BLOOD PRESSURE: 198 MMHG | WEIGHT: 159.38 LBS | HEIGHT: 66 IN | BODY MASS INDEX: 25.61 KG/M2 | HEART RATE: 56 BPM

## 2019-06-27 DIAGNOSIS — I42.8 CARDIOMYOPATHY, NONISCHEMIC: ICD-10-CM

## 2019-06-27 DIAGNOSIS — I48.21 ATRIAL FIBRILLATION, PERMANENT: ICD-10-CM

## 2019-06-27 DIAGNOSIS — Z79.01 CHRONIC ANTICOAGULATION: Chronic | ICD-10-CM

## 2019-06-27 DIAGNOSIS — Z79.01 LONG TERM (CURRENT) USE OF ANTICOAGULANTS: Primary | ICD-10-CM

## 2019-06-27 DIAGNOSIS — I50.42 HYPERTENSIVE HEART DISEASE WITH CHRONIC COMBINED SYSTOLIC AND DIASTOLIC CONGESTIVE HEART FAILURE: Primary | Chronic | ICD-10-CM

## 2019-06-27 DIAGNOSIS — I11.0 HYPERTENSIVE HEART DISEASE WITH CHRONIC COMBINED SYSTOLIC AND DIASTOLIC CONGESTIVE HEART FAILURE: Primary | Chronic | ICD-10-CM

## 2019-06-27 DIAGNOSIS — I50.42 CHRONIC COMBINED SYSTOLIC AND DIASTOLIC CHF, NYHA CLASS 1: ICD-10-CM

## 2019-06-27 LAB — INR PPP: 1.6 (ref 2–3)

## 2019-06-27 PROCEDURE — 3008F BODY MASS INDEX DOCD: CPT | Mod: CPTII,S$GLB,, | Performed by: INTERNAL MEDICINE

## 2019-06-27 PROCEDURE — 3008F PR BODY MASS INDEX (BMI) DOCUMENTED: ICD-10-PCS | Mod: CPTII,S$GLB,, | Performed by: INTERNAL MEDICINE

## 2019-06-27 PROCEDURE — 85610 POCT INR: ICD-10-PCS | Mod: QW,S$GLB,, | Performed by: NUCLEAR MEDICINE

## 2019-06-27 PROCEDURE — 99999 PR PBB SHADOW E&M-EST. PATIENT-LVL III: CPT | Mod: PBBFAC,,, | Performed by: INTERNAL MEDICINE

## 2019-06-27 PROCEDURE — 99215 PR OFFICE/OUTPT VISIT, EST, LEVL V, 40-54 MIN: ICD-10-PCS | Mod: S$GLB,,, | Performed by: INTERNAL MEDICINE

## 2019-06-27 PROCEDURE — 85610 PROTHROMBIN TIME: CPT | Mod: QW,S$GLB,, | Performed by: NUCLEAR MEDICINE

## 2019-06-27 PROCEDURE — 99999 PR PBB SHADOW E&M-EST. PATIENT-LVL III: ICD-10-PCS | Mod: PBBFAC,,, | Performed by: INTERNAL MEDICINE

## 2019-06-27 PROCEDURE — 99215 OFFICE O/P EST HI 40 MIN: CPT | Mod: S$GLB,,, | Performed by: INTERNAL MEDICINE

## 2019-06-27 RX ORDER — AMIODARONE HYDROCHLORIDE 200 MG/1
200 TABLET ORAL DAILY
Qty: 120 TABLET | Refills: 3 | Status: SHIPPED | OUTPATIENT
Start: 2019-06-27 | End: 2020-01-01 | Stop reason: SDUPTHER

## 2019-06-27 RX ORDER — ISOSORBIDE MONONITRATE 60 MG/1
60 TABLET, EXTENDED RELEASE ORAL DAILY
Qty: 30 TABLET | Refills: 11 | Status: SHIPPED | OUTPATIENT
Start: 2019-06-27 | End: 2020-01-01 | Stop reason: SDUPTHER

## 2019-06-27 NOTE — PROGRESS NOTES
Patient's INR is sub-therapeutic at 1.6.  Prednisone has been completed.  Mr. Mcbride also reports he hasn't had any beers lately.  Interactions discussed.   No abnormal numbness or weakness noted.  If INR continues to be low at the next visit, dose will be increased.  Instructions given for patient to take warfarin 2mg on today; then resume current dose of 2mg on Tuesdays and 1mg on all other days of the week.  Patient voiced understanding.  Follow-up in 2 weeks.

## 2019-07-08 ENCOUNTER — TELEPHONE (OUTPATIENT)
Dept: RHEUMATOLOGY | Facility: CLINIC | Age: 64
End: 2019-07-08

## 2019-07-08 NOTE — TELEPHONE ENCOUNTER
Spoke with  Reed regarding missed appointment today with Dr. Overton. Mr. Mcbride stated that his wife is in the hospital and would like to reschedule his appointment to next Monday 7/15/2019 @ 9am

## 2019-07-15 ENCOUNTER — TELEPHONE (OUTPATIENT)
Dept: RHEUMATOLOGY | Facility: CLINIC | Age: 64
End: 2019-07-15

## 2019-07-15 NOTE — TELEPHONE ENCOUNTER
Left voice message stating to return call to clinic regarding missed appointment with Dr. Overton for today

## 2019-07-18 ENCOUNTER — ANTI-COAG VISIT (OUTPATIENT)
Dept: CARDIOLOGY | Facility: CLINIC | Age: 64
End: 2019-07-18
Payer: MEDICARE

## 2019-07-18 DIAGNOSIS — I48.21 ATRIAL FIBRILLATION, PERMANENT: ICD-10-CM

## 2019-07-18 DIAGNOSIS — Z79.01 LONG TERM (CURRENT) USE OF ANTICOAGULANTS: Primary | ICD-10-CM

## 2019-07-18 LAB — INR PPP: 1.8 (ref 2–3)

## 2019-07-18 PROCEDURE — 85610 POCT INR: ICD-10-PCS | Mod: QW,S$GLB,, | Performed by: INTERNAL MEDICINE

## 2019-07-18 PROCEDURE — 93793 ANTICOAG MGMT PT WARFARIN: CPT | Mod: S$GLB,,,

## 2019-07-18 PROCEDURE — 85610 PROTHROMBIN TIME: CPT | Mod: QW,S$GLB,, | Performed by: INTERNAL MEDICINE

## 2019-07-18 PROCEDURE — 93793 PR ANTICOAGULANT MGMT FOR PT TAKING WARFARIN: ICD-10-PCS | Mod: S$GLB,,,

## 2019-07-18 NOTE — PROGRESS NOTES
Patient's INR remains sub-therapeutic at 1.8.  Mr. Mcbride Amiodarone dose was decreased to 200 mg daily on 6/27/19 (interactions discussed) and Imdur 60mg daily started.  No abnormal numbness or weakness noted.  Instructions given for patient to increase dose of warfarin to 2mg on Tuesdays, Thursdays and 1mg on all other days of the week.  Patient voiced understanding.  Follow-up in 2 weeks.

## 2019-08-01 ENCOUNTER — ANTI-COAG VISIT (OUTPATIENT)
Dept: CARDIOLOGY | Facility: CLINIC | Age: 64
End: 2019-08-01
Payer: MEDICARE

## 2019-08-01 ENCOUNTER — LAB VISIT (OUTPATIENT)
Dept: LAB | Facility: HOSPITAL | Age: 64
End: 2019-08-01
Attending: INTERNAL MEDICINE
Payer: MEDICARE

## 2019-08-01 DIAGNOSIS — I48.21 ATRIAL FIBRILLATION, PERMANENT: ICD-10-CM

## 2019-08-01 DIAGNOSIS — Z79.01 LONG TERM (CURRENT) USE OF ANTICOAGULANTS: Primary | ICD-10-CM

## 2019-08-01 LAB — INR PPP: 2.6 (ref 2–3)

## 2019-08-01 PROCEDURE — 85610 POCT INR: ICD-10-PCS | Mod: QW,S$GLB,, | Performed by: INTERNAL MEDICINE

## 2019-08-01 PROCEDURE — 80299 QUANTITATIVE ASSAY DRUG: CPT

## 2019-08-01 PROCEDURE — 36415 COLL VENOUS BLD VENIPUNCTURE: CPT

## 2019-08-01 PROCEDURE — 93793 ANTICOAG MGMT PT WARFARIN: CPT | Mod: S$GLB,,,

## 2019-08-01 PROCEDURE — 85610 PROTHROMBIN TIME: CPT | Mod: QW,S$GLB,, | Performed by: INTERNAL MEDICINE

## 2019-08-01 PROCEDURE — 93793 PR ANTICOAGULANT MGMT FOR PT TAKING WARFARIN: ICD-10-PCS | Mod: S$GLB,,,

## 2019-08-01 NOTE — PROGRESS NOTES
Patient's INR is therapeutic at 2.6.  Reports no recent changes.  Instructed to maintain current dose of Warfarin 2 mg every Tuesday and Thursday; and 1 mg on all other days per week.  Dose calendar given and reviewed with patient.  Recheck in 2 weeks.

## 2019-08-05 LAB
AMIODARONE SERPL-SCNC: 2.4 UG/ML (ref 1–3)
N-DESETHYL-AMIODARONE: 1.4 UG/ML

## 2019-08-15 ENCOUNTER — ANTI-COAG VISIT (OUTPATIENT)
Dept: CARDIOLOGY | Facility: CLINIC | Age: 64
End: 2019-08-15
Payer: MEDICARE

## 2019-08-15 DIAGNOSIS — I48.21 ATRIAL FIBRILLATION, PERMANENT: ICD-10-CM

## 2019-08-15 DIAGNOSIS — Z79.01 LONG TERM (CURRENT) USE OF ANTICOAGULANTS: Primary | ICD-10-CM

## 2019-08-15 LAB — INR PPP: 1.9 (ref 2–3)

## 2019-08-15 PROCEDURE — 85610 POCT INR: ICD-10-PCS | Mod: QW,S$GLB,, | Performed by: INTERNAL MEDICINE

## 2019-08-15 PROCEDURE — 85610 PROTHROMBIN TIME: CPT | Mod: QW,S$GLB,, | Performed by: INTERNAL MEDICINE

## 2019-08-15 PROCEDURE — 93793 PR ANTICOAGULANT MGMT FOR PT TAKING WARFARIN: ICD-10-PCS | Mod: S$GLB,,,

## 2019-08-15 PROCEDURE — 93793 ANTICOAG MGMT PT WARFARIN: CPT | Mod: S$GLB,,,

## 2019-08-15 NOTE — PROGRESS NOTES
Patient's INR is sub-therapeutic at 1.9.  Mr. Mcbride states he ate a serving of broccoli and Kodkod this week. Consistency discussed.  No abnormal numbness or weakness noted.  Instructions given for patient to take warfarin 3mg on today; then resume current dose of 2mg on Tuesdays, Thursdays and 1mg on all other days of the week.  Patient voiced understanding.  Follow-up in 2 weeks.

## 2019-09-05 ENCOUNTER — ANTI-COAG VISIT (OUTPATIENT)
Dept: CARDIOLOGY | Facility: CLINIC | Age: 64
End: 2019-09-05
Payer: MEDICARE

## 2019-09-05 DIAGNOSIS — Z79.01 LONG TERM (CURRENT) USE OF ANTICOAGULANTS: Primary | ICD-10-CM

## 2019-09-05 DIAGNOSIS — I48.21 ATRIAL FIBRILLATION, PERMANENT: ICD-10-CM

## 2019-09-05 LAB — INR PPP: 2.7 (ref 2–3)

## 2019-09-05 PROCEDURE — 85610 POCT INR: ICD-10-PCS | Mod: QW,S$GLB,, | Performed by: INTERNAL MEDICINE

## 2019-09-05 PROCEDURE — 85610 PROTHROMBIN TIME: CPT | Mod: QW,S$GLB,, | Performed by: INTERNAL MEDICINE

## 2019-09-05 PROCEDURE — 93793 ANTICOAG MGMT PT WARFARIN: CPT | Mod: S$GLB,,,

## 2019-09-05 PROCEDURE — 93793 PR ANTICOAGULANT MGMT FOR PT TAKING WARFARIN: ICD-10-PCS | Mod: S$GLB,,,

## 2019-09-05 NOTE — PROGRESS NOTES
Patient's INR is therapeutic at 2.7.  Reports no recent changes.  Instructed to maintain current dose of Warfarin 2 mg every Tuesday and Thursday; and 1 mg on all other days per week.  Dose calendar given and reviewed with patient.  Recheck in 1 month.

## 2019-11-05 RX ORDER — PANTOPRAZOLE SODIUM 40 MG/1
TABLET, DELAYED RELEASE ORAL
Qty: 90 TABLET | Refills: 3 | OUTPATIENT
Start: 2019-11-05

## 2019-11-05 RX ORDER — FUROSEMIDE 40 MG/1
TABLET ORAL
Qty: 60 TABLET | Refills: 11 | OUTPATIENT
Start: 2019-11-05

## 2019-11-13 ENCOUNTER — HOSPITAL ENCOUNTER (OUTPATIENT)
Facility: HOSPITAL | Age: 64
Discharge: HOME | End: 2019-11-15
Attending: EMERGENCY MEDICINE | Admitting: INTERNAL MEDICINE
Payer: MEDICARE

## 2019-11-13 DIAGNOSIS — R06.02 SOB (SHORTNESS OF BREATH): ICD-10-CM

## 2019-11-13 DIAGNOSIS — R06.01 ORTHOPNEA: ICD-10-CM

## 2019-11-13 DIAGNOSIS — I50.23 ACUTE ON CHRONIC SYSTOLIC CONGESTIVE HEART FAILURE: Primary | ICD-10-CM

## 2019-11-13 DIAGNOSIS — I50.9 CHF (CONGESTIVE HEART FAILURE): ICD-10-CM

## 2019-11-13 PROBLEM — I10 ESSENTIAL HYPERTENSION: Status: ACTIVE | Noted: 2019-11-13

## 2019-11-13 PROBLEM — F10.10 ETOH ABUSE: Status: ACTIVE | Noted: 2019-11-13

## 2019-11-13 LAB
ALBUMIN SERPL BCP-MCNC: 4.3 G/DL (ref 3.5–5.2)
ALP SERPL-CCNC: 89 U/L (ref 55–135)
ALT SERPL W/O P-5'-P-CCNC: 37 U/L (ref 10–44)
AMPHET+METHAMPHET UR QL: NEGATIVE
ANION GAP SERPL CALC-SCNC: 19 MMOL/L (ref 8–16)
AST SERPL-CCNC: 44 U/L (ref 10–40)
BACTERIA #/AREA URNS HPF: ABNORMAL /HPF
BARBITURATES UR QL SCN>200 NG/ML: NEGATIVE
BASOPHILS # BLD AUTO: 0.05 K/UL (ref 0–0.2)
BASOPHILS NFR BLD: 0.7 % (ref 0–1.9)
BENZODIAZ UR QL SCN>200 NG/ML: NEGATIVE
BILIRUB SERPL-MCNC: 1.7 MG/DL (ref 0.1–1)
BILIRUB UR QL STRIP: NEGATIVE
BNP SERPL-MCNC: 850 PG/ML (ref 0–99)
BUN SERPL-MCNC: 16 MG/DL (ref 8–23)
BZE UR QL SCN: NEGATIVE
CALCIUM SERPL-MCNC: 9.5 MG/DL (ref 8.7–10.5)
CANNABINOIDS UR QL SCN: NEGATIVE
CHLORIDE SERPL-SCNC: 102 MMOL/L (ref 95–110)
CK SERPL-CCNC: 75 U/L (ref 20–200)
CLARITY UR: CLEAR
CO2 SERPL-SCNC: 18 MMOL/L (ref 23–29)
COLOR UR: YELLOW
CREAT SERPL-MCNC: 1.1 MG/DL (ref 0.5–1.4)
CREAT UR-MCNC: 109.7 MG/DL (ref 23–375)
DIFFERENTIAL METHOD: ABNORMAL
EOSINOPHIL # BLD AUTO: 0.1 K/UL (ref 0–0.5)
EOSINOPHIL NFR BLD: 1.7 % (ref 0–8)
ERYTHROCYTE [DISTWIDTH] IN BLOOD BY AUTOMATED COUNT: 14.4 % (ref 11.5–14.5)
EST. GFR  (AFRICAN AMERICAN): >60 ML/MIN/1.73 M^2
EST. GFR  (NON AFRICAN AMERICAN): >60 ML/MIN/1.73 M^2
ESTIMATED PA SYSTOLIC PRESSURE: 38.91
GLUCOSE SERPL-MCNC: 116 MG/DL (ref 70–110)
GLUCOSE UR QL STRIP: NEGATIVE
HCT VFR BLD AUTO: 43.3 % (ref 40–54)
HCV AB SERPL QL IA: NEGATIVE
HGB BLD-MCNC: 13.7 G/DL (ref 14–18)
HGB UR QL STRIP: NEGATIVE
HIV 1+2 AB+HIV1 P24 AG SERPL QL IA: NEGATIVE
HYALINE CASTS #/AREA URNS LPF: 10 /LPF
IMM GRANULOCYTES # BLD AUTO: 0.04 K/UL (ref 0–0.04)
IMM GRANULOCYTES NFR BLD AUTO: 0.6 % (ref 0–0.5)
INR PPP: 2.9 (ref 0.8–1.2)
KETONES UR QL STRIP: NEGATIVE
LEUKOCYTE ESTERASE UR QL STRIP: NEGATIVE
LYMPHOCYTES # BLD AUTO: 1.4 K/UL (ref 1–4.8)
LYMPHOCYTES NFR BLD: 19.4 % (ref 18–48)
MAGNESIUM SERPL-MCNC: 1.5 MG/DL (ref 1.6–2.6)
MCH RBC QN AUTO: 30.3 PG (ref 27–31)
MCHC RBC AUTO-ENTMCNC: 31.6 G/DL (ref 32–36)
MCV RBC AUTO: 96 FL (ref 82–98)
METHADONE UR QL SCN>300 NG/ML: NEGATIVE
MICROSCOPIC COMMENT: ABNORMAL
MITRAL VALVE REGURGITATION: ABNORMAL
MONOCYTES # BLD AUTO: 0.8 K/UL (ref 0.3–1)
MONOCYTES NFR BLD: 10.7 % (ref 4–15)
NEUTROPHILS # BLD AUTO: 4.8 K/UL (ref 1.8–7.7)
NEUTROPHILS NFR BLD: 66.9 % (ref 38–73)
NITRITE UR QL STRIP: NEGATIVE
NRBC BLD-RTO: 0 /100 WBC
OPIATES UR QL SCN: NEGATIVE
PCP UR QL SCN>25 NG/ML: NEGATIVE
PH UR STRIP: 6 [PH] (ref 5–8)
PLATELET # BLD AUTO: 167 K/UL (ref 150–350)
PMV BLD AUTO: 10.4 FL (ref 9.2–12.9)
POTASSIUM SERPL-SCNC: 4.2 MMOL/L (ref 3.5–5.1)
PROT SERPL-MCNC: 8 G/DL (ref 6–8.4)
PROT UR QL STRIP: ABNORMAL
PROTHROMBIN TIME: 29.8 SEC (ref 9–12.5)
RBC # BLD AUTO: 4.52 M/UL (ref 4.6–6.2)
RBC #/AREA URNS HPF: 4 /HPF (ref 0–4)
RETIRED EF AND QEF - SEE NOTES: 15 (ref 55–65)
SODIUM SERPL-SCNC: 139 MMOL/L (ref 136–145)
SP GR UR STRIP: 1.02 (ref 1–1.03)
TOXICOLOGY INFORMATION: NORMAL
TRICUSPID VALVE REGURGITATION: ABNORMAL
TROPONIN I SERPL DL<=0.01 NG/ML-MCNC: 0.01 NG/ML (ref 0–0.03)
URN SPEC COLLECT METH UR: ABNORMAL
UROBILINOGEN UR STRIP-ACNC: 1 EU/DL
WBC # BLD AUTO: 7.17 K/UL (ref 3.9–12.7)
WBC #/AREA URNS HPF: 2 /HPF (ref 0–5)

## 2019-11-13 PROCEDURE — 96375 TX/PRO/DX INJ NEW DRUG ADDON: CPT

## 2019-11-13 PROCEDURE — 99215 PR OFFICE/OUTPT VISIT, EST, LEVL V, 40-54 MIN: ICD-10-PCS | Mod: 25,,, | Performed by: INTERNAL MEDICINE

## 2019-11-13 PROCEDURE — 63600175 PHARM REV CODE 636 W HCPCS: Performed by: NURSE PRACTITIONER

## 2019-11-13 PROCEDURE — 99285 EMERGENCY DEPT VISIT HI MDM: CPT | Mod: 25

## 2019-11-13 PROCEDURE — 25000003 PHARM REV CODE 250: Performed by: PHYSICIAN ASSISTANT

## 2019-11-13 PROCEDURE — G0378 HOSPITAL OBSERVATION PER HR: HCPCS

## 2019-11-13 PROCEDURE — 93010 EKG 12-LEAD: ICD-10-PCS | Mod: ,,, | Performed by: INTERNAL MEDICINE

## 2019-11-13 PROCEDURE — 85025 COMPLETE CBC W/AUTO DIFF WBC: CPT

## 2019-11-13 PROCEDURE — 97802 MEDICAL NUTRITION INDIV IN: CPT

## 2019-11-13 PROCEDURE — 96374 THER/PROPH/DIAG INJ IV PUSH: CPT

## 2019-11-13 PROCEDURE — 80307 DRUG TEST PRSMV CHEM ANLYZR: CPT

## 2019-11-13 PROCEDURE — 93306 TTE W/DOPPLER COMPLETE: CPT | Mod: 26,,, | Performed by: INTERNAL MEDICINE

## 2019-11-13 PROCEDURE — 81000 URINALYSIS NONAUTO W/SCOPE: CPT | Mod: 59

## 2019-11-13 PROCEDURE — 83735 ASSAY OF MAGNESIUM: CPT

## 2019-11-13 PROCEDURE — 82550 ASSAY OF CK (CPK): CPT

## 2019-11-13 PROCEDURE — 25000003 PHARM REV CODE 250: Performed by: EMERGENCY MEDICINE

## 2019-11-13 PROCEDURE — 63600175 PHARM REV CODE 636 W HCPCS: Performed by: EMERGENCY MEDICINE

## 2019-11-13 PROCEDURE — 85610 PROTHROMBIN TIME: CPT

## 2019-11-13 PROCEDURE — 83880 ASSAY OF NATRIURETIC PEPTIDE: CPT

## 2019-11-13 PROCEDURE — 80053 COMPREHEN METABOLIC PANEL: CPT

## 2019-11-13 PROCEDURE — 96376 TX/PRO/DX INJ SAME DRUG ADON: CPT

## 2019-11-13 PROCEDURE — 93005 ELECTROCARDIOGRAM TRACING: CPT

## 2019-11-13 PROCEDURE — 86703 HIV-1/HIV-2 1 RESULT ANTBDY: CPT

## 2019-11-13 PROCEDURE — 93010 ELECTROCARDIOGRAM REPORT: CPT | Mod: ,,, | Performed by: INTERNAL MEDICINE

## 2019-11-13 PROCEDURE — 93306 2D ECHO WITH COLOR FLOW DOPPLER: ICD-10-PCS | Mod: 26,,, | Performed by: INTERNAL MEDICINE

## 2019-11-13 PROCEDURE — 86803 HEPATITIS C AB TEST: CPT

## 2019-11-13 PROCEDURE — 99215 OFFICE O/P EST HI 40 MIN: CPT | Mod: 25,,, | Performed by: INTERNAL MEDICINE

## 2019-11-13 PROCEDURE — 80299 QUANTITATIVE ASSAY DRUG: CPT

## 2019-11-13 PROCEDURE — 83036 HEMOGLOBIN GLYCOSYLATED A1C: CPT

## 2019-11-13 PROCEDURE — 93306 TTE W/DOPPLER COMPLETE: CPT

## 2019-11-13 PROCEDURE — 36415 COLL VENOUS BLD VENIPUNCTURE: CPT

## 2019-11-13 PROCEDURE — 25000003 PHARM REV CODE 250: Performed by: NURSE PRACTITIONER

## 2019-11-13 PROCEDURE — 84484 ASSAY OF TROPONIN QUANT: CPT

## 2019-11-13 RX ORDER — ONDANSETRON 8 MG/1
8 TABLET, ORALLY DISINTEGRATING ORAL EVERY 8 HOURS PRN
Status: DISCONTINUED | OUTPATIENT
Start: 2019-11-13 | End: 2019-11-15 | Stop reason: HOSPADM

## 2019-11-13 RX ORDER — LISINOPRIL 20 MG/1
40 TABLET ORAL DAILY
Status: DISCONTINUED | OUTPATIENT
Start: 2019-11-13 | End: 2019-11-15 | Stop reason: HOSPADM

## 2019-11-13 RX ORDER — PANTOPRAZOLE SODIUM 40 MG/1
40 TABLET, DELAYED RELEASE ORAL DAILY
Status: DISCONTINUED | OUTPATIENT
Start: 2019-11-13 | End: 2019-11-15 | Stop reason: HOSPADM

## 2019-11-13 RX ORDER — AMIODARONE HYDROCHLORIDE 200 MG/1
200 TABLET ORAL DAILY
Status: DISCONTINUED | OUTPATIENT
Start: 2019-11-13 | End: 2019-11-15 | Stop reason: HOSPADM

## 2019-11-13 RX ORDER — WARFARIN 1 MG/1
1 TABLET ORAL
Status: DISCONTINUED | OUTPATIENT
Start: 2019-11-16 | End: 2019-11-15 | Stop reason: HOSPADM

## 2019-11-13 RX ORDER — FUROSEMIDE 10 MG/ML
40 INJECTION INTRAMUSCULAR; INTRAVENOUS
Status: COMPLETED | OUTPATIENT
Start: 2019-11-13 | End: 2019-11-13

## 2019-11-13 RX ORDER — ACETAMINOPHEN 325 MG/1
650 TABLET ORAL EVERY 6 HOURS PRN
Status: DISCONTINUED | OUTPATIENT
Start: 2019-11-13 | End: 2019-11-15 | Stop reason: HOSPADM

## 2019-11-13 RX ORDER — CARVEDILOL 3.12 MG/1
6.25 TABLET ORAL 2 TIMES DAILY WITH MEALS
Status: DISCONTINUED | OUTPATIENT
Start: 2019-11-13 | End: 2019-11-13

## 2019-11-13 RX ORDER — ISOSORBIDE MONONITRATE 60 MG/1
60 TABLET, EXTENDED RELEASE ORAL DAILY
Status: DISCONTINUED | OUTPATIENT
Start: 2019-11-13 | End: 2019-11-15 | Stop reason: HOSPADM

## 2019-11-13 RX ORDER — WARFARIN 1 MG/1
1 TABLET ORAL
Status: DISCONTINUED | OUTPATIENT
Start: 2019-11-13 | End: 2019-11-15 | Stop reason: HOSPADM

## 2019-11-13 RX ORDER — WARFARIN 2 MG/1
2 TABLET ORAL
Status: DISCONTINUED | OUTPATIENT
Start: 2019-11-14 | End: 2019-11-15 | Stop reason: HOSPADM

## 2019-11-13 RX ORDER — CARVEDILOL 12.5 MG/1
12.5 TABLET ORAL 2 TIMES DAILY WITH MEALS
Status: DISCONTINUED | OUTPATIENT
Start: 2019-11-13 | End: 2019-11-15 | Stop reason: HOSPADM

## 2019-11-13 RX ORDER — SODIUM CHLORIDE 0.9 % (FLUSH) 0.9 %
10 SYRINGE (ML) INJECTION
Status: DISCONTINUED | OUTPATIENT
Start: 2019-11-13 | End: 2019-11-15 | Stop reason: HOSPADM

## 2019-11-13 RX ORDER — DIGOXIN 125 MCG
250 TABLET ORAL DAILY
Status: DISCONTINUED | OUTPATIENT
Start: 2019-11-13 | End: 2019-11-15 | Stop reason: HOSPADM

## 2019-11-13 RX ORDER — HYDROCODONE BITARTRATE AND ACETAMINOPHEN 5; 325 MG/1; MG/1
1 TABLET ORAL EVERY 6 HOURS PRN
Status: DISCONTINUED | OUTPATIENT
Start: 2019-11-13 | End: 2019-11-15 | Stop reason: HOSPADM

## 2019-11-13 RX ORDER — FUROSEMIDE 10 MG/ML
60 INJECTION INTRAMUSCULAR; INTRAVENOUS 2 TIMES DAILY
Status: DISCONTINUED | OUTPATIENT
Start: 2019-11-13 | End: 2019-11-15 | Stop reason: HOSPADM

## 2019-11-13 RX ORDER — FUROSEMIDE 10 MG/ML
40 INJECTION INTRAMUSCULAR; INTRAVENOUS 2 TIMES DAILY
Status: DISCONTINUED | OUTPATIENT
Start: 2019-11-13 | End: 2019-11-13

## 2019-11-13 RX ORDER — ALLOPURINOL 300 MG/1
300 TABLET ORAL DAILY
Status: DISCONTINUED | OUTPATIENT
Start: 2019-11-13 | End: 2019-11-15 | Stop reason: HOSPADM

## 2019-11-13 RX ORDER — DILTIAZEM HYDROCHLORIDE 5 MG/ML
15 INJECTION INTRAVENOUS
Status: COMPLETED | OUTPATIENT
Start: 2019-11-13 | End: 2019-11-13

## 2019-11-13 RX ORDER — MAGNESIUM SULFATE 1 G/100ML
1 INJECTION INTRAVENOUS
Status: COMPLETED | OUTPATIENT
Start: 2019-11-13 | End: 2019-11-13

## 2019-11-13 RX ADMIN — DIGOXIN 250 MCG: 125 TABLET ORAL at 11:11

## 2019-11-13 RX ADMIN — FUROSEMIDE 40 MG: 10 INJECTION, SOLUTION INTRAMUSCULAR; INTRAVENOUS at 08:11

## 2019-11-13 RX ADMIN — MAGNESIUM SULFATE 1 G: 1 INJECTION INTRAVENOUS at 06:11

## 2019-11-13 RX ADMIN — DILTIAZEM HYDROCHLORIDE 15 MG: 5 INJECTION INTRAVENOUS at 06:11

## 2019-11-13 RX ADMIN — WARFARIN SODIUM 1 MG: 1 TABLET ORAL at 06:11

## 2019-11-13 RX ADMIN — AMIODARONE HYDROCHLORIDE 200 MG: 200 TABLET ORAL at 11:11

## 2019-11-13 RX ADMIN — ALLOPURINOL 300 MG: 300 TABLET ORAL at 11:11

## 2019-11-13 RX ADMIN — PANTOPRAZOLE SODIUM 40 MG: 40 TABLET, DELAYED RELEASE ORAL at 11:11

## 2019-11-13 RX ADMIN — MAGNESIUM SULFATE 1 G: 1 INJECTION INTRAVENOUS at 05:11

## 2019-11-13 RX ADMIN — MAGNESIUM SULFATE 1 G: 1 INJECTION INTRAVENOUS at 01:11

## 2019-11-13 RX ADMIN — CARVEDILOL 12.5 MG: 12.5 TABLET, FILM COATED ORAL at 05:11

## 2019-11-13 RX ADMIN — LISINOPRIL 40 MG: 20 TABLET ORAL at 11:11

## 2019-11-13 RX ADMIN — ISOSORBIDE MONONITRATE 60 MG: 60 TABLET, EXTENDED RELEASE ORAL at 11:11

## 2019-11-13 RX ADMIN — FUROSEMIDE 60 MG: 10 INJECTION, SOLUTION INTRAMUSCULAR; INTRAVENOUS at 06:11

## 2019-11-13 NOTE — ASSESSMENT & PLAN NOTE
Previous EF 20-25%, RVE with mildly depressed RV systolic fn  Repeat ECHO pending  Needs aggressive IV diuresis  Continue Coreg, IV lasix BID, ACEi  Dash diet, 2 gm sodium restriction  1200 ml fluid restriction  Daily weights  Reinforced importance of compliance with medications and dietary restrictions  Discussed importance of alcohol cessation  Reassess in AM

## 2019-11-13 NOTE — PROGRESS NOTES
Clinical Pharmacy Consult Note: Coumadin Dosing and Monitoring     Ute brambila Coumadin will be dosed and monitored by Pharmacy at the request of FRANCO Leal.      Indication: Atrial fibrillation  Target INR is 2-3    Lab Results   Component Value Date    INR 2.9 (H) 11/13/2019    INR 2.7 09/05/2019    INR 1.9 (A) 08/15/2019     Patient will be continued home dosing regimen of Warfarin 2 mg every Tuesday and Thursday, and Warfarin 1 mg on all other days. PT/INR will be monitored daily. Dose adjustments will be made accordingly.      Thank you for allowing us to participate in this patient's care.     Diana Carrion, Ben 11/13/2019 4:03 PM

## 2019-11-13 NOTE — ASSESSMENT & PLAN NOTE
-Follow-up echocardiogram.  -IV diuretics.  -Continue carvedilol and digoxin.  -Fluid restriction and cardiac diet.  -Monitor volume status with daily weights and strict intake and output.  -Cardiology consult.

## 2019-11-13 NOTE — ED NOTES
Pt lying in bed in NAD,VSS,RR equal and unlabored. Bed is low, locked, and call light in reach. Side rails up x 2.  Pt and pt wife eating at this time, sandwich was provided for wife while at bedside. Pt states that his stomach does not feel as distended. Pt denies SOB at this time.

## 2019-11-13 NOTE — HPI
Ute Mcbride is a 64 year old male who presented to [facility] due to shortness of breath over the past few days. He reports noncompliance since Saturday and has drank 12 beers over the weekend. His current medical conditions include Chronic combined systolic/diastolic CHF, HFrEF of 20-25%, AFIB on Coumadin, Gout, ETOH use, HTN, HLP. He reports that he ran out of medications a few days ago as well. ED workup revealed , INR 2.9, T BILI 1.7, H/H 13/43, Troponin 0.01. He was placed in observation under the care of [facility] medicine. Cardiology consulted to assist with management. Chart reviewed. Patient seen and examined today in ED. Denies chest pain on exam today. He reports improvement in shortness of breath today at time of exam. Continues to endorse abdominal bloating today. ECHO pending. Has history of failed Ablation for AFIB in the past. Will resume home medications and aggressively diurese today. Further recs to follow.   
Ute Mcbride is a 64-year-old male with atrial fibrillation, systolic heart failure, CAD and HTN that presented to the ED with a chief complaint of abdominal swelling. He stated that it started on the morning of 11/12/19 and worsened that night. Associated symptoms included shortness of breath that was worse with activity and palpitations. He denied leg swelling, orthopnea, fever, cough, chest pain, abdominal pain, nausea, vomiting, constipation and diarrhea. Of note, the patient stated that he ran out of his digoxin about 2 weeks prior to presentation. In the ED, labs were remarkable for a BNP of 850. Chest X-rayed showed cardiomegaly. EKG showed atrial fibrillation with RVR.  
Detail Level: Detailed
Apple cider vinegar soaks

## 2019-11-13 NOTE — PROGRESS NOTES
Clinical Pharmacy Progress Note: Medication Education     Pharmacist educated patient/caregiver on warfarin indication, side effects, and drug interactions. Pharmacist discussed importance of medication compliance and INR monitoring and reviewed signs of abnormal bleeding. Pharmacist gave patient/caregiver warfarin educational handout. Instructed patient/caregiver to contact the Coumadin Clinic at [phone] for follow-up after discharge. Patient/caregiver expressed understanding and had no further questions.    Thank you for allowing us to participate in this patient's care.     Diana Carrion 11/13/2019 4:05 PM

## 2019-11-13 NOTE — PROGRESS NOTES
Diet Education: Coumadin diet and Heart Failure Nutrition Therapy from Nutrition Care Manual     Time Spent: 20 minutes   Learners: Pt and wife       Nutrition Education provided with handouts: yes       Comments: Provided coumadin & heart failure nutrition diet education. Pt verbalized understanding. All questions and concerns answered. Dietitian's contact information provided. Pt reports good PO intake and no reported no weight loss. NFPE not performed due to Pt has no signs of malnutrition.         Please Re-consult as needed        Thanks!

## 2019-11-13 NOTE — ASSESSMENT & PLAN NOTE
-Continue Amiodarone, Coumadin, BB, Digoxin  -No CNS complaints to suggest TIA or CVA today  -No signs of abnormal bleeding  -Check Amio level  -Pharmacy to dose Coumadin

## 2019-11-13 NOTE — ED PROVIDER NOTES
SCRIBE #1 NOTE: I, Corinne Mack, am scribing for, and in the presence of, Delfino Carbone MD. I have scribed the entire note.      History      Chief Complaint   Patient presents with    Shortness of Breath     hx of  CHF       Review of patient's allergies indicates:  No Known Allergies     HPI   HPI    11/13/2019, 6:21 AM   History obtained from the patient      History of Present Illness: Ute Mcbride is a 64 y.o. male patient with PMHx of Afib, CAD, CHF, gout, HTN, and cardiomyopathy who presents to the Emergency Department for worsening SOB which onset gradually 4 days ago. Symptoms are constant and moderate in severity. Pt's sxs are worse with exertion. No mitigating factors reported. No associated sxs reported. Patient denies any fever, chills, CP, N/V/D, abd pain, back pain, neck pain, HA, dizziness, and all other sxs at this time. No prior Tx reported. No further complaints or concerns at this time.         Arrival mode: Personal vehicle    PCP: Kenneth Redding MD       Past Medical History:  Past Medical History:   Diagnosis Date    Anticoagulant long-term use     but has been noncompliant    Arthritis     Atrial fibrillation, chronic     Cardiomyopathy, nonischemic 11/9/2014    CHF (congestive heart failure)     Coronary artery disease     Encounter for blood transfusion     Gout     Hypertension        Past Surgical History:  Past Surgical History:   Procedure Laterality Date    ABLATION N/A 8/15/2018    Procedure: ABLATION;  Surgeon: Mario Lou MD;  Location: [facility] CATH LAB;  Service: Cardiology;  Laterality: N/A;  AF, PEGGY, PVI, RFA, RUDDY, Gen, MB, 3 Prep    ANKLE SURGERY      CARDIAC CATHETERIZATION      CATARACT EXTRACTION W/  INTRAOCULAR LENS IMPLANT Bilateral          Family History:  Family History   Problem Relation Age of Onset    Stroke Mother     Hypertension Mother     Hypertension Father        Social History:  Social History     Tobacco Use    Smoking  status: Former Smoker     Last attempt to quit: 1994     Years since quittin.8    Smokeless tobacco: Never Used   Substance and Sexual Activity    Alcohol use: Yes     Alcohol/week: 2.0 standard drinks     Types: 2 Cans of beer per week    Drug use: No    Sexual activity: Unknown       ROS   Review of Systems   Constitutional: Negative for chills and fever.   Respiratory: Positive for shortness of breath. Negative for cough.    Cardiovascular: Negative for chest pain and leg swelling.   Gastrointestinal: Negative for abdominal pain, diarrhea, nausea and vomiting.   Musculoskeletal: Negative for back pain, neck pain and neck stiffness.   Skin: Negative for rash and wound.   Neurological: Negative for dizziness, light-headedness, numbness and headaches.   All other systems reviewed and are negative.    Physical Exam      Initial Vitals   BP Pulse Resp Temp SpO2   19 0621 19 0617 19 0621 19 0621 19 0621   (!) 165/105 110 18 97.7 °F (36.5 °C) 99 %      MAP       --                 Physical Exam  Nursing Notes and Vital Signs Reviewed.  Constitutional: Patient is in no acute distress. Well-developed and well-nourished.  Head: Atraumatic. Normocephalic.  Eyes: PERRL. EOM intact. Conjunctivae are not pale. No scleral icterus.  ENT: Mucous membranes are moist. Oropharynx is clear and symmetric.    Neck: Supple. Full ROM. No lymphadenopathy.  Cardiovascular: Tachycardic. Irregularly irregular rhythm. No murmurs, rubs, or gallops. Distal pulses are 2+ and symmetric.  Pulmonary/Chest: No respiratory distress. Diminished breath sounds bilaterally. No wheezing or rales.  Abdominal: Soft and non-distended.  There is no tenderness.  No rebound, guarding, or rigidity.   Musculoskeletal: Moves all extremities. No obvious deformities. No edema. Gouty tophi to the bilateral hands.  Skin: Warm and dry.  Neurological:  Alert, awake, and appropriate.  Normal speech.  No acute focal neurological  "deficits are appreciated.  Psychiatric: Normal affect. Good eye contact. Appropriate in content.    ED Course    Procedures  ED Vital Signs:  Vitals:    11/13/19 0617 11/13/19 0621 11/13/19 0630 11/13/19 0732   BP:  (!) 165/105  125/86   Pulse: 110 110 (!) 111 85   Resp:  18  20   Temp:  97.7 °F (36.5 °C)     TempSrc:  Oral     SpO2:  99%  96%   Weight:  81.1 kg (178 lb 12.8 oz)     Height:  5' 6" (1.676 m)      11/13/19 0822   BP: (!) 125/92   Pulse: 92   Resp: 20   Temp:    TempSrc:    SpO2: 95%   Weight:    Height:        Abnormal Lab Results:  Labs Reviewed   CBC W/ AUTO DIFFERENTIAL - Abnormal; Notable for the following components:       Result Value    RBC 4.52 (*)     Hemoglobin 13.7 (*)     Mean Corpuscular Hemoglobin Conc 31.6 (*)     Immature Granulocytes 0.6 (*)     All other components within normal limits   COMPREHENSIVE METABOLIC PANEL - Abnormal; Notable for the following components:    CO2 18 (*)     Glucose 116 (*)     Total Bilirubin 1.7 (*)     AST 44 (*)     Anion Gap 19 (*)     All other components within normal limits   URINALYSIS, REFLEX TO URINE CULTURE - Abnormal; Notable for the following components:    Protein, UA 1+ (*)     All other components within normal limits    Narrative:     Preferred Collection Type->Urine, Clean Catch   B-TYPE NATRIURETIC PEPTIDE - Abnormal; Notable for the following components:     (*)     All other components within normal limits   URINALYSIS MICROSCOPIC - Abnormal; Notable for the following components:    Hyaline Casts, UA 10 (*)     All other components within normal limits    Narrative:     Preferred Collection Type->Urine, Clean Catch   CK   TROPONIN I   HIV 1 / 2 ANTIBODY   HEPATITIS C ANTIBODY   PROTIME-INR        All Lab Results:  Results for orders placed or performed during the hospital encounter of 11/13/19   CBC auto differential   Result Value Ref Range    WBC 7.17 3.90 - 12.70 K/uL    RBC 4.52 (L) 4.60 - 6.20 M/uL    Hemoglobin 13.7 (L) " 14.0 - 18.0 g/dL    Hematocrit 43.3 40.0 - 54.0 %    Mean Corpuscular Volume 96 82 - 98 fL    Mean Corpuscular Hemoglobin 30.3 27.0 - 31.0 pg    Mean Corpuscular Hemoglobin Conc 31.6 (L) 32.0 - 36.0 g/dL    RDW 14.4 11.5 - 14.5 %    Platelets 167 150 - 350 K/uL    MPV 10.4 9.2 - 12.9 fL    Immature Granulocytes 0.6 (H) 0.0 - 0.5 %    Gran # (ANC) 4.8 1.8 - 7.7 K/uL    Immature Grans (Abs) 0.04 0.00 - 0.04 K/uL    Lymph # 1.4 1.0 - 4.8 K/uL    Mono # 0.8 0.3 - 1.0 K/uL    Eos # 0.1 0.0 - 0.5 K/uL    Baso # 0.05 0.00 - 0.20 K/uL    nRBC 0 0 /100 WBC    Gran% 66.9 38.0 - 73.0 %    Lymph% 19.4 18.0 - 48.0 %    Mono% 10.7 4.0 - 15.0 %    Eosinophil% 1.7 0.0 - 8.0 %    Basophil% 0.7 0.0 - 1.9 %    Differential Method Automated    Comprehensive metabolic panel   Result Value Ref Range    Sodium 139 136 - 145 mmol/L    Potassium 4.2 3.5 - 5.1 mmol/L    Chloride 102 95 - 110 mmol/L    CO2 18 (L) 23 - 29 mmol/L    Glucose 116 (H) 70 - 110 mg/dL    BUN, Bld 16 8 - 23 mg/dL    Creatinine 1.1 0.5 - 1.4 mg/dL    Calcium 9.5 8.7 - 10.5 mg/dL    Total Protein 8.0 6.0 - 8.4 g/dL    Albumin 4.3 3.5 - 5.2 g/dL    Total Bilirubin 1.7 (H) 0.1 - 1.0 mg/dL    Alkaline Phosphatase 89 55 - 135 U/L    AST 44 (H) 10 - 40 U/L    ALT 37 10 - 44 U/L    Anion Gap 19 (H) 8 - 16 mmol/L    eGFR if African American >60 >60 mL/min/1.73 m^2    eGFR if non African American >60 >60 mL/min/1.73 m^2   Urinalysis, Reflex to Urine Culture Urine, Clean Catch   Result Value Ref Range    Specimen UA Urine, Clean Catch     Color, UA Yellow Yellow, Straw, My    Appearance, UA Clear Clear    pH, UA 6.0 5.0 - 8.0    Specific Gravity, UA 1.025 1.005 - 1.030    Protein, UA 1+ (A) Negative    Glucose, UA Negative Negative    Ketones, UA Negative Negative    Bilirubin (UA) Negative Negative    Occult Blood UA Negative Negative    Nitrite, UA Negative Negative    Urobilinogen, UA 1.0 <2.0 EU/dL    Leukocytes, UA Negative Negative   Brain natriuretic peptide   Result  Value Ref Range     (H) 0 - 99 pg/mL   CK   Result Value Ref Range    CPK 75 20 - 200 U/L   Troponin I   Result Value Ref Range    Troponin I 0.010 0.000 - 0.026 ng/mL   HIV 1/2 Ag/Ab (4th Gen)   Result Value Ref Range    HIV 1/2 Ag/Ab Negative Negative   Hepatitis C antibody   Result Value Ref Range    Hepatitis C Ab Negative Negative   Urinalysis Microscopic   Result Value Ref Range    RBC, UA 4 0 - 4 /hpf    WBC, UA 2 0 - 5 /hpf    Bacteria Rare None-Occ /hpf    Hyaline Casts, UA 10 (A) 0-1/lpf /lpf    Microscopic Comment SEE COMMENT        Imaging Results:  Imaging Results          X-Ray Chest AP Portable (Final result)  Result time 11/13/19 07:24:13    Final result by Andrez Rodríguez MD (11/13/19 07:24:13)                 Impression:      Cardiomegaly.      Electronically signed by: Andrez Rodríguez MD  Date:    11/13/2019  Time:    07:24             Narrative:    EXAMINATION:  XR CHEST AP PORTABLE    CLINICAL HISTORY:  sob;    FINDINGS:  Single view of the chest.   Aorta demonstrates atherosclerotic disease.    Cardiac silhouette is enlarged but stable.  The lungs demonstrate no evidence of active disease.  No evidence of pleural effusion or pneumothorax.  Bones appear intact.                                 The EKG was ordered, reviewed, and independently interpreted by the ED provider.  Interpretation time: 0645  Rate: 105 BPM  Rhythm: atrial fibrillation and RVR  Interpretation: RBBB. L posterior fascicular block. Bifascicular block. Septal infarct. No STEMI.             The Emergency Provider reviewed the vital signs and test results, which are outlined above.    ED Discussion     7:58 AM: Re-evaluated pt. I have discussed test results, shared treatment plan, and the need for admission with patient and family at bedside. Pt and family express understanding at this time and agree with all information. All questions answered. Pt and family have no further questions or concerns at this time. Pt is ready  for admit.    8:08 AM: Discussed case with FRANCO Ramos ([facility]). FRANCO Ramos agrees with current care and management of pt and accepts admission.   Admitting Service: Hospital medicine   Admitting Physician: Dr. Peters  Admit to: Obs Tele        ED Medication(s):  Medications   diltiaZEM injection 15 mg (15 mg Intravenous Given 11/13/19 0658)   furosemide injection 40 mg (40 mg Intravenous Given 11/13/19 0823)          Medication List      ASK your doctor about these medications    * allopurinol 300 MG tablet  Commonly known as:  ZYLOPRIM     * allopurinol 100 MG tablet  Commonly known as:  ZYLOPRIM  Take 1 tablet (100 mg total) by mouth once daily.     amiodarone 200 MG Tab  Commonly known as:  PACERONE  Take 1 tablet (200 mg total) by mouth once daily.     carvedilol 6.25 MG tablet  Commonly known as:  COREG  Take 1 tablet (6.25 mg total) by mouth 2 (two) times daily with meals.     digoxin 250 mcg tablet  Commonly known as:  LANOXIN  Take 1 tablet (250 mcg total) by mouth once daily.     furosemide 40 MG tablet  Commonly known as:  LASIX  Take 1 tablet (40 mg total) by mouth 2 (two) times daily.     isosorbide mononitrate 60 MG 24 hr tablet  Commonly known as:  IMDUR  Take 1 tablet (60 mg total) by mouth once daily.     lisinopril 40 MG tablet  Commonly known as:  PRINIVIL,ZESTRIL  Take 1 tablet (40 mg total) by mouth once daily.     mupirocin 2 % ointment  Commonly known as:  BACTROBAN  Apply topically 3 (three) times daily.     pantoprazole 40 MG tablet  Commonly known as:  PROTONIX  Take 1 tablet (40 mg total) by mouth once daily.     warfarin 2 MG tablet  Commonly known as:  COUMADIN  Take 1 tablet on Tuesdays and 1/2 tablet on all other day by mouth every evening as directed by coumadin clinic.         * This list has 2 medication(s) that are the same as other medications prescribed for you. Read the directions carefully, and ask your doctor or other care provider to review them  with you.                      Medical Decision Making           Medical Decision Making:   Clinical Tests:   Lab Tests: Ordered and Reviewed  Radiological Study: Ordered and Reviewed  Medical Tests: Ordered and Reviewed           Scribe Attestation:   Scribe #1: I performed the above scribed service and the documentation accurately describes the services I performed. I attest to the accuracy of the note 11/13/2019.    Attending:   Physician Attestation Statement for Scribe #1: I, Delfino Carbone MD, personally performed the services described in this documentation, as scribed by Corinne Mack, in my presence, and it is both accurate and complete.          Clinical Impression       ICD-10-CM ICD-9-CM   1. Acute on chronic systolic congestive heart failure I50.23 428.23     428.0   2. SOB (shortness of breath) R06.02 786.05   3. CHF (congestive heart failure) I50.9 428.0   4. Orthopnea R06.01 786.02       Disposition:   Disposition: Placed in Observation  Condition: Fair           Delfino Carbone MD  11/13/19 0840

## 2019-11-13 NOTE — ED NOTES
Pt lying in bed in NAD,VSS,RR equal and unlabored. Bed is low, locked, and call light in reach. Side rails up x 2.  Pt denies any SOB at this time, wife at bedside.

## 2019-11-13 NOTE — ASSESSMENT & PLAN NOTE
-Rate improved with IV Cardizem  -Restart digoxin.  -Continue amiodarone, Coreg and Coumadin.  -INR therapeutic.  -Cardiology consult.

## 2019-11-13 NOTE — PLAN OF CARE
SW met with patient and wife at bedside to complete discharge planning. Patient lives with his wife. Patient states he doesn't use any equipment in the home and no HH comes to his home. However, He did mention someone from [facility] came by his home and checked his blood pressure. Patient's POA is his son Jovan Mcbride no phone number was provided. PT denies outpatient treatment. Denies any needs right now. Transitional Care Folder, Discharge Planning Begins on Admission pamphlet, [facility] Bedside Delivery pamphlet, Advance Directive information given to patient. Sw placed contact information on white board. Sw instructed patient or family to call with any questions or concerns.    D/C plan: TBD    Kenneth Redding MD      [id number] - [place], [place] - [address]  [address]  [facility] [address]  Phone: [phone] Fax: [phone]    [place], [place] - [address]  [address]  [facility] [address]  Phone: [phone] Fax: [phone]         11/13/19 1111   Discharge Assessment   Assessment Type Discharge Planning Assessment   Confirmed/corrected address and phone number on facesheet? Yes   Assessment information obtained from? Patient   Communicated expected length of stay with patient/caregiver no   Prior to hospitilization cognitive status: Alert/Oriented   Prior to hospitalization functional status: Independent   Current cognitive status: Alert/Oriented   Current Functional Status: Independent   Facility Arrived From: Home   Lives With spouse   Able to Return to Prior Arrangements yes   Is patient able to care for self after discharge? Yes   Who are your caregiver(s) and their phone number(s)? April Reed [phone] Wife   Readmission Within the Last 30 Days no previous admission in last 30 days   Patient currently being followed by outpatient case management? No   Patient currently receives any other outside agency services? No   Equipment Currently  Used at Home none   Do you have any problems affording any of your prescribed medications? No   Is the patient taking medications as prescribed? yes   Does the patient have transportation home? Yes   Transportation Anticipated family or friend will provide   Does the patient receive services at the Coumadin Clinic? No   Discharge Plan A Home   Discharge Plan B Home with family   DME Needed Upon Discharge  none

## 2019-11-13 NOTE — H&P
[facility] Hospital Medicine  History & Physical    Patient Name: Ute Mcbride  MRN: [id number]  Admission Date: 11/13/2019  Attending Physician: Delfino Carbone MD   Primary Care Provider: Kenneth Redding MD         Patient information was obtained from patient, past medical records and ER records.     Subjective:     Principal Problem:Acute on chronic systolic congestive heart failure    Chief Complaint:   Chief Complaint   Patient presents with    Shortness of Breath     hx of  CHF        HPI: Ute Mcbride is a 64-year-old male with atrial fibrillation, systolic heart failure, CAD and HTN that presented to the ED with a chief complaint of abdominal swelling. He stated that it started on the morning of 11/12/19 and worsened that night. Associated symptoms included shortness of breath that was worse with activity and palpitations. He denied leg swelling, orthopnea, fever, cough, chest pain, abdominal pain, nausea, vomiting, constipation and diarrhea. Of note, the patient stated that he ran out of his digoxin about 2 weeks prior to presentation. In the ED, labs were remarkable for a BNP of 850. Chest X-rayed showed cardiomegaly. EKG showed atrial fibrillation with RVR.    Past Medical History:   Diagnosis Date    Anticoagulant long-term use     but has been noncompliant    Arthritis     Atrial fibrillation, chronic     Cardiomyopathy, nonischemic 11/9/2014    Coronary artery disease     Encounter for blood transfusion     Gout     Hypertension     Systolic heart failure        Past Surgical History:   Procedure Laterality Date    ABLATION N/A 8/15/2018    Procedure: ABLATION;  Surgeon: Mario Lou MD;  Location: [facility] CATH LAB;  Service: Cardiology;  Laterality: N/A;  AF, PEGGY, PVI, RFA, RUDDY, Gen, MB, 3 Prep    ANKLE SURGERY      CARDIAC CATHETERIZATION      CATARACT EXTRACTION W/  INTRAOCULAR LENS IMPLANT Bilateral        Review of patient's allergies indicates:  No  Known Allergies    No current facility-administered medications on file prior to encounter.      Current Outpatient Medications on File Prior to Encounter   Medication Sig    allopurinol (ZYLOPRIM) 100 MG tablet Take 1 tablet (100 mg total) by mouth once daily.    allopurinol (ZYLOPRIM) 300 MG tablet Take 300 mg by mouth once daily.     amiodarone (PACERONE) 200 MG Tab Take 1 tablet (200 mg total) by mouth once daily.    carvedilol (COREG) 6.25 MG tablet Take 1 tablet (6.25 mg total) by mouth 2 (two) times daily with meals.    digoxin (LANOXIN) 250 mcg tablet Take 1 tablet (250 mcg total) by mouth once daily.    isosorbide mononitrate (IMDUR) 60 MG 24 hr tablet Take 1 tablet (60 mg total) by mouth once daily.    lisinopril (PRINIVIL,ZESTRIL) 40 MG tablet Take 1 tablet (40 mg total) by mouth once daily.    mupirocin (BACTROBAN) 2 % ointment Apply topically 3 (three) times daily.    pantoprazole (PROTONIX) 40 MG tablet Take 1 tablet (40 mg total) by mouth once daily.    warfarin (COUMADIN) 2 MG tablet Take 1 tablet on  and 1/2 tablet on all other day by mouth every evening as directed by coumadin clinic. (Patient taking differently: Take 1 tablet every Tuesday and Thursday and 1/2 tablet on all other day by mouth every evening as directed by coumadin clinic.)    furosemide (LASIX) 40 MG tablet Take 1 tablet (40 mg total) by mouth 2 (two) times daily.     Family History     Problem Relation (Age of Onset)    Hypertension Mother, Father    Stroke Mother        Tobacco Use    Smoking status: Former Smoker     Last attempt to quit: 1994     Years since quittin.8    Smokeless tobacco: Never Used   Substance and Sexual Activity    Alcohol use: Yes     Alcohol/week: 3.0 standard drinks     Types: 3 Cans of beer per week     Frequency: 2-3 times a week     Drinks per session: 3 or 4    Drug use: No    Sexual activity: Not on file     Review of Systems   Constitutional: Negative for appetite  change, chills, diaphoresis, fatigue and fever.   HENT: Negative for congestion, ear pain, mouth sores, sore throat and trouble swallowing.    Eyes: Negative for pain and visual disturbance.   Respiratory: Positive for shortness of breath. Negative for cough and chest tightness.    Cardiovascular: Positive for palpitations. Negative for chest pain and leg swelling.   Gastrointestinal: Positive for abdominal distention. Negative for abdominal pain, constipation, diarrhea, nausea and vomiting.   Endocrine: Negative for cold intolerance, heat intolerance, polydipsia and polyuria.   Genitourinary: Negative for dysuria, frequency and hematuria.   Musculoskeletal: Negative for arthralgias, back pain, myalgias and neck pain.   Skin: Negative for pallor, rash and wound.   Allergic/Immunologic: Negative for environmental allergies and immunocompromised state.   Neurological: Negative for dizziness, seizures, syncope, weakness, numbness and headaches.   Hematological: Negative for adenopathy. Does not bruise/bleed easily.   Psychiatric/Behavioral: Negative for agitation, confusion and sleep disturbance.     Objective:     Vital Signs (Most Recent):  Temp: 97.7 °F (36.5 °C) (11/13/19 0621)  Pulse: 92 (11/13/19 0917)  Resp: 20 (11/13/19 0917)  BP: (!) 134/94 (11/13/19 0917)  SpO2: 96 % (11/13/19 0917) Vital Signs (24h Range):  Temp:  [97.7 °F (36.5 °C)] 97.7 °F (36.5 °C)  Pulse:  [] 92  Resp:  [18-20] 20  SpO2:  [95 %-99 %] 96 %  BP: (125-165)/() 134/94     Weight: 81.1 kg (178 lb 12.8 oz)  Body mass index is 28.86 kg/m².    Physical Exam   Constitutional: He is oriented to person, place, and time. He appears well-developed and well-nourished.   HENT:   Head: Normocephalic and atraumatic.   Eyes: Conjunctivae are normal.   Neck: Neck supple. No JVD present.   Cardiovascular: Normal heart sounds. An irregularly irregular rhythm present. Tachycardia present.   Pulmonary/Chest: Effort normal and breath sounds normal.  He has no wheezes.   Abdominal: Soft. Bowel sounds are normal. He exhibits distension (mild). There is no tenderness.   Musculoskeletal: Normal range of motion. He exhibits deformity (bilateral hands due to gout).   Neurological: He is alert and oriented to person, place, and time.   Skin: Skin is warm and dry. No rash noted.   Psychiatric: He has a normal mood and affect. His behavior is normal. Thought content normal.   Nursing note and vitals reviewed.          Significant Labs:   CBC:   Recent Labs   Lab 11/13/19  0653   WBC 7.17   HGB 13.7*   HCT 43.3        CMP:   Recent Labs   Lab 11/13/19  0653      K 4.2      CO2 18*   *   BUN 16   CREATININE 1.1   CALCIUM 9.5   PROT 8.0   ALBUMIN 4.3   BILITOT 1.7*   ALKPHOS 89   AST 44*   ALT 37   ANIONGAP 19*   EGFRNONAA >60     Cardiac Markers:   Recent Labs   Lab 11/13/19  0653   *       Significant Imaging: I have reviewed all pertinent imaging results/findings within the past 24 hours.   Imaging Results          X-Ray Chest AP Portable (Final result)  Result time 11/13/19 07:24:13    Final result by Andrez Rodríguez MD (11/13/19 07:24:13)                 Impression:      Cardiomegaly.      Electronically signed by: Andrez Rodríguez MD  Date:    11/13/2019  Time:    07:24             Narrative:    EXAMINATION:  XR CHEST AP PORTABLE    CLINICAL HISTORY:  sob;    FINDINGS:  Single view of the chest.   Aorta demonstrates atherosclerotic disease.    Cardiac silhouette is enlarged but stable.  The lungs demonstrate no evidence of active disease.  No evidence of pleural effusion or pneumothorax.  Bones appear intact.                                  Assessment/Plan:     * Acute on chronic systolic congestive heart failure  -Follow-up echocardiogram.  -IV diuretics.  -Continue carvedilol and digoxin.  -Fluid restriction and cardiac diet.  -Monitor volume status with daily weights and strict intake and output.  -Cardiology consult.      Atrial  fibrillation with RVR  -Rate improved with IV Cardizem  -Restart digoxin.  -Continue amiodarone, Coreg and Coumadin.  -INR therapeutic.  -Cardiology consult.    Essential hypertension  -BP stable.  -Continue lisinopril and Coreg.      Multiple gouty tophi  -Stable.  -Continue allopurinol.      VTE Risk Mitigation (From admission, onward)         Ordered     warfarin (COUMADIN) tablet 1 mg  Daily      11/13/19 1006     IP VTE HIGH RISK PATIENT  Once      11/13/19 1006                   FRANCO Leal  Department of Hospital Medicine   [facility]

## 2019-11-13 NOTE — ED NOTES
Pt lying in bed in NAD,VSS,RR equal and unlabored. Bed is low, locked, and call light in reach. Side rails up x 2.  Wife at bedside.

## 2019-11-13 NOTE — CONSULTS
[facility]  Cardiology  Consult Note    Patient Name: Ute Mcbride  MRN: [id number]  Admission Date: 11/13/2019  Hospital Length of Stay: 0 days  Code Status: Full Code   Attending Provider: Delfino Carbone MD   Consulting Provider: KIARRA Smallwood  Primary Care Physician: Kenneth Redding MD  Principal Problem:Acute on chronic systolic congestive heart failure    Patient information was obtained from patient, spouse/SO, past medical records and ER records.     Inpatient consult to Cardiology  Consult performed by: KIARRA Ramírez  Consult ordered by: FRANCO Leal        Subjective:     Chief Complaint:  Shortness of breath     HPI:   Ute Mcbride is a 64 year old male who presented to [facility] due to shortness of breath over the past few days. He reports noncompliance since Saturday and has drank 12 beers over the weekend. His current medical conditions include Chronic combined systolic/diastolic CHF, HFrEF of 20-25%, AFIB on Coumadin, Gout, ETOH use, HTN, HLP. He reports that he ran out of medications a few days ago as well. ED workup revealed , INR 2.9, T BILI 1.7, H/H 13/43, Troponin 0.01. He was placed in observation under the care of hospital medicine. Cardiology consulted to assist with management. Chart reviewed. Patient seen and examined today in ED. Denies chest pain on exam today. He reports improvement in shortness of breath today at time of exam. Continues to endorse abdominal bloating today. ECHO pending. Has history of failed Ablation for AFIB in the past. Will resume home medications and aggressively diurese today. Further recs to follow.     Past Medical History:   Diagnosis Date    Anticoagulant long-term use     but has been noncompliant    Arthritis     Atrial fibrillation, chronic     Cardiomyopathy, nonischemic 11/9/2014    Coronary artery disease     Encounter for blood transfusion     Gout     Hypertension     Systolic heart failure         Past Surgical History:   Procedure Laterality Date    ABLATION N/A 8/15/2018    Procedure: ABLATION;  Surgeon: Mario Lou MD;  Location: [facility] CATH LAB;  Service: Cardiology;  Laterality: N/A;  AF, PEGGY, PVI, RFA, RUDDY, Gen, MB, 3 Prep    ANKLE SURGERY      CARDIAC CATHETERIZATION      CATARACT EXTRACTION W/  INTRAOCULAR LENS IMPLANT Bilateral        Review of patient's allergies indicates:  No Known Allergies    No current facility-administered medications on file prior to encounter.      Current Outpatient Medications on File Prior to Encounter   Medication Sig    allopurinol (ZYLOPRIM) 300 MG tablet Take 300 mg by mouth once daily.     amiodarone (PACERONE) 200 MG Tab Take 1 tablet (200 mg total) by mouth once daily.    carvedilol (COREG) 6.25 MG tablet Take 1 tablet (6.25 mg total) by mouth 2 (two) times daily with meals.    digoxin (LANOXIN) 250 mcg tablet Take 1 tablet (250 mcg total) by mouth once daily.    isosorbide mononitrate (IMDUR) 60 MG 24 hr tablet Take 1 tablet (60 mg total) by mouth once daily.    lisinopril (PRINIVIL,ZESTRIL) 40 MG tablet Take 1 tablet (40 mg total) by mouth once daily.    mupirocin (BACTROBAN) 2 % ointment Apply topically 3 (three) times daily.    pantoprazole (PROTONIX) 40 MG tablet Take 1 tablet (40 mg total) by mouth once daily.    warfarin (COUMADIN) 2 MG tablet Take 1 tablet on Tuesdays and 1/2 tablet on all other day by mouth every evening as directed by coumadin clinic. (Patient taking differently: Take 1 tablet every Tuesday and Thursday and 1/2 tablet on all other day by mouth every evening as directed by coumadin clinic.)    [DISCONTINUED] allopurinol (ZYLOPRIM) 100 MG tablet Take 1 tablet (100 mg total) by mouth once daily.    furosemide (LASIX) 40 MG tablet Take 1 tablet (40 mg total) by mouth 2 (two) times daily.     Family History     Problem Relation (Age of Onset)    Hypertension Mother, Father    Stroke Mother        Tobacco Use     Smoking status: Former Smoker     Last attempt to quit: 1994     Years since quittin.8    Smokeless tobacco: Never Used   Substance and Sexual Activity    Alcohol use: Yes     Alcohol/week: 3.0 standard drinks     Types: 3 Cans of beer per week     Frequency: 2-3 times a week     Drinks per session: 3 or 4    Drug use: No    Sexual activity: Not on file     Review of Systems   Constitution: Positive for malaise/fatigue.   HENT: Negative for hearing loss and hoarse voice.    Eyes: Negative for blurred vision and visual disturbance.   Cardiovascular: Positive for dyspnea on exertion and orthopnea. Negative for chest pain, claudication, irregular heartbeat, leg swelling, near-syncope, palpitations, paroxysmal nocturnal dyspnea and syncope.   Respiratory: Positive for cough and shortness of breath. Negative for hemoptysis, sleep disturbances due to breathing, snoring and wheezing.    Endocrine: Negative for cold intolerance and heat intolerance.   Hematologic/Lymphatic: Does not bruise/bleed easily.   Skin: Negative for color change, dry skin and nail changes.   Musculoskeletal: Positive for arthritis, back pain, gout (bilateral hand deformities) and joint pain. Negative for myalgias.   Gastrointestinal: Positive for bloating. Negative for abdominal pain, constipation, nausea and vomiting.   Genitourinary: Negative for dysuria, flank pain, hematuria and hesitancy.   Neurological: Negative for headaches, light-headedness, loss of balance, numbness, paresthesias and weakness.   Psychiatric/Behavioral: Negative for altered mental status.   Allergic/Immunologic: Negative for environmental allergies.     Objective:     Vital Signs (Most Recent):  Temp: 97.7 °F (36.5 °C) (19 06)  Pulse: 99 (19 111)  Resp: 20 (19 111)  BP: (!) 159/82 (19 1117)  SpO2: 97 % (19 111) Vital Signs (24h Range):  Temp:  [97.7 °F (36.5 °C)] 97.7 °F (36.5 °C)  Pulse:  [] 99  Resp:  [18-20] 20  SpO2:   [95 %-99 %] 97 %  BP: (125-165)/() 159/82     Weight: 81.1 kg (178 lb 12.8 oz)  Body mass index is 28.86 kg/m².    SpO2: 97 %  O2 Device (Oxygen Therapy): room air      Intake/Output Summary (Last 24 hours) at 11/13/2019 1141  Last data filed at 11/13/2019 1000  Gross per 24 hour   Intake --   Output 650 ml   Net -650 ml       Lines/Drains/Airways     Peripheral Intravenous Line                 Peripheral IV - Single Lumen 11/13/19 0656 20 G Right Forearm less than 1 day                Physical Exam   Constitutional: He is oriented to person, place, and time. He appears well-developed and well-nourished. No distress.   HENT:   Head: Normocephalic and atraumatic.   Eyes: Pupils are equal, round, and reactive to light.   Neck: Normal range of motion and full passive range of motion without pain. Neck supple. No JVD present.   Cardiovascular: S1 normal, S2 normal and intact distal pulses. An irregularly irregular rhythm present. Tachycardia present. PMI is not displaced. Exam reveals no distant heart sounds.   No murmur heard.  Pulses:       Radial pulses are 2+ on the right side, and 2+ on the left side.        Dorsalis pedis pulses are 1+ on the right side, and 1+ on the left side.   Remains in AFIB, variable rate control   Pulmonary/Chest: Effort normal. No accessory muscle usage. No respiratory distress. He has decreased breath sounds in the right lower field and the left lower field. He has no wheezes. He has no rales.   Coarse lung sounds bilaterally     Abdominal: Soft. Bowel sounds are normal. He exhibits distension. There is no tenderness.   Musculoskeletal: Normal range of motion. He exhibits edema.        Right ankle: He exhibits swelling.        Left ankle: He exhibits swelling.   Neurological: He is alert and oriented to person, place, and time.   Skin: Skin is warm and dry. He is not diaphoretic. No cyanosis. Nails show no clubbing.   Psychiatric: He has a normal mood and affect. His speech is  normal and behavior is normal. Judgment and thought content normal. Cognition and memory are normal.   Nursing note and vitals reviewed.      Significant Labs:   BMP:   Recent Labs   Lab 11/13/19  0653 11/13/19  1114   *  --      --    K 4.2  --      --    CO2 18*  --    BUN 16  --    CREATININE 1.1  --    CALCIUM 9.5  --    MG  --  1.5*   , CBC   Recent Labs   Lab 11/13/19  0653   WBC 7.17   HGB 13.7*   HCT 43.3      , INR   Recent Labs   Lab 11/13/19  0653   INR 2.9*   , Troponin   Recent Labs   Lab 11/13/19  0653   TROPONINI 0.010    and All pertinent lab results from the last 24 hours have been reviewed.    Significant Imaging: Echocardiogram:   2D echo with color flow doppler:   Results for orders placed or performed during the hospital encounter of 11/13/19   2D echo with color flow doppler    Narrative    This study is in progress....    and X-Ray: CXR: X-Ray Chest 1 View (CXR): No results found for this visit on 11/13/19. and X-Ray Chest PA and Lateral (CXR): No results found for this visit on 11/13/19.    Assessment and Plan:     * Acute on chronic systolic congestive heart failure  Previous EF 20-25%, RVE with mildly depressed RV systolic fn  Repeat ECHO pending  Needs aggressive IV diuresis  Continue Coreg, IV lasix BID, ACEi  Dash diet, 2 gm sodium restriction  1200 ml fluid restriction  Daily weights  Reinforced importance of compliance with medications and dietary restrictions  Discussed importance of alcohol cessation  Reassess in AM    ETOH abuse  Encourage cessation    Essential hypertension  -on medical therapy  Continue BB, ACEi, Imdur    Atrial fibrillation with RVR  -Continue Amiodarone, Coumadin, BB, Digoxin  -No CNS complaints to suggest TIA or CVA today  -No signs of abnormal bleeding  -Check Amio level  -Pharmacy to dose Coumadin        VTE Risk Mitigation (From admission, onward)         Ordered     warfarin (COUMADIN) tablet 1 mg  Daily      11/13/19 1006     IP  VTE HIGH RISK PATIENT  Once      11/13/19 1006                Thank you for your consult. I will follow-up with patient. Please contact us if you have any additional questions.    BYRON Smallwood-KELLY  Cardiology   [facility]

## 2019-11-13 NOTE — SUBJECTIVE & OBJECTIVE
Past Medical History:   Diagnosis Date    Anticoagulant long-term use     but has been noncompliant    Arthritis     Atrial fibrillation, chronic     Cardiomyopathy, nonischemic 11/9/2014    Coronary artery disease     Encounter for blood transfusion     Gout     Hypertension     Systolic heart failure        Past Surgical History:   Procedure Laterality Date    ABLATION N/A 8/15/2018    Procedure: ABLATION;  Surgeon: Mario Lou MD;  Location: [facility] CATH LAB;  Service: Cardiology;  Laterality: N/A;  AF, PEGGY, PVI, RFA, RUDDY, Gen, MB, 3 Prep    ANKLE SURGERY      CARDIAC CATHETERIZATION      CATARACT EXTRACTION W/  INTRAOCULAR LENS IMPLANT Bilateral        Review of patient's allergies indicates:  No Known Allergies    No current facility-administered medications on file prior to encounter.      Current Outpatient Medications on File Prior to Encounter   Medication Sig    allopurinol (ZYLOPRIM) 300 MG tablet Take 300 mg by mouth once daily.     amiodarone (PACERONE) 200 MG Tab Take 1 tablet (200 mg total) by mouth once daily.    carvedilol (COREG) 6.25 MG tablet Take 1 tablet (6.25 mg total) by mouth 2 (two) times daily with meals.    digoxin (LANOXIN) 250 mcg tablet Take 1 tablet (250 mcg total) by mouth once daily.    isosorbide mononitrate (IMDUR) 60 MG 24 hr tablet Take 1 tablet (60 mg total) by mouth once daily.    lisinopril (PRINIVIL,ZESTRIL) 40 MG tablet Take 1 tablet (40 mg total) by mouth once daily.    mupirocin (BACTROBAN) 2 % ointment Apply topically 3 (three) times daily.    pantoprazole (PROTONIX) 40 MG tablet Take 1 tablet (40 mg total) by mouth once daily.    warfarin (COUMADIN) 2 MG tablet Take 1 tablet on Tuesdays and 1/2 tablet on all other day by mouth every evening as directed by coumadin clinic. (Patient taking differently: Take 1 tablet every Tuesday and Thursday and 1/2 tablet on all other day by mouth every evening as directed by coumadin clinic.)     [DISCONTINUED] allopurinol (ZYLOPRIM) 100 MG tablet Take 1 tablet (100 mg total) by mouth once daily.    furosemide (LASIX) 40 MG tablet Take 1 tablet (40 mg total) by mouth 2 (two) times daily.     Family History     Problem Relation (Age of Onset)    Hypertension Mother, Father    Stroke Mother        Tobacco Use    Smoking status: Former Smoker     Last attempt to quit: 1994     Years since quittin.8    Smokeless tobacco: Never Used   Substance and Sexual Activity    Alcohol use: Yes     Alcohol/week: 3.0 standard drinks     Types: 3 Cans of beer per week     Frequency: 2-3 times a week     Drinks per session: 3 or 4    Drug use: No    Sexual activity: Not on file     Review of Systems   Constitution: Positive for malaise/fatigue.   HENT: Negative for hearing loss and hoarse voice.    Eyes: Negative for blurred vision and visual disturbance.   Cardiovascular: Positive for dyspnea on exertion and orthopnea. Negative for chest pain, claudication, irregular heartbeat, leg swelling, near-syncope, palpitations, paroxysmal nocturnal dyspnea and syncope.   Respiratory: Positive for cough and shortness of breath. Negative for hemoptysis, sleep disturbances due to breathing, snoring and wheezing.    Endocrine: Negative for cold intolerance and heat intolerance.   Hematologic/Lymphatic: Does not bruise/bleed easily.   Skin: Negative for color change, dry skin and nail changes.   Musculoskeletal: Positive for arthritis, back pain, gout (bilateral hand deformities) and joint pain. Negative for myalgias.   Gastrointestinal: Positive for bloating. Negative for abdominal pain, constipation, nausea and vomiting.   Genitourinary: Negative for dysuria, flank pain, hematuria and hesitancy.   Neurological: Negative for headaches, light-headedness, loss of balance, numbness, paresthesias and weakness.   Psychiatric/Behavioral: Negative for altered mental status.   Allergic/Immunologic: Negative for environmental  allergies.     Objective:     Vital Signs (Most Recent):  Temp: 97.7 °F (36.5 °C) (11/13/19 0621)  Pulse: 99 (11/13/19 1117)  Resp: 20 (11/13/19 1117)  BP: (!) 159/82 (11/13/19 1117)  SpO2: 97 % (11/13/19 1117) Vital Signs (24h Range):  Temp:  [97.7 °F (36.5 °C)] 97.7 °F (36.5 °C)  Pulse:  [] 99  Resp:  [18-20] 20  SpO2:  [95 %-99 %] 97 %  BP: (125-165)/() 159/82     Weight: 81.1 kg (178 lb 12.8 oz)  Body mass index is 28.86 kg/m².    SpO2: 97 %  O2 Device (Oxygen Therapy): room air      Intake/Output Summary (Last 24 hours) at 11/13/2019 1141  Last data filed at 11/13/2019 1000  Gross per 24 hour   Intake --   Output 650 ml   Net -650 ml       Lines/Drains/Airways     Peripheral Intravenous Line                 Peripheral IV - Single Lumen 11/13/19 0656 20 G Right Forearm less than 1 day                Physical Exam   Constitutional: He is oriented to person, place, and time. He appears well-developed and well-nourished. No distress.   HENT:   Head: Normocephalic and atraumatic.   Eyes: Pupils are equal, round, and reactive to light.   Neck: Normal range of motion and full passive range of motion without pain. Neck supple. No JVD present.   Cardiovascular: S1 normal, S2 normal and intact distal pulses. An irregularly irregular rhythm present. Tachycardia present. PMI is not displaced. Exam reveals no distant heart sounds.   No murmur heard.  Pulses:       Radial pulses are 2+ on the right side, and 2+ on the left side.        Dorsalis pedis pulses are 1+ on the right side, and 1+ on the left side.   Remains in AFIB, variable rate control   Pulmonary/Chest: Effort normal. No accessory muscle usage. No respiratory distress. He has decreased breath sounds in the right lower field and the left lower field. He has no wheezes. He has no rales.   Coarse lung sounds bilaterally     Abdominal: Soft. Bowel sounds are normal. He exhibits distension. There is no tenderness.   Musculoskeletal: Normal range of  motion. He exhibits edema.        Right ankle: He exhibits swelling.        Left ankle: He exhibits swelling.   Neurological: He is alert and oriented to person, place, and time.   Skin: Skin is warm and dry. He is not diaphoretic. No cyanosis. Nails show no clubbing.   Psychiatric: He has a normal mood and affect. His speech is normal and behavior is normal. Judgment and thought content normal. Cognition and memory are normal.   Nursing note and vitals reviewed.      Significant Labs:   BMP:   Recent Labs   Lab 11/13/19  0653 11/13/19  1114   *  --      --    K 4.2  --      --    CO2 18*  --    BUN 16  --    CREATININE 1.1  --    CALCIUM 9.5  --    MG  --  1.5*   , CBC   Recent Labs   Lab 11/13/19  0653   WBC 7.17   HGB 13.7*   HCT 43.3      , INR   Recent Labs   Lab 11/13/19  0653   INR 2.9*   , Troponin   Recent Labs   Lab 11/13/19  0653   TROPONINI 0.010    and All pertinent lab results from the last 24 hours have been reviewed.    Significant Imaging: Echocardiogram:   2D echo with color flow doppler:   Results for orders placed or performed during the hospital encounter of 11/13/19   2D echo with color flow doppler    Narrative    This study is in progress....    and X-Ray: CXR: X-Ray Chest 1 View (CXR): No results found for this visit on 11/13/19. and X-Ray Chest PA and Lateral (CXR): No results found for this visit on 11/13/19.

## 2019-11-14 LAB
ANION GAP SERPL CALC-SCNC: 13 MMOL/L (ref 8–16)
BASOPHILS # BLD AUTO: 0.04 K/UL (ref 0–0.2)
BASOPHILS NFR BLD: 1 % (ref 0–1.9)
BUN SERPL-MCNC: 17 MG/DL (ref 8–23)
CALCIUM SERPL-MCNC: 8.8 MG/DL (ref 8.7–10.5)
CHLORIDE SERPL-SCNC: 103 MMOL/L (ref 95–110)
CO2 SERPL-SCNC: 25 MMOL/L (ref 23–29)
CREAT SERPL-MCNC: 1.1 MG/DL (ref 0.5–1.4)
DIFFERENTIAL METHOD: ABNORMAL
EOSINOPHIL # BLD AUTO: 0.3 K/UL (ref 0–0.5)
EOSINOPHIL NFR BLD: 6.2 % (ref 0–8)
ERYTHROCYTE [DISTWIDTH] IN BLOOD BY AUTOMATED COUNT: 14.3 % (ref 11.5–14.5)
EST. GFR  (AFRICAN AMERICAN): >60 ML/MIN/1.73 M^2
EST. GFR  (NON AFRICAN AMERICAN): >60 ML/MIN/1.73 M^2
ESTIMATED AVG GLUCOSE: 111 MG/DL (ref 68–131)
GLUCOSE SERPL-MCNC: 102 MG/DL (ref 70–110)
HBA1C MFR BLD HPLC: 5.5 % (ref 4–5.6)
HCT VFR BLD AUTO: 37.4 % (ref 40–54)
HGB BLD-MCNC: 11.5 G/DL (ref 14–18)
IMM GRANULOCYTES # BLD AUTO: 0.02 K/UL (ref 0–0.04)
IMM GRANULOCYTES NFR BLD AUTO: 0.5 % (ref 0–0.5)
INR PPP: 2.2 (ref 0.8–1.2)
LYMPHOCYTES # BLD AUTO: 1 K/UL (ref 1–4.8)
LYMPHOCYTES NFR BLD: 23.6 % (ref 18–48)
MAGNESIUM SERPL-MCNC: 1.8 MG/DL (ref 1.6–2.6)
MCH RBC QN AUTO: 29.6 PG (ref 27–31)
MCHC RBC AUTO-ENTMCNC: 30.7 G/DL (ref 32–36)
MCV RBC AUTO: 96 FL (ref 82–98)
MONOCYTES # BLD AUTO: 0.5 K/UL (ref 0.3–1)
MONOCYTES NFR BLD: 10.7 % (ref 4–15)
NEUTROPHILS # BLD AUTO: 2.4 K/UL (ref 1.8–7.7)
NEUTROPHILS NFR BLD: 58 % (ref 38–73)
NRBC BLD-RTO: 0 /100 WBC
PHOSPHATE SERPL-MCNC: 3.8 MG/DL (ref 2.7–4.5)
PLATELET # BLD AUTO: 125 K/UL (ref 150–350)
PMV BLD AUTO: 10.1 FL (ref 9.2–12.9)
POTASSIUM SERPL-SCNC: 3.3 MMOL/L (ref 3.5–5.1)
PROTHROMBIN TIME: 23 SEC (ref 9–12.5)
RBC # BLD AUTO: 3.88 M/UL (ref 4.6–6.2)
SODIUM SERPL-SCNC: 141 MMOL/L (ref 136–145)
WBC # BLD AUTO: 4.2 K/UL (ref 3.9–12.7)

## 2019-11-14 PROCEDURE — 63600175 PHARM REV CODE 636 W HCPCS: Performed by: NURSE PRACTITIONER

## 2019-11-14 PROCEDURE — 96376 TX/PRO/DX INJ SAME DRUG ADON: CPT

## 2019-11-14 PROCEDURE — 80048 BASIC METABOLIC PNL TOTAL CA: CPT

## 2019-11-14 PROCEDURE — 25000003 PHARM REV CODE 250: Performed by: PHYSICIAN ASSISTANT

## 2019-11-14 PROCEDURE — 36415 COLL VENOUS BLD VENIPUNCTURE: CPT

## 2019-11-14 PROCEDURE — 85610 PROTHROMBIN TIME: CPT

## 2019-11-14 PROCEDURE — G0378 HOSPITAL OBSERVATION PER HR: HCPCS

## 2019-11-14 PROCEDURE — 25000003 PHARM REV CODE 250: Performed by: NURSE PRACTITIONER

## 2019-11-14 PROCEDURE — 85025 COMPLETE CBC W/AUTO DIFF WBC: CPT

## 2019-11-14 PROCEDURE — 84100 ASSAY OF PHOSPHORUS: CPT

## 2019-11-14 PROCEDURE — 83735 ASSAY OF MAGNESIUM: CPT

## 2019-11-14 PROCEDURE — 25000003 PHARM REV CODE 250: Performed by: INTERNAL MEDICINE

## 2019-11-14 RX ADMIN — ISOSORBIDE MONONITRATE 60 MG: 60 TABLET, EXTENDED RELEASE ORAL at 04:11

## 2019-11-14 RX ADMIN — AMIODARONE HYDROCHLORIDE 200 MG: 200 TABLET ORAL at 04:11

## 2019-11-14 RX ADMIN — ALLOPURINOL 300 MG: 300 TABLET ORAL at 04:11

## 2019-11-14 RX ADMIN — CARVEDILOL 12.5 MG: 12.5 TABLET, FILM COATED ORAL at 04:11

## 2019-11-14 RX ADMIN — WARFARIN SODIUM 2 MG: 2 TABLET ORAL at 06:11

## 2019-11-14 RX ADMIN — DIGOXIN 250 MCG: 125 TABLET ORAL at 04:11

## 2019-11-14 RX ADMIN — FUROSEMIDE 60 MG: 10 INJECTION, SOLUTION INTRAMUSCULAR; INTRAVENOUS at 05:11

## 2019-11-14 NOTE — PROGRESS NOTES
At 1630, patient requested to see someone.  Patient reports that no nurse has seen him or given him his morning medications today.  Upon further investigation, the patient was not assigned to a nurse on the staffing sheets.  Dr Peters notified and updated.  New orders received and noted.  Anita Ramirez RN assumed care of this patient at this time.

## 2019-11-14 NOTE — ASSESSMENT & PLAN NOTE
-Echocardiogram showed EF 15% and moderate MVR.  -IV diuretics.  -Continue carvedilol and digoxin.  -Fluid restriction and cardiac diet.  -Monitor volume status with daily weights and strict intake and output.  -Cardiology consult.

## 2019-11-14 NOTE — SUBJECTIVE & OBJECTIVE
Interval History:  Abdominal distension and shortness of breath but tolerating minimal exertion.    Review of Systems   Constitutional: Negative.  Negative for chills and fever.   HENT: Negative.  Negative for congestion and sore throat.    Eyes: Negative.  Negative for visual disturbance.   Respiratory: Positive for shortness of breath. Negative for cough and wheezing.    Cardiovascular: Negative.  Negative for chest pain.   Gastrointestinal: Positive for abdominal distention. Negative for abdominal pain, diarrhea, nausea and vomiting.   Endocrine: Negative.    Genitourinary: Negative.    Musculoskeletal: Positive for joint swelling. Negative for myalgias and neck stiffness.   Skin: Negative.  Negative for color change and pallor.   Allergic/Immunologic: Negative.    Neurological: Negative.    Hematological: Negative.    Psychiatric/Behavioral: Negative.    All other systems reviewed and are negative.    Objective:     Vital Signs (Most Recent):  Temp: 98 °F (36.7 °C) (11/14/19 1539)  Pulse: 70 (11/14/19 1644)  Resp: 16 (11/14/19 1539)  BP: 122/84 (11/14/19 1539)  SpO2: 98 % (11/14/19 1539) Vital Signs (24h Range):  Temp:  [97.5 °F (36.4 °C)-98.5 °F (36.9 °C)] 98 °F (36.7 °C)  Pulse:  [55-76] 70  Resp:  [15-18] 16  SpO2:  [96 %-98 %] 98 %  BP: (118-124)/(71-84) 122/84     Weight: 76.2 kg (167 lb 15.9 oz)  Body mass index is 27.11 kg/m².    Intake/Output Summary (Last 24 hours) at 11/14/2019 1708  Last data filed at 11/14/2019 0600  Gross per 24 hour   Intake 350 ml   Output 2400 ml   Net -2050 ml      Physical Exam   Constitutional: He is oriented to person, place, and time. He appears well-developed and well-nourished. No distress.   HENT:   Head: Normocephalic and atraumatic.   Mouth/Throat: Oropharynx is clear and moist.   Eyes: Pupils are equal, round, and reactive to light. Conjunctivae and EOM are normal.   Neck: No JVD present. No thyromegaly present.   Cardiovascular: Normal rate, regular rhythm and normal  heart sounds. Exam reveals no gallop and no friction rub.   No murmur heard.  Pulmonary/Chest: Effort normal. No respiratory distress. He has no wheezes. He has no rales.   Fine bibasilar crackles.   Abdominal: Soft. Bowel sounds are normal. He exhibits no distension. There is no tenderness. There is no rebound and no guarding.   Musculoskeletal: Normal range of motion. He exhibits no edema or tenderness.   Trace pre-tibial edema.  Extensive tophaceous gouty lesion in all joints of the hands.   Lymphadenopathy:     He has no cervical adenopathy.   Neurological: He is alert and oriented to person, place, and time. He has normal reflexes. He displays normal reflexes. No cranial nerve deficit.   Skin: Skin is warm and dry. No rash noted. He is not diaphoretic. No erythema.   Psychiatric: He has a normal mood and affect. His behavior is normal. Judgment and thought content normal.       Significant Labs: All pertinent labs within the past 24 hours have been reviewed.    Significant Imaging: I have reviewed all pertinent imaging results/findings within the past 24 hours.

## 2019-11-14 NOTE — PLAN OF CARE
"Care plan reviewed with patient and spouse. Still on lasix. Able to ambulate without any SOB. Verbalized that "abdominal swelling has been better." No complaints made.  "

## 2019-11-14 NOTE — PROGRESS NOTES
[facility] Hospital Medicine  Progress Note    Patient Name: Ute Mcbride  MRN: [id number]  Patient Class: OP- Observation   Admission Date: 11/13/2019  Length of Stay: 0 days  Attending Physician: Josué Peters MD  Primary Care Provider: Kenneth Redding MD        Subjective:     Principal Problem:Acute on chronic systolic congestive heart failure    HPI:  Ute Mcbride is a 64-year-old male with atrial fibrillation, systolic heart failure, CAD and HTN that presented to the ED with a chief complaint of abdominal swelling. He stated that it started on the morning of 11/12/19 and worsened that night. Associated symptoms included shortness of breath that was worse with activity and palpitations. He denied leg swelling, orthopnea, fever, cough, chest pain, abdominal pain, nausea, vomiting, constipation and diarrhea. Of note, the patient stated that he ran out of his digoxin about 2 weeks prior to presentation. In the ED, labs were remarkable for a BNP of 850. Chest X-rayed showed cardiomegaly. EKG showed atrial fibrillation with RVR.    Overview/Hospital Course:  Placed in observation for evaluation and treatment of abdominal swelling and shortness of breath due to heart failure exacerbation.    Interval History:  Abdominal distension and shortness of breath but tolerating minimal exertion.    Review of Systems   Constitutional: Negative.  Negative for chills and fever.   HENT: Negative.  Negative for congestion and sore throat.    Eyes: Negative.  Negative for visual disturbance.   Respiratory: Positive for shortness of breath. Negative for cough and wheezing.    Cardiovascular: Negative.  Negative for chest pain.   Gastrointestinal: Positive for abdominal distention. Negative for abdominal pain, diarrhea, nausea and vomiting.   Endocrine: Negative.    Genitourinary: Negative.    Musculoskeletal: Positive for joint swelling. Negative for myalgias and neck stiffness.   Skin: Negative.   Negative for color change and pallor.   Allergic/Immunologic: Negative.    Neurological: Negative.    Hematological: Negative.    Psychiatric/Behavioral: Negative.    All other systems reviewed and are negative.    Objective:     Vital Signs (Most Recent):  Temp: 98 °F (36.7 °C) (11/14/19 1539)  Pulse: 70 (11/14/19 1644)  Resp: 16 (11/14/19 1539)  BP: 122/84 (11/14/19 1539)  SpO2: 98 % (11/14/19 1539) Vital Signs (24h Range):  Temp:  [97.5 °F (36.4 °C)-98.5 °F (36.9 °C)] 98 °F (36.7 °C)  Pulse:  [55-76] 70  Resp:  [15-18] 16  SpO2:  [96 %-98 %] 98 %  BP: (118-124)/(71-84) 122/84     Weight: 76.2 kg (167 lb 15.9 oz)  Body mass index is 27.11 kg/m².    Intake/Output Summary (Last 24 hours) at 11/14/2019 1708  Last data filed at 11/14/2019 0600  Gross per 24 hour   Intake 350 ml   Output 2400 ml   Net -2050 ml      Physical Exam   Constitutional: He is oriented to person, place, and time. He appears well-developed and well-nourished. No distress.   HENT:   Head: Normocephalic and atraumatic.   Mouth/Throat: Oropharynx is clear and moist.   Eyes: Pupils are equal, round, and reactive to light. Conjunctivae and EOM are normal.   Neck: No JVD present. No thyromegaly present.   Cardiovascular: Normal rate, regular rhythm and normal heart sounds. Exam reveals no gallop and no friction rub.   No murmur heard.  Pulmonary/Chest: Effort normal. No respiratory distress. He has no wheezes. He has no rales.   Fine bibasilar crackles.   Abdominal: Soft. Bowel sounds are normal. He exhibits no distension. There is no tenderness. There is no rebound and no guarding.   Musculoskeletal: Normal range of motion. He exhibits no edema or tenderness.   Trace pre-tibial edema.  Extensive tophaceous gouty lesion in all joints of the hands.   Lymphadenopathy:     He has no cervical adenopathy.   Neurological: He is alert and oriented to person, place, and time. He has normal reflexes. He displays normal reflexes. No cranial nerve deficit.    Skin: Skin is warm and dry. No rash noted. He is not diaphoretic. No erythema.   Psychiatric: He has a normal mood and affect. His behavior is normal. Judgment and thought content normal.       Significant Labs: All pertinent labs within the past 24 hours have been reviewed.    Significant Imaging: I have reviewed all pertinent imaging results/findings within the past 24 hours.      Assessment/Plan:      * Acute on chronic systolic congestive heart failure  -Echocardiogram showed EF 15% and moderate MVR.  -IV diuretics.  -Continue carvedilol and digoxin.  -Fluid restriction and cardiac diet.  -Monitor volume status with daily weights and strict intake and output.  -Cardiology consult.    Essential hypertension  -BP stable.  -Continue lisinopril and Coreg.      Multiple gouty tophi  -Stable.  -Continue allopurinol.      Atrial fibrillation with RVR  -Rate improved with IV Cardizem  -Restart digoxin.  -Continue amiodarone, Coreg and Coumadin.  -INR therapeutic.  -Cardiology consult.      VTE Risk Mitigation (From admission, onward)         Ordered     warfarin (COUMADIN) tablet 1 mg  Every Sat, Sun      11/13/19 1600     warfarin (COUMADIN) tablet 2 mg  Every Tues, Thurs      11/13/19 1600     warfarin (COUMADIN) tablet 1 mg  Every Mon, Wed, Fri      11/13/19 1006     IP VTE HIGH RISK PATIENT  Once      11/13/19 1006                      Josué Peters MD  Department of Hospital Medicine   [facility]

## 2019-11-14 NOTE — PLAN OF CARE
"Pt AAO x4.  VSS.  Pt remained afebrile throughout this shift.   All meds administered per order.   Pt remained free of falls this shift.   Pt had no c/o pain this shift.  Plan of care reviewed. Patient verbalizes understanding.   Pt moving/turning independently.   Patient AFIB on monitor.   Bed low, side rails up x 2, wheels locked, call light in reach.   Patient instructed to call for assistance.   Hourly rounding completed.   Will continue to monitor.    /75 (BP Location: Left arm, Patient Position: Lying)   Pulse 76 Comment: Simultaneous filing. User may not have seen previous data.  Temp 97.8 °F (36.6 °C) (Oral)   Resp 15   Ht 5' 6" (1.676 m)   Wt 81.1 kg (178 lb 12.8 oz)   SpO2 97%   BMI 28.86 kg/m²     "

## 2019-11-15 ENCOUNTER — TELEPHONE (OUTPATIENT)
Dept: CARDIOLOGY | Facility: CLINIC | Age: 64
End: 2019-11-15

## 2019-11-15 VITALS
SYSTOLIC BLOOD PRESSURE: 121 MMHG | TEMPERATURE: 99 F | BODY MASS INDEX: 26.72 KG/M2 | HEIGHT: 66 IN | WEIGHT: 166.25 LBS | RESPIRATION RATE: 16 BRPM | HEART RATE: 66 BPM | OXYGEN SATURATION: 97 % | DIASTOLIC BLOOD PRESSURE: 72 MMHG

## 2019-11-15 LAB
AMIODARONE SERPL-SCNC: 0.9 UG/ML (ref 1–3)
ANION GAP SERPL CALC-SCNC: 12 MMOL/L (ref 8–16)
BASOPHILS # BLD AUTO: 0.03 K/UL (ref 0–0.2)
BASOPHILS NFR BLD: 0.7 % (ref 0–1.9)
BUN SERPL-MCNC: 18 MG/DL (ref 8–23)
CALCIUM SERPL-MCNC: 8.6 MG/DL (ref 8.7–10.5)
CHLORIDE SERPL-SCNC: 101 MMOL/L (ref 95–110)
CO2 SERPL-SCNC: 26 MMOL/L (ref 23–29)
CREAT SERPL-MCNC: 1.1 MG/DL (ref 0.5–1.4)
DIFFERENTIAL METHOD: ABNORMAL
EOSINOPHIL # BLD AUTO: 0.2 K/UL (ref 0–0.5)
EOSINOPHIL NFR BLD: 5.3 % (ref 0–8)
ERYTHROCYTE [DISTWIDTH] IN BLOOD BY AUTOMATED COUNT: 14.4 % (ref 11.5–14.5)
EST. GFR  (AFRICAN AMERICAN): >60 ML/MIN/1.73 M^2
EST. GFR  (NON AFRICAN AMERICAN): >60 ML/MIN/1.73 M^2
GLUCOSE SERPL-MCNC: 90 MG/DL (ref 70–110)
HCT VFR BLD AUTO: 38.6 % (ref 40–54)
HGB BLD-MCNC: 11.8 G/DL (ref 14–18)
IMM GRANULOCYTES # BLD AUTO: 0.02 K/UL (ref 0–0.04)
IMM GRANULOCYTES NFR BLD AUTO: 0.5 % (ref 0–0.5)
INR PPP: 2.1 (ref 0.8–1.2)
LYMPHOCYTES # BLD AUTO: 1.1 K/UL (ref 1–4.8)
LYMPHOCYTES NFR BLD: 25.6 % (ref 18–48)
MAGNESIUM SERPL-MCNC: 1.8 MG/DL (ref 1.6–2.6)
MCH RBC QN AUTO: 30.3 PG (ref 27–31)
MCHC RBC AUTO-ENTMCNC: 30.6 G/DL (ref 32–36)
MCV RBC AUTO: 99 FL (ref 82–98)
MONOCYTES # BLD AUTO: 0.5 K/UL (ref 0.3–1)
MONOCYTES NFR BLD: 10.4 % (ref 4–15)
N-DESETHYL-AMIODARONE: 1.3 UG/ML
NEUTROPHILS # BLD AUTO: 2.5 K/UL (ref 1.8–7.7)
NEUTROPHILS NFR BLD: 57.5 % (ref 38–73)
NRBC BLD-RTO: 0 /100 WBC
PHOSPHATE SERPL-MCNC: 4.1 MG/DL (ref 2.7–4.5)
PLATELET # BLD AUTO: 128 K/UL (ref 150–350)
PMV BLD AUTO: 9.9 FL (ref 9.2–12.9)
POTASSIUM SERPL-SCNC: 3.4 MMOL/L (ref 3.5–5.1)
PROTHROMBIN TIME: 22.6 SEC (ref 9–12.5)
RBC # BLD AUTO: 3.9 M/UL (ref 4.6–6.2)
SODIUM SERPL-SCNC: 139 MMOL/L (ref 136–145)
WBC # BLD AUTO: 4.33 K/UL (ref 3.9–12.7)

## 2019-11-15 PROCEDURE — 99214 OFFICE O/P EST MOD 30 MIN: CPT | Mod: ,,, | Performed by: INTERNAL MEDICINE

## 2019-11-15 PROCEDURE — 25000003 PHARM REV CODE 250: Performed by: NURSE PRACTITIONER

## 2019-11-15 PROCEDURE — 36415 COLL VENOUS BLD VENIPUNCTURE: CPT

## 2019-11-15 PROCEDURE — 25000003 PHARM REV CODE 250: Performed by: INTERNAL MEDICINE

## 2019-11-15 PROCEDURE — 80048 BASIC METABOLIC PNL TOTAL CA: CPT

## 2019-11-15 PROCEDURE — G0378 HOSPITAL OBSERVATION PER HR: HCPCS

## 2019-11-15 PROCEDURE — 25000003 PHARM REV CODE 250: Performed by: PHYSICIAN ASSISTANT

## 2019-11-15 PROCEDURE — 63600175 PHARM REV CODE 636 W HCPCS: Performed by: NURSE PRACTITIONER

## 2019-11-15 PROCEDURE — 84100 ASSAY OF PHOSPHORUS: CPT

## 2019-11-15 PROCEDURE — 96376 TX/PRO/DX INJ SAME DRUG ADON: CPT

## 2019-11-15 PROCEDURE — 85610 PROTHROMBIN TIME: CPT

## 2019-11-15 PROCEDURE — 99214 PR OFFICE/OUTPT VISIT, EST, LEVL IV, 30-39 MIN: ICD-10-PCS | Mod: ,,, | Performed by: INTERNAL MEDICINE

## 2019-11-15 PROCEDURE — 85025 COMPLETE CBC W/AUTO DIFF WBC: CPT

## 2019-11-15 PROCEDURE — 83735 ASSAY OF MAGNESIUM: CPT

## 2019-11-15 RX ORDER — POTASSIUM CHLORIDE 20 MEQ/1
20 TABLET, EXTENDED RELEASE ORAL 3 TIMES DAILY
Status: DISCONTINUED | OUTPATIENT
Start: 2019-11-15 | End: 2019-11-15 | Stop reason: HOSPADM

## 2019-11-15 RX ADMIN — AMIODARONE HYDROCHLORIDE 200 MG: 200 TABLET ORAL at 08:11

## 2019-11-15 RX ADMIN — CARVEDILOL 12.5 MG: 12.5 TABLET, FILM COATED ORAL at 08:11

## 2019-11-15 RX ADMIN — POTASSIUM CHLORIDE 20 MEQ: 1500 TABLET, EXTENDED RELEASE ORAL at 08:11

## 2019-11-15 RX ADMIN — DIGOXIN 250 MCG: 125 TABLET ORAL at 08:11

## 2019-11-15 RX ADMIN — ALLOPURINOL 300 MG: 300 TABLET ORAL at 08:11

## 2019-11-15 RX ADMIN — PANTOPRAZOLE SODIUM 40 MG: 40 TABLET, DELAYED RELEASE ORAL at 08:11

## 2019-11-15 RX ADMIN — FUROSEMIDE 60 MG: 10 INJECTION, SOLUTION INTRAMUSCULAR; INTRAVENOUS at 08:11

## 2019-11-15 RX ADMIN — ISOSORBIDE MONONITRATE 60 MG: 60 TABLET, EXTENDED RELEASE ORAL at 08:11

## 2019-11-15 RX ADMIN — LISINOPRIL 40 MG: 20 TABLET ORAL at 08:11

## 2019-11-15 NOTE — ASSESSMENT & PLAN NOTE
Previous EF 20-25%, RVE with mildly depressed RV systolic fn  Repeat ECHO pending  Needs aggressive IV diuresis  Continue Coreg, IV lasix BID, ACEi  Dash diet, 2 gm sodium restriction  1200 ml fluid restriction  Daily weights  Reinforced importance of compliance with medications and dietary restrictions  Discussed importance of alcohol cessation  Reassess in AM    11/15  -Continue Coreg, Digoxin, Lasix, ACEi, Imdur, Amio  -OP Cardiology follow up for possible ICD implant  -Avoid ETOH in future  -OK to discharge home from cardiology standpoint

## 2019-11-15 NOTE — PROGRESS NOTES
"Clinical Pharmacy Progress Note: Coumadin Dosing and Monitoring     Goal INR: 2-3   Indication: Atrial fibrillation  Lab Results   Component Value Date    INR 2.1 (H) 11/15/2019    INR 2.2 (H) 11/14/2019    INR 2.9 (H) 11/13/2019     Patient has been educated by pharmacist this admission.   Current dose: Warfarin 2 mg every Tuesday and Thursday, and Warfarin 1 mg on all other days. (Same as home dosing regimen)  Recent dosing history: Patient received Warfarin 1 mg on 11/13 and 2 mg on 11/14.  Drug interactions: Amiodarone and allopurinol may increase risk of bleeding. Both are home meds per "prior to admission" medication list.    Plan:  - Continue current dose of Warfarin 2 mg every Tuesday and Thursday, and Warfarin 1 mg on all other days.   - Pharmacy will continue to monitor daily PT/INR. Dose adjustments will be made accordingly.      Thank you for allowing us to participate in this patient's care.      Diana Carrion PharmD 11/15/2019 7:34 AM  "

## 2019-11-15 NOTE — TELEPHONE ENCOUNTER
I have pt scheduled for next week 11/20. I called pt. Only 1 number listed for pt. Spouse # is same as home/mobile. Phone just rang. No voicemail to leave a message.

## 2019-11-15 NOTE — HOSPITAL COURSE
11/15/19--Patient seen and examined in room, sitting on side of bed. NO chest pain or SOB today. Feels much better today. Has diuresed very well.  Labs reviewed, mag 1.8, K+ 3.4, Cr 1.1. Ok to discharge home from cardiology standpoint. Needs close Cardiology follow up.

## 2019-11-15 NOTE — SUBJECTIVE & OBJECTIVE
Interval History: no acute issues noted o/n.     Review of Systems   Constitution: Positive for malaise/fatigue.   HENT: Negative for hearing loss and hoarse voice.    Eyes: Negative for blurred vision and visual disturbance.   Cardiovascular: Positive for dyspnea on exertion and orthopnea. Negative for chest pain, claudication, irregular heartbeat, leg swelling, near-syncope, palpitations, paroxysmal nocturnal dyspnea and syncope.   Respiratory: Positive for cough and shortness of breath. Negative for hemoptysis, sleep disturbances due to breathing, snoring and wheezing.    Endocrine: Negative for cold intolerance and heat intolerance.   Hematologic/Lymphatic: Does not bruise/bleed easily.   Skin: Negative for color change, dry skin and nail changes.   Musculoskeletal: Positive for arthritis, back pain, gout (bilateral hand deformities) and joint pain. Negative for myalgias.   Gastrointestinal: Positive for bloating. Negative for abdominal pain, constipation, nausea and vomiting.   Genitourinary: Negative for dysuria, flank pain, hematuria and hesitancy.   Neurological: Negative for headaches, light-headedness, loss of balance, numbness, paresthesias and weakness.   Psychiatric/Behavioral: Negative for altered mental status.   Allergic/Immunologic: Negative for environmental allergies.     Objective:     Vital Signs (Most Recent):  Temp: 98.7 °F (37.1 °C) (11/15/19 1107)  Pulse: 66 (11/15/19 1125)  Resp: 16 (11/15/19 1107)  BP: 121/72 (11/15/19 1107)  SpO2: 97 % (11/15/19 1107) Vital Signs (24h Range):  Temp:  [96.5 °F (35.8 °C)-98.7 °F (37.1 °C)] 98.7 °F (37.1 °C)  Pulse:  [51-80] 66  Resp:  [16-19] 16  SpO2:  [95 %-99 %] 97 %  BP: (110-122)/(62-84) 121/72     Weight: 75.4 kg (166 lb 3.6 oz)  Body mass index is 26.83 kg/m².     SpO2: 97 %  O2 Device (Oxygen Therapy): room air      Intake/Output Summary (Last 24 hours) at 11/15/2019 1150  Last data filed at 11/15/2019 0400  Gross per 24 hour   Intake 240 ml    Output 1850 ml   Net -1610 ml       Lines/Drains/Airways     Peripheral Intravenous Line                 Peripheral IV - Single Lumen 11/13/19 0656 20 G Right Forearm 2 days                Physical Exam   Constitutional: He is oriented to person, place, and time. He appears well-developed and well-nourished. No distress.   HENT:   Head: Normocephalic and atraumatic.   Eyes: Pupils are equal, round, and reactive to light.   Neck: Normal range of motion and full passive range of motion without pain. Neck supple. No JVD present.   Cardiovascular: Normal rate, S1 normal, S2 normal and intact distal pulses. An irregularly irregular rhythm present. PMI is not displaced. Exam reveals no distant heart sounds.   No murmur heard.  Pulses:       Radial pulses are 2+ on the right side, and 2+ on the left side.        Dorsalis pedis pulses are 1+ on the right side, and 1+ on the left side.   Remains in AFIB, variable rate control   Pulmonary/Chest: Effort normal. No accessory muscle usage. No respiratory distress. He has decreased breath sounds in the right lower field and the left lower field. He has no wheezes. He has no rales.   Coarse lung sounds bilaterally     Abdominal: Soft. Bowel sounds are normal. He exhibits no distension. There is no tenderness.   Musculoskeletal: Normal range of motion. He exhibits no edema.        Right ankle: He exhibits no swelling.        Left ankle: He exhibits no swelling.   Neurological: He is alert and oriented to person, place, and time.   Skin: Skin is warm and dry. He is not diaphoretic. No cyanosis. Nails show no clubbing.   Psychiatric: He has a normal mood and affect. His speech is normal and behavior is normal. Judgment and thought content normal. Cognition and memory are normal.   Nursing note and vitals reviewed.      Significant Labs:   BMP:   Recent Labs   Lab 11/14/19  0514 11/15/19  0525    90    139   K 3.3* 3.4*    101   CO2 25 26   BUN 17 18   CREATININE  1.1 1.1   CALCIUM 8.8 8.6*   MG 1.8 1.8   , CBC   Recent Labs   Lab 11/14/19  0514 11/15/19  0525   WBC 4.20 4.33   HGB 11.5* 11.8*   HCT 37.4* 38.6*   * 128*    and All pertinent lab results from the last 24 hours have been reviewed.    Significant Imaging: Echocardiogram:   2D echo with color flow doppler:   Results for orders placed or performed during the hospital encounter of 11/13/19   2D echo with color flow doppler   Result Value Ref Range    QEF 15 (A) 55 - 65    Mitral Valve Regurgitation MODERATE (A)     Est. PA Systolic Pressure 38.91     Tricuspid Valve Regurgitation MILD     Narrative    Date of Procedure: 11/13/2019        TEST DESCRIPTION   Technical Quality: This is a portable study performed at the patient's bedside. This is a technically challenging study. There is poor endocardial definition.     Aorta: The aortic root is normal in size, measuring 3.3 cm at sinotubular junction and 3.6 cm at Sinuses of Valsalva. The proximal ascending aorta is normal in size, measuring 3.1 cm across.     Left Atrium: The left atrial volume index is severely enlarged, measuring 74.41 cc/m2.     Left Ventricle: The left ventricle is normal in size, with an end-diastolic diameter of 5.7 cm, and an end-systolic diameter of 5.3 cm. LV wall thickness is normal, with the septum measuring 1.1 cm and the posterior wall measuring 1.5 cm across. Relative   wall thickness was increased at 0.53, and the LV mass index was increased at 204.5 g/m2 consistent with concentric left ventricular hypertrophy. There are no regional wall motion abnormalities. Left ventricular systolic function appears severely   depressed. Visually estimated ejection fraction is 15-20%. The LV Doppler derived stroke volume equals 38.0 ccs.     Diastolic indices:     Right Atrium: The right atrium is severely enlarged, measuring 7.0 cm in length and 4.0 cm in width in the apical view.     Right Ventricle: The right ventricle is moderately to  severely enlarged in size. Global right ventricular systolic function appears moderately depressed. There is abnormal septal motion consistent with RV pressure/volume overload. Tricuspid annular plane   systolic excursion (TAPSE) is 1.7 cm. The estimated PA systolic pressure is 39 mmHg.     Aortic Valve:  Aortic valve is normal in structure with normal leaflet mobility. The peak gradient obtained across the aortic valve is 5 mmHg, with a mean gradient of 4 mmHg.     Mitral Valve:  Mitral valve is normal in structure with normal leaflet mobility. The pressure half time is 32 msec. The calculated mitral valve area is 6.88 cm2. There is moderate mitral regurgitation.     Tricuspid Valve:  Tricuspid valve is normal in structure with normal leaflet mobility. There is mild tricuspid regurgitation.     Pulmonary Valve:  Pulmonary valve is normal in structure with normal leaflet mobility. There is trivial to mild pulmonic regurgitation.     IVC: IVC is enlarged but collapses > 50% with a sniff, suggesting intermediate right atrial pressure of 8 mmHg.     Atrial Septum: The atrial septum is intact.     Intracavitary: There is no evidence of pericardial effusion, intracavity mass, thrombi, or vegetation.         CONCLUSIONS     1 - Biatrial enlargement.     2 - Concentric hypertrophy.     3 - No wall motion abnormalities.     4 - Severely depressed left ventricular systolic function (EF 15-20%).     5 - Right ventricular enlargement with moderately depressed systolic function.     6 - The estimated PA systolic pressure is 39 mmHg.     7 - Moderate mitral regurgitation.     8 - Mild tricuspid regurgitation.     9 - Trivial to mild pulmonic regurgitation.     10 - Intermediate central venous pressure.             This document has been electronically    SIGNED BY: Ksenia Cullen MD On: 11/13/2019 12:29

## 2019-11-15 NOTE — PROGRESS NOTES
[facility] -   Cardiology  Progress Note    Patient Name: Ute Mcbride  MRN: [id number]  Admission Date: 11/13/2019  Hospital Length of Stay: 0 days  Code Status: Full Code   Attending Physician: Josué Peters MD   Primary Care Physician: Kenneth Redding MD  Expected Discharge Date:   Principal Problem:Acute on chronic systolic congestive heart failure    Subjective:   HPI  Ute Mcbride is a 64 year old male who presented to [facility] due to shortness of breath over the past few days. He reports noncompliance since Saturday and has drank 12 beers over the weekend. His current medical conditions include Chronic combined systolic/diastolic CHF, HFrEF of 20-25%, AFIB on Coumadin, Gout, ETOH use, HTN, HLP. He reports that he ran out of medications a few days ago as well. ED workup revealed , INR 2.9, T BILI 1.7, H/H 13/43, Troponin 0.01. He was placed in observation under the care of hospital medicine. Cardiology consulted to assist with management. Chart reviewed. Patient seen and examined today in ED. Denies chest pain on exam today. He reports improvement in shortness of breath today at time of exam. Continues to endorse abdominal bloating today. ECHO pending. Has history of failed Ablation for AFIB in the past. Will resume home medications and aggressively diurese today. Further recs to follow.       Hospital Course:   11/15/19--Patient seen and examined in room, sitting on side of bed. NO chest pain or SOB today. Feels much better today. Has diuresed very well.  Labs reviewed, mag 1.8, K+ 3.4, Cr 1.1. Ok to discharge home from cardiology standpoint. Needs close Cardiology follow up.     Interval History: no acute issues noted o/n.     Review of Systems   Constitution: Positive for malaise/fatigue.   HENT: Negative for hearing loss and hoarse voice.    Eyes: Negative for blurred vision and visual disturbance.   Cardiovascular: Positive for dyspnea on exertion and orthopnea. Negative  for chest pain, claudication, irregular heartbeat, leg swelling, near-syncope, palpitations, paroxysmal nocturnal dyspnea and syncope.   Respiratory: Positive for cough and shortness of breath. Negative for hemoptysis, sleep disturbances due to breathing, snoring and wheezing.    Endocrine: Negative for cold intolerance and heat intolerance.   Hematologic/Lymphatic: Does not bruise/bleed easily.   Skin: Negative for color change, dry skin and nail changes.   Musculoskeletal: Positive for arthritis, back pain, gout (bilateral hand deformities) and joint pain. Negative for myalgias.   Gastrointestinal: Positive for bloating. Negative for abdominal pain, constipation, nausea and vomiting.   Genitourinary: Negative for dysuria, flank pain, hematuria and hesitancy.   Neurological: Negative for headaches, light-headedness, loss of balance, numbness, paresthesias and weakness.   Psychiatric/Behavioral: Negative for altered mental status.   Allergic/Immunologic: Negative for environmental allergies.     Objective:     Vital Signs (Most Recent):  Temp: 98.7 °F (37.1 °C) (11/15/19 1107)  Pulse: 66 (11/15/19 1125)  Resp: 16 (11/15/19 1107)  BP: 121/72 (11/15/19 1107)  SpO2: 97 % (11/15/19 1107) Vital Signs (24h Range):  Temp:  [96.5 °F (35.8 °C)-98.7 °F (37.1 °C)] 98.7 °F (37.1 °C)  Pulse:  [51-80] 66  Resp:  [16-19] 16  SpO2:  [95 %-99 %] 97 %  BP: (110-122)/(62-84) 121/72     Weight: 75.4 kg (166 lb 3.6 oz)  Body mass index is 26.83 kg/m².     SpO2: 97 %  O2 Device (Oxygen Therapy): room air      Intake/Output Summary (Last 24 hours) at 11/15/2019 1150  Last data filed at 11/15/2019 0400  Gross per 24 hour   Intake 240 ml   Output 1850 ml   Net -1610 ml       Lines/Drains/Airways     Peripheral Intravenous Line                 Peripheral IV - Single Lumen 11/13/19 0656 20 G Right Forearm 2 days                Physical Exam   Constitutional: He is oriented to person, place, and time. He appears well-developed and  well-nourished. No distress.   HENT:   Head: Normocephalic and atraumatic.   Eyes: Pupils are equal, round, and reactive to light.   Neck: Normal range of motion and full passive range of motion without pain. Neck supple. No JVD present.   Cardiovascular: Normal rate, S1 normal, S2 normal and intact distal pulses. An irregularly irregular rhythm present. PMI is not displaced. Exam reveals no distant heart sounds.   No murmur heard.  Pulses:       Radial pulses are 2+ on the right side, and 2+ on the left side.        Dorsalis pedis pulses are 1+ on the right side, and 1+ on the left side.   Remains in AFIB, variable rate control   Pulmonary/Chest: Effort normal. No accessory muscle usage. No respiratory distress. He has decreased breath sounds in the right lower field and the left lower field. He has no wheezes. He has no rales.   Coarse lung sounds bilaterally     Abdominal: Soft. Bowel sounds are normal. He exhibits no distension. There is no tenderness.   Musculoskeletal: Normal range of motion. He exhibits no edema.        Right ankle: He exhibits no swelling.        Left ankle: He exhibits no swelling.   Neurological: He is alert and oriented to person, place, and time.   Skin: Skin is warm and dry. He is not diaphoretic. No cyanosis. Nails show no clubbing.   Psychiatric: He has a normal mood and affect. His speech is normal and behavior is normal. Judgment and thought content normal. Cognition and memory are normal.   Nursing note and vitals reviewed.      Significant Labs:   BMP:   Recent Labs   Lab 11/14/19  0514 11/15/19  0525    90    139   K 3.3* 3.4*    101   CO2 25 26   BUN 17 18   CREATININE 1.1 1.1   CALCIUM 8.8 8.6*   MG 1.8 1.8   , CBC   Recent Labs   Lab 11/14/19 0514 11/15/19  0525   WBC 4.20 4.33   HGB 11.5* 11.8*   HCT 37.4* 38.6*   * 128*    and All pertinent lab results from the last 24 hours have been reviewed.    Significant Imaging: Echocardiogram:   2D echo with  color flow doppler:   Results for orders placed or performed during the hospital encounter of 11/13/19   2D echo with color flow doppler   Result Value Ref Range    QEF 15 (A) 55 - 65    Mitral Valve Regurgitation MODERATE (A)     Est. PA Systolic Pressure 38.91     Tricuspid Valve Regurgitation MILD     Narrative    Date of Procedure: 11/13/2019        TEST DESCRIPTION   Technical Quality: This is a portable study performed at the patient's bedside. This is a technically challenging study. There is poor endocardial definition.     Aorta: The aortic root is normal in size, measuring 3.3 cm at sinotubular junction and 3.6 cm at Sinuses of Valsalva. The proximal ascending aorta is normal in size, measuring 3.1 cm across.     Left Atrium: The left atrial volume index is severely enlarged, measuring 74.41 cc/m2.     Left Ventricle: The left ventricle is normal in size, with an end-diastolic diameter of 5.7 cm, and an end-systolic diameter of 5.3 cm. LV wall thickness is normal, with the septum measuring 1.1 cm and the posterior wall measuring 1.5 cm across. Relative   wall thickness was increased at 0.53, and the LV mass index was increased at 204.5 g/m2 consistent with concentric left ventricular hypertrophy. There are no regional wall motion abnormalities. Left ventricular systolic function appears severely   depressed. Visually estimated ejection fraction is 15-20%. The LV Doppler derived stroke volume equals 38.0 ccs.     Diastolic indices:     Right Atrium: The right atrium is severely enlarged, measuring 7.0 cm in length and 4.0 cm in width in the apical view.     Right Ventricle: The right ventricle is moderately to severely enlarged in size. Global right ventricular systolic function appears moderately depressed. There is abnormal septal motion consistent with RV pressure/volume overload. Tricuspid annular plane   systolic excursion (TAPSE) is 1.7 cm. The estimated PA systolic pressure is 39 mmHg.     Aortic  Valve:  Aortic valve is normal in structure with normal leaflet mobility. The peak gradient obtained across the aortic valve is 5 mmHg, with a mean gradient of 4 mmHg.     Mitral Valve:  Mitral valve is normal in structure with normal leaflet mobility. The pressure half time is 32 msec. The calculated mitral valve area is 6.88 cm2. There is moderate mitral regurgitation.     Tricuspid Valve:  Tricuspid valve is normal in structure with normal leaflet mobility. There is mild tricuspid regurgitation.     Pulmonary Valve:  Pulmonary valve is normal in structure with normal leaflet mobility. There is trivial to mild pulmonic regurgitation.     IVC: IVC is enlarged but collapses > 50% with a sniff, suggesting intermediate right atrial pressure of 8 mmHg.     Atrial Septum: The atrial septum is intact.     Intracavitary: There is no evidence of pericardial effusion, intracavity mass, thrombi, or vegetation.         CONCLUSIONS     1 - Biatrial enlargement.     2 - Concentric hypertrophy.     3 - No wall motion abnormalities.     4 - Severely depressed left ventricular systolic function (EF 15-20%).     5 - Right ventricular enlargement with moderately depressed systolic function.     6 - The estimated PA systolic pressure is 39 mmHg.     7 - Moderate mitral regurgitation.     8 - Mild tricuspid regurgitation.     9 - Trivial to mild pulmonic regurgitation.     10 - Intermediate central venous pressure.             This document has been electronically    SIGNED BY: Ksenia Cullen MD On: 11/13/2019 12:29     Assessment and Plan:       * Acute on chronic systolic congestive heart failure  Previous EF 20-25%, RVE with mildly depressed RV systolic fn  Repeat ECHO pending  Needs aggressive IV diuresis  Continue Coreg, IV lasix BID, ACEi  Dash diet, 2 gm sodium restriction  1200 ml fluid restriction  Daily weights  Reinforced importance of compliance with medications and dietary restrictions  Discussed importance of alcohol  cessation  Reassess in AM    11/15  -Continue Coreg, Digoxin, Lasix, ACEi, Imdur, Amio  -OP Cardiology follow up for possible ICD implant  -Avoid ETOH in future  -OK to discharge home from cardiology standpoint    ETOH abuse  Encourage cessation    Essential hypertension  -on medical therapy  Continue BB, ACEi, Imdur    Atrial fibrillation with RVR  -Continue Amiodarone, Coumadin, BB, Digoxin  -No CNS complaints to suggest TIA or CVA today  -No signs of abnormal bleeding  -Check Amio level  -Pharmacy to dose Coumadin        VTE Risk Mitigation (From admission, onward)         Ordered     warfarin (COUMADIN) tablet 1 mg  Every Sat, Sun      11/13/19 1600     warfarin (COUMADIN) tablet 2 mg  Every Tues, Thurs      11/13/19 1600     warfarin (COUMADIN) tablet 1 mg  Every Mon, Wed, Fri      11/13/19 1006     IP VTE HIGH RISK PATIENT  Once      11/13/19 1006                Yanira Johnson, KIARRA  Cardiology  [facility]

## 2019-11-15 NOTE — TELEPHONE ENCOUNTER
----- Message from KIARRA Ramírez sent at 11/15/2019  2:42 PM CST -----  Can you help me with this?    Thanks  ----- Message -----  From: Manpreet Gaviria  Sent: 11/15/2019   2:38 PM CST  To: Latisha Kimball LPN, KIARRA Ramírez    Next avail lfor Dr Lou is 02-12-20  ----- Message -----  From: Latisha Kimball LPN  Sent: 11/15/2019   2:25 PM CST  To: Manpreet Gutierrez        ----- Message -----  From: KIARRA Ramírez  Sent: 11/15/2019   2:21 PM CST  To: Elizabeth Doyle Staff    Patient needs appt with Dr. Lou to discuss ICD implant and for CHF follow up with me in next 2 weeks    Please arrange  Thanks

## 2019-11-15 NOTE — NURSING
Discharged order received and reviewed with patient and family. Patient instructed when to take each medication next dose. Prescriptions received. IV removed, tele removed. Patient assisted with dressing by staff. Patient transported to front lobby via wheelchair by staff for family to transport home.

## 2019-11-15 NOTE — PROGRESS NOTES
Clinical Pharmacy: Coumadin Progress Note    Indication: Atrial fibrillation  Goal INR: 2.0-3.0  Today's INR: 2.2 (down from 2.9, therapeutic)    Will continue current dose of Warfarin 2 mg every Tuesday and Thursday, and Warfarin 1 mg on all other days.   PT/INR will be monitored daily. Dose adjustments will be made accordingly.  Patient has been counseled by a pharmacist this admission.     Thank you for allowing us to participate in this patient's care.   Lolita Allen, PharmD 11/14/2019 8:56 PM

## 2019-11-19 DIAGNOSIS — I48.21 ATRIAL FIBRILLATION, PERMANENT: ICD-10-CM

## 2019-11-19 RX ORDER — DIGOXIN 250 MCG
250 TABLET ORAL DAILY
Qty: 30 TABLET | Refills: 2 | Status: SHIPPED | OUTPATIENT
Start: 2019-11-19 | End: 2020-01-01 | Stop reason: SDUPTHER

## 2019-11-19 RX ORDER — FUROSEMIDE 40 MG/1
40 TABLET ORAL 2 TIMES DAILY
Qty: 60 TABLET | Refills: 11 | Status: SHIPPED | OUTPATIENT
Start: 2019-11-19 | End: 2020-01-01 | Stop reason: SDUPTHER

## 2019-11-19 NOTE — TELEPHONE ENCOUNTER
----- Message from Angelique Murphy sent at 11/19/2019  9:04 AM CST -----  Type:  Pharmacy Calling to Clarify an RX    Name of Caller:Evens   Pharmacy Name:[facility]   Prescription Name: furosemide (LASIX) 40 MG tablet and digoxin (LANOXIN) 250 mcg tablet  What do they need to clarify?:pt was release from hospital without them 2 med   Best Call Back Number: [phone]  Additional Information:

## 2019-11-23 NOTE — DISCHARGE SUMMARY
[facility] Hospital Medicine  Discharge Summary      Patient Name: Ute Mcbride  MRN: [id number]  Admission Date: 11/13/2019  Hospital Length of Stay: 0 days  Discharge Date and Time: 11/15/2019  2:11 PM  Attending Physician:  Josué Peters MD  Discharging Provider: Josué Peters MD  Primary Care Provider: Kenneth Redding MD      HPI:   Ute Mcbride is a 64-year-old male with atrial fibrillation, systolic heart failure, CAD and HTN that presented to the ED with a chief complaint of abdominal swelling. He stated that it started on the morning of 11/12/19 and worsened that night. Associated symptoms included shortness of breath that was worse with activity and palpitations. He denied leg swelling, orthopnea, fever, cough, chest pain, abdominal pain, nausea, vomiting, constipation and diarrhea. Of note, the patient stated that he ran out of his digoxin about 2 weeks prior to presentation. In the ED, labs were remarkable for a BNP of 850. Chest X-rayed showed cardiomegaly. EKG showed atrial fibrillation with RVR.    * No surgery found *      Hospital Course:   Placed in observation for evaluation and treatment of abdominal swelling and shortness of breath due to heart failure exacerbation.  IV diuresis with Furosemide.  Sodium and fluid restriction.  Very good response.  Shortness of breath significantly improved.  Remained hemodynamically stable throughout.  Evaluation by Cardiology who assisted with management.  Discharge plan to return home.  Optimize medical management.  Continued follow up outpatient with Cardiology.     Consults:   Consults (From admission, onward)        Status Ordering Provider     Inpatient consult to Cardiology  Once     Provider:  (Not yet assigned)    Completed BARB VINES     Inpatient consult to Registered Dietitian/Nutritionist  Once     Provider:  (Not yet assigned)    Completed BARB VINES     Nutrition Services Referral  Once      Provider:  (Not yet assigned)    Completed BETY DE LOS SANTOS          No new Assessment & Plan notes have been filed under this hospital service since the last note was generated.  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    Specialty:  Cardiology  Why:  Hospital follow up heart failure management and evaluation for AICD  Contact information:  [address] [place] [address] DR Tadeo SALCEDO [id number] [phone]                 Patient Instructions:      Diet Cardiac     Activity as tolerated       Significant Diagnostic Studies: Labs: All labs within the past 24 hours have been reviewed    Pending Diagnostic Studies:     None         Medications:  Reconciled Home Medications:      Medication List      CHANGE how you take these medications    allopurinol 300 MG tablet  Commonly known as:  ZYLOPRIM  Take 300 mg by mouth once daily.  What changed:  Another medication with the same name was removed. Continue taking this medication, and follow the directions you see here.     warfarin 2 MG tablet  Commonly known as:  COUMADIN  Take 1 tablet on Tuesdays and 1/2 tablet on all other day by mouth every evening as directed by coumadin clinic.  What changed:  additional instructions        CONTINUE taking these medications    amiodarone 200 MG Tab  Commonly known as:  PACERONE  Take 1 tablet (200 mg total) by mouth once daily.     carvedilol 6.25 MG tablet  Commonly known as:  COREG  Take 1 tablet (6.25 mg total) by mouth 2  (two) times daily with meals.     isosorbide mononitrate 60 MG 24 hr tablet  Commonly known as:  IMDUR  Take 1 tablet (60 mg total) by mouth once daily.     lisinopril 40 MG tablet  Commonly known as:  PRINIVIL,ZESTRIL  Take 1 tablet (40 mg total) by mouth once daily.     mupirocin 2 % ointment  Commonly known as:  BACTROBAN  Apply topically 3 (three) times daily.     pantoprazole 40 MG tablet  Commonly known as:  PROTONIX  Take 1 tablet (40 mg total) by mouth once daily.            Indwelling Lines/Drains at time of discharge:   Lines/Drains/Airways     None                 Time spent on the discharge of patient: 30 minutes  Patient was seen and examined on the date of discharge and determined to be suitable for discharge.         Josué Peters MD  Department of Hospital Medicine  [facility]

## 2019-12-02 ENCOUNTER — PATIENT OUTREACH (OUTPATIENT)
Dept: ADMINISTRATIVE | Facility: HOSPITAL | Age: 64
End: 2019-12-02

## 2020-01-01 ENCOUNTER — ANTI-COAG VISIT (OUTPATIENT)
Dept: CARDIOLOGY | Facility: CLINIC | Age: 65
End: 2020-01-01
Payer: MEDICARE

## 2020-01-01 ENCOUNTER — HOSPITAL ENCOUNTER (EMERGENCY)
Facility: HOSPITAL | Age: 65
Discharge: HOME | End: 2020-08-06
Attending: EMERGENCY MEDICINE
Payer: MEDICARE

## 2020-01-01 ENCOUNTER — TELEPHONE (OUTPATIENT)
Dept: CARDIOLOGY | Facility: CLINIC | Age: 65
End: 2020-01-01

## 2020-01-01 VITALS
DIASTOLIC BLOOD PRESSURE: 88 MMHG | BODY MASS INDEX: 29.32 KG/M2 | WEIGHT: 181.69 LBS | OXYGEN SATURATION: 95 % | TEMPERATURE: 98 F | RESPIRATION RATE: 18 BRPM | HEART RATE: 99 BPM | SYSTOLIC BLOOD PRESSURE: 130 MMHG

## 2020-01-01 DIAGNOSIS — I48.91 ATRIAL FIBRILLATION WITH RVR: ICD-10-CM

## 2020-01-01 DIAGNOSIS — Z79.01 LONG TERM (CURRENT) USE OF ANTICOAGULANTS: Primary | ICD-10-CM

## 2020-01-01 DIAGNOSIS — I11.0 HYPERTENSIVE CHF (CONGESTIVE HEART FAILURE): Chronic | ICD-10-CM

## 2020-01-01 DIAGNOSIS — I11.0 HYPERTENSIVE HEART DISEASE WITH CHRONIC COMBINED SYSTOLIC AND DIASTOLIC CONGESTIVE HEART FAILURE: Chronic | ICD-10-CM

## 2020-01-01 DIAGNOSIS — R07.9 CHEST PAIN: ICD-10-CM

## 2020-01-01 DIAGNOSIS — I48.11 LONGSTANDING PERSISTENT ATRIAL FIBRILLATION: ICD-10-CM

## 2020-01-01 DIAGNOSIS — I50.9 ACUTE ON CHRONIC CONGESTIVE HEART FAILURE, UNSPECIFIED HEART FAILURE TYPE: Primary | ICD-10-CM

## 2020-01-01 DIAGNOSIS — I48.21 ATRIAL FIBRILLATION, PERMANENT: ICD-10-CM

## 2020-01-01 DIAGNOSIS — I50.42 HYPERTENSIVE HEART DISEASE WITH CHRONIC COMBINED SYSTOLIC AND DIASTOLIC CONGESTIVE HEART FAILURE: Chronic | ICD-10-CM

## 2020-01-01 DIAGNOSIS — I42.8 CARDIOMYOPATHY, NONISCHEMIC: ICD-10-CM

## 2020-01-01 DIAGNOSIS — I50.23 ACUTE ON CHRONIC SYSTOLIC HF (HEART FAILURE): ICD-10-CM

## 2020-01-01 DIAGNOSIS — Z79.01 LONG TERM (CURRENT) USE OF ANTICOAGULANTS: ICD-10-CM

## 2020-01-01 LAB
ALBUMIN SERPL BCP-MCNC: 4 G/DL (ref 3.5–5.2)
ALP SERPL-CCNC: 100 U/L (ref 55–135)
ALT SERPL W/O P-5'-P-CCNC: 36 U/L (ref 10–44)
ANION GAP SERPL CALC-SCNC: 16 MMOL/L (ref 8–16)
AST SERPL-CCNC: 36 U/L (ref 10–40)
BASOPHILS # BLD AUTO: 0.06 K/UL (ref 0–0.2)
BASOPHILS NFR BLD: 0.9 % (ref 0–1.9)
BILIRUB SERPL-MCNC: 1.2 MG/DL (ref 0.1–1)
BNP SERPL-MCNC: 1162 PG/ML (ref 0–99)
BUN SERPL-MCNC: 12 MG/DL (ref 8–23)
CALCIUM SERPL-MCNC: 9.5 MG/DL (ref 8.7–10.5)
CHLORIDE SERPL-SCNC: 96 MMOL/L (ref 95–110)
CO2 SERPL-SCNC: 21 MMOL/L (ref 23–29)
CREAT SERPL-MCNC: 0.9 MG/DL (ref 0.5–1.4)
DIFFERENTIAL METHOD: ABNORMAL
DIGOXIN SERPL-MCNC: <0.1 NG/ML (ref 0.8–2)
EOSINOPHIL # BLD AUTO: 0.2 K/UL (ref 0–0.5)
EOSINOPHIL NFR BLD: 2.4 % (ref 0–8)
ERYTHROCYTE [DISTWIDTH] IN BLOOD BY AUTOMATED COUNT: 16.7 % (ref 11.5–14.5)
EST. GFR  (AFRICAN AMERICAN): >60 ML/MIN/1.73 M^2
EST. GFR  (NON AFRICAN AMERICAN): >60 ML/MIN/1.73 M^2
GLUCOSE SERPL-MCNC: 97 MG/DL (ref 70–110)
HCT VFR BLD AUTO: 44.3 % (ref 40–54)
HGB BLD-MCNC: 13.7 G/DL (ref 14–18)
IMM GRANULOCYTES # BLD AUTO: 0.03 K/UL (ref 0–0.04)
IMM GRANULOCYTES NFR BLD AUTO: 0.4 % (ref 0–0.5)
INR PPP: 1.3 (ref 0.8–1.2)
INR PPP: 2.2 (ref 2–3)
INR PPP: 2.3 (ref 2–3)
INR PPP: 2.5 (ref 2–3)
INR PPP: 2.7 (ref 2–3)
LYMPHOCYTES # BLD AUTO: 1.9 K/UL (ref 1–4.8)
LYMPHOCYTES NFR BLD: 26.9 % (ref 18–48)
MCH RBC QN AUTO: 27.3 PG (ref 27–31)
MCHC RBC AUTO-ENTMCNC: 30.9 G/DL (ref 32–36)
MCV RBC AUTO: 88 FL (ref 82–98)
MONOCYTES # BLD AUTO: 0.8 K/UL (ref 0.3–1)
MONOCYTES NFR BLD: 11.7 % (ref 4–15)
NEUTROPHILS # BLD AUTO: 4 K/UL (ref 1.8–7.7)
NEUTROPHILS NFR BLD: 57.7 % (ref 38–73)
NRBC BLD-RTO: 0 /100 WBC
PLATELET # BLD AUTO: 151 K/UL (ref 150–350)
PMV BLD AUTO: 9.3 FL (ref 9.2–12.9)
POTASSIUM SERPL-SCNC: 4.2 MMOL/L (ref 3.5–5.1)
PROT SERPL-MCNC: 8.4 G/DL (ref 6–8.4)
PROTHROMBIN TIME: 14.1 SEC (ref 9–12.5)
RBC # BLD AUTO: 5.02 M/UL (ref 4.6–6.2)
SARS-COV-2 RDRP RESP QL NAA+PROBE: NEGATIVE
SODIUM SERPL-SCNC: 133 MMOL/L (ref 136–145)
TROPONIN I SERPL DL<=0.01 NG/ML-MCNC: 0.01 NG/ML (ref 0–0.03)
WBC # BLD AUTO: 6.98 K/UL (ref 3.9–12.7)

## 2020-01-01 PROCEDURE — 85610 PROTHROMBIN TIME: CPT | Mod: QW,S$GLB,, | Performed by: INTERNAL MEDICINE

## 2020-01-01 PROCEDURE — 93005 ELECTROCARDIOGRAM TRACING: CPT

## 2020-01-01 PROCEDURE — 85610 POCT INR: ICD-10-PCS | Mod: QW,S$GLB,, | Performed by: INTERNAL MEDICINE

## 2020-01-01 PROCEDURE — 93793 PR ANTICOAGULANT MGMT FOR PT TAKING WARFARIN: ICD-10-PCS | Mod: S$GLB,,,

## 2020-01-01 PROCEDURE — 93793 ANTICOAG MGMT PT WARFARIN: CPT | Mod: S$GLB,,,

## 2020-01-01 PROCEDURE — 96374 THER/PROPH/DIAG INJ IV PUSH: CPT

## 2020-01-01 PROCEDURE — 93010 ELECTROCARDIOGRAM REPORT: CPT | Mod: ,,, | Performed by: INTERNAL MEDICINE

## 2020-01-01 PROCEDURE — 93010 ELECTROCARDIOGRAM REPORT: CPT | Mod: 76,,, | Performed by: INTERNAL MEDICINE

## 2020-01-01 PROCEDURE — 80162 ASSAY OF DIGOXIN TOTAL: CPT

## 2020-01-01 PROCEDURE — 83880 ASSAY OF NATRIURETIC PEPTIDE: CPT

## 2020-01-01 PROCEDURE — U0002 COVID-19 LAB TEST NON-CDC: HCPCS

## 2020-01-01 PROCEDURE — 85610 PROTHROMBIN TIME: CPT

## 2020-01-01 PROCEDURE — 80053 COMPREHEN METABOLIC PANEL: CPT

## 2020-01-01 PROCEDURE — 84484 ASSAY OF TROPONIN QUANT: CPT

## 2020-01-01 PROCEDURE — 99285 EMERGENCY DEPT VISIT HI MDM: CPT | Mod: 25

## 2020-01-01 PROCEDURE — 85025 COMPLETE CBC W/AUTO DIFF WBC: CPT

## 2020-01-01 PROCEDURE — 93010 EKG 12-LEAD: ICD-10-PCS | Mod: ,,, | Performed by: INTERNAL MEDICINE

## 2020-01-01 PROCEDURE — 25000003 PHARM REV CODE 250: Performed by: PHYSICIAN ASSISTANT

## 2020-01-01 PROCEDURE — 63600175 PHARM REV CODE 636 W HCPCS: Performed by: EMERGENCY MEDICINE

## 2020-01-01 RX ORDER — AMIODARONE HYDROCHLORIDE 200 MG/1
200 TABLET ORAL DAILY
Qty: 120 TABLET | Refills: 3 | Status: ON HOLD | OUTPATIENT
Start: 2020-01-01 | End: 2021-01-01 | Stop reason: HOSPADM

## 2020-01-01 RX ORDER — ASPIRIN 325 MG
325 TABLET ORAL
Status: COMPLETED | OUTPATIENT
Start: 2020-01-01 | End: 2020-01-01

## 2020-01-01 RX ORDER — LISINOPRIL 40 MG/1
40 TABLET ORAL DAILY
Qty: 30 TABLET | Refills: 3 | Status: ON HOLD | OUTPATIENT
Start: 2020-01-01 | End: 2021-01-01 | Stop reason: HOSPADM

## 2020-01-01 RX ORDER — FUROSEMIDE 40 MG/1
40 TABLET ORAL 2 TIMES DAILY
Qty: 60 TABLET | Refills: 11 | Status: ON HOLD | OUTPATIENT
Start: 2020-01-01 | End: 2021-01-01 | Stop reason: HOSPADM

## 2020-01-01 RX ORDER — CARVEDILOL 6.25 MG/1
6.25 TABLET ORAL 2 TIMES DAILY WITH MEALS
Qty: 60 TABLET | Refills: 1 | Status: ON HOLD | OUTPATIENT
Start: 2020-01-01 | End: 2021-01-01 | Stop reason: HOSPADM

## 2020-01-01 RX ORDER — DIGOXIN 250 MCG
250 TABLET ORAL DAILY
Qty: 30 TABLET | Refills: 2 | Status: ON HOLD | OUTPATIENT
Start: 2020-01-01 | End: 2021-01-01 | Stop reason: HOSPADM

## 2020-01-01 RX ORDER — FUROSEMIDE 10 MG/ML
40 INJECTION INTRAMUSCULAR; INTRAVENOUS
Status: COMPLETED | OUTPATIENT
Start: 2020-01-01 | End: 2020-01-01

## 2020-01-01 RX ORDER — ISOSORBIDE MONONITRATE 60 MG/1
60 TABLET, EXTENDED RELEASE ORAL DAILY
Qty: 30 TABLET | Refills: 11 | Status: ON HOLD | OUTPATIENT
Start: 2020-01-01 | End: 2021-01-01 | Stop reason: HOSPADM

## 2020-01-01 RX ORDER — WARFARIN 2 MG/1
TABLET ORAL
Qty: 30 TABLET | Refills: 3 | Status: ON HOLD | OUTPATIENT
Start: 2020-01-01 | End: 2021-01-01 | Stop reason: HOSPADM

## 2020-01-01 RX ADMIN — FUROSEMIDE 40 MG: 10 INJECTION, SOLUTION INTRAMUSCULAR; INTRAVENOUS at 12:08

## 2020-01-01 RX ADMIN — ASPIRIN 325 MG ORAL TABLET 325 MG: 325 PILL ORAL at 12:08

## 2020-01-14 DIAGNOSIS — I11.0 HYPERTENSIVE CHF (CONGESTIVE HEART FAILURE): Chronic | ICD-10-CM

## 2020-01-14 DIAGNOSIS — I50.23 ACUTE ON CHRONIC SYSTOLIC HF (HEART FAILURE): ICD-10-CM

## 2020-01-14 RX ORDER — LISINOPRIL 40 MG/1
40 TABLET ORAL DAILY
Qty: 30 TABLET | Refills: 3 | Status: SHIPPED | OUTPATIENT
Start: 2020-01-14 | End: 2020-01-01 | Stop reason: SDUPTHER

## 2020-08-06 NOTE — PROGRESS NOTES
INR not at goal. Medications, chart, and patient findings reviewed. See calendar for adjustments to dose and follow up plan.  Patient has not been seen in coumadin clinic since 9/2019.  Will try to re-establish care.  INR yesterday in ER is subtherapeutic.  Pt reported being out of medications for 5 days.  Likely needs warfarin refill.  EKG showing AFib.  Attempted to reach pt - no VM.

## 2020-08-06 NOTE — PROGRESS NOTES
Pt report that he has run out of medication.  I will send a new Rx to his preferred pharmacy.  I have reviewed the dose that he should resume.  He states that he will  his medication tomorrow and take 2 tablets as directed for a boosted dose, then resume his previous dose.  He would like to go to [facility].  We will set up his appointment on 8/18 and then notify him of the time to report.

## 2020-08-06 NOTE — ED PROVIDER NOTES
SCRIBE #1 NOTE: I, Daisha Farah, am scribing for, and in the presence of, Jennifer Hampton MD. I have scribed the entire note.       History     Chief Complaint   Patient presents with    Abdominal Pain     reports hx of CHF; reports swelling and pain to abd. onset yesterday, worsening today     Review of patient's allergies indicates:  No Known Allergies      History of Present Illness     HPI    8/5/2020, 11:45 PM  History obtained from the patient      History of Present Illness: Ute Mcbride is a 65 y.o. male patient with a PMHx of CHF who presents to the Emergency Department for evaluation of unexpected weight change which onset gradually 1 day ago. Pt states he gained 10 pounds overnight in his abdomen. Symptoms are constant and moderate in severity. No mitigating or exacerbating factors reported. Associated sxs include CP and SOB on exertion. Patient denies any fever, chills, sick contact, leg pain/swelling, numbness/weakness, diaphoresis, palpitations, dizziness, cough, n/v , and all other sxs at this time. No prior Tx included. Pt states he sleeps on 3 pillows at home, his last normal bowel movement was this morning, and positive flatus. Pt states he has been out of his medications for the last 5 days. No further complaints or concerns at this time.       Arrival mode: Personal vehicle     PCP: Kenneth Redding MD        Past Medical History:  Past Medical History:   Diagnosis Date    Anticoagulant long-term use     but has been noncompliant    Arthritis     Atrial fibrillation, chronic     Cardiomyopathy, nonischemic 11/9/2014    Coronary artery disease     Encounter for blood transfusion     Gout     Hypertension     Systolic heart failure        Past Surgical History:  Past Surgical History:   Procedure Laterality Date    ABLATION N/A 8/15/2018    Procedure: ABLATION;  Surgeon: Mario Lou MD;  Location: [facility] CATH LAB;  Service: Cardiology;  Laterality: N/A;  AF, PEGGY, PVI, RFA, RUDDY,  , MB, 3 Prep    ANKLE SURGERY      CARDIAC CATHETERIZATION      CATARACT EXTRACTION W/  INTRAOCULAR LENS IMPLANT Bilateral          Family History:  Family History   Problem Relation Age of Onset    Stroke Mother     Hypertension Mother     Hypertension Father        Social History:  Social History     Tobacco Use    Smoking status: Former Smoker     Quit date: 1994     Years since quittin.5    Smokeless tobacco: Never Used   Substance and Sexual Activity    Alcohol use: Yes     Alcohol/week: 3.0 standard drinks     Types: 3 Cans of beer per week     Frequency: 2-3 times a week     Drinks per session: 3 or 4    Drug use: No    Sexual activity: Unknown        Review of Systems     Review of Systems   Constitutional: Positive for unexpected weight change (gained 10 lbs overnight). Negative for chills, diaphoresis and fever.   HENT: Negative for sore throat.    Respiratory: Positive for shortness of breath (on exertion). Negative for cough.    Cardiovascular: Positive for chest pain. Negative for palpitations and leg swelling.   Gastrointestinal: Negative for nausea and vomiting.   Genitourinary: Negative for dysuria.   Musculoskeletal: Negative for back pain.   Skin: Negative for rash.   Neurological: Negative for dizziness, weakness and numbness.   Hematological: Does not bruise/bleed easily.   All other systems reviewed and are negative.       Physical Exam     Initial Vitals [20]   BP Pulse Resp Temp SpO2   (!) 141/96 (!) 115 20 97.7 °F (36.5 °C) 100 %      MAP       --          Physical Exam  Nursing Notes and Vital Signs Reviewed.  Constitutional: Patient is in no acute distress. Well-developed and well-nourished.  Head: Atraumatic. Normocephalic.  Eyes: PERRL. EOM intact. Conjunctivae are not pale. No scleral icterus.  ENT: Mucous membranes are moist. Oropharynx is clear and symmetric.    Neck: Supple. Full ROM. No lymphadenopathy.  Cardiovascular: Regular rate. Regular rhythm.  No murmurs, rubs, or gallops. Distal pulses are 2+ and symmetric.  Pulmonary/Chest: No respiratory distress. Clear to auscultation bilaterally. No wheezing or rales.  Abdominal: Soft and non-distended.  There is no tenderness.  No rebound, guarding, or rigidity. Good bowel sounds.  Genitourinary: No CVA tenderness  Musculoskeletal: Moves all extremities. No obvious deformities. No calf tenderness. There is 1+ edema BLE noted.  Skin: Warm and dry.  Neurological:  Alert, awake, and appropriate.  Normal speech.  No acute focal neurological deficits are appreciated.  Psychiatric: Normal affect. Good eye contact. Appropriate in content.     ED Course   Procedures  ED Vital Signs:  Vitals:    08/05/20 2033 08/05/20 2348 08/06/20 0000 08/06/20 0001   BP: (!) 141/96  (!) 138/94    Pulse: (!) 115 102 103 105   Resp: 20  18 18   Temp: 97.7 °F (36.5 °C)      TempSrc: Oral      SpO2: 100%  98% 96%   Weight: 82.4 kg (181 lb 10.5 oz)       08/06/20 0100 08/06/20 0200   BP: 134/86 130/88   Pulse: 96 99   Resp: 18 18   Temp:     TempSrc:     SpO2: 95% 95%   Weight:         Abnormal Lab Results:  Labs Reviewed   CBC W/ AUTO DIFFERENTIAL - Abnormal; Notable for the following components:       Result Value    Hemoglobin 13.7 (*)     Mean Corpuscular Hemoglobin Conc 30.9 (*)     RDW 16.7 (*)     All other components within normal limits   COMPREHENSIVE METABOLIC PANEL - Abnormal; Notable for the following components:    Sodium 133 (*)     CO2 21 (*)     Total Bilirubin 1.2 (*)     All other components within normal limits   B-TYPE NATRIURETIC PEPTIDE - Abnormal; Notable for the following components:    BNP 1,162 (*)     All other components within normal limits   DIGOXIN LEVEL - Abnormal; Notable for the following components:    Digoxin Lvl <0.1 (*)     All other components within normal limits   PROTIME-INR - Abnormal; Notable for the following components:    Prothrombin Time 14.1 (*)     INR 1.3 (*)     All other components within  normal limits   TROPONIN I   SARS-COV-2 RNA AMPLIFICATION, QUAL        All Lab Results:  Results for orders placed or performed during the hospital encounter of 08/05/20   CBC auto differential   Result Value Ref Range    WBC 6.98 3.90 - 12.70 K/uL    RBC 5.02 4.60 - 6.20 M/uL    Hemoglobin 13.7 (L) 14.0 - 18.0 g/dL    Hematocrit 44.3 40.0 - 54.0 %    Mean Corpuscular Volume 88 82 - 98 fL    Mean Corpuscular Hemoglobin 27.3 27.0 - 31.0 pg    Mean Corpuscular Hemoglobin Conc 30.9 (L) 32.0 - 36.0 g/dL    RDW 16.7 (H) 11.5 - 14.5 %    Platelets 151 150 - 350 K/uL    MPV 9.3 9.2 - 12.9 fL    Immature Granulocytes 0.4 0.0 - 0.5 %    Gran # (ANC) 4.0 1.8 - 7.7 K/uL    Immature Grans (Abs) 0.03 0.00 - 0.04 K/uL    Lymph # 1.9 1.0 - 4.8 K/uL    Mono # 0.8 0.3 - 1.0 K/uL    Eos # 0.2 0.0 - 0.5 K/uL    Baso # 0.06 0.00 - 0.20 K/uL    nRBC 0 0 /100 WBC    Gran% 57.7 38.0 - 73.0 %    Lymph% 26.9 18.0 - 48.0 %    Mono% 11.7 4.0 - 15.0 %    Eosinophil% 2.4 0.0 - 8.0 %    Basophil% 0.9 0.0 - 1.9 %    Differential Method Automated    Comprehensive metabolic panel   Result Value Ref Range    Sodium 133 (L) 136 - 145 mmol/L    Potassium 4.2 3.5 - 5.1 mmol/L    Chloride 96 95 - 110 mmol/L    CO2 21 (L) 23 - 29 mmol/L    Glucose 97 70 - 110 mg/dL    BUN, Bld 12 8 - 23 mg/dL    Creatinine 0.9 0.5 - 1.4 mg/dL    Calcium 9.5 8.7 - 10.5 mg/dL    Total Protein 8.4 6.0 - 8.4 g/dL    Albumin 4.0 3.5 - 5.2 g/dL    Total Bilirubin 1.2 (H) 0.1 - 1.0 mg/dL    Alkaline Phosphatase 100 55 - 135 U/L    AST 36 10 - 40 U/L    ALT 36 10 - 44 U/L    Anion Gap 16 8 - 16 mmol/L    eGFR if African American >60 >60 mL/min/1.73 m^2    eGFR if non African American >60 >60 mL/min/1.73 m^2   Troponin I #1   Result Value Ref Range    Troponin I 0.007 0.000 - 0.026 ng/mL   B-Type natriuretic peptide (BNP)   Result Value Ref Range    BNP 1,162 (H) 0 - 99 pg/mL   COVID-19 Rapid Screening   Result Value Ref Range    SARS-CoV-2 RNA, Amplification, Qual Negative  Negative   Digoxin level   Result Value Ref Range    Digoxin Lvl <0.1 (L) 0.8 - 2.0 ng/mL   Protime-INR   Result Value Ref Range    Prothrombin Time 14.1 (H) 9.0 - 12.5 sec    INR 1.3 (H) 0.8 - 1.2         Imaging Results:  Imaging Results          X-Ray Chest PA And Lateral (Final result)  Result time 08/05/20 22:49:49    Final result by Ru Mojica MD (08/05/20 22:49:49)                 Impression:      1. No acute chest findings.  2. No significant change since 11/13/2019.      Electronically signed by: Ru Mojica  Date:    08/05/2020  Time:    22:49             Narrative:    EXAMINATION:  XR CHEST PA AND LATERAL    CLINICAL HISTORY:  Chest Pain;    COMPARISON:  11/13/2019    FINDINGS:  Minimal atelectasis left upper lobe.  Otherwise lungs are clear.  Cardiac silhouette is enlarged.  Mild vascular calcification of the aorta.Degenerative changes of the spine present..                                 The EKG was ordered, reviewed, and independently interpreted by the ED provider.  Interpretation time: 2918  Rate: 98 BPM  Rhythm: atrial fibrillation  Interpretation: Right bundle branch block. No STEMI.             The Emergency Provider reviewed the vital signs and test results, which are outlined above.     ED Discussion     3:07 AM: Reassessed pt at this time. Discussed with pt all pertinent ED information and results. Discussed pt dx and plan of tx. Gave pt all f/u and return to the ED instructions. All questions and concerns were addressed at this time. Pt expresses understanding of information and instructions, and is comfortable with plan to discharge. Pt is stable for discharge.    I discussed with patient and/or family/caretaker that evaluation in the ED does not suggest any emergent or life threatening medical conditions requiring immediate intervention beyond what was provided in the ED, and I believe patient is safe for discharge.  Regardless, an unremarkable evaluation in the ED does  not preclude the development or presence of a serious of life threatening condition. As such, patient was instructed to return immediately for any worsening or change in current symptoms.           Medical Decision Making:   Clinical Tests:   Lab Tests: Ordered and Reviewed  Radiological Study: Ordered and Reviewed  Medical Tests: Ordered and Reviewed           ED Medication(s):  Medications   aspirin tablet 325 mg (325 mg Oral Given 8/6/20 0013)   furosemide injection 40 mg (40 mg Intravenous Given 8/6/20 0013)       Discharge Medication List as of 8/6/2020  2:28 AM          Follow-up Information     Kenneth Redding MD In 1 day.    Specialty: Family Medicine  Contact information:  [address]  [facility] [address]  [phone]             [facility]    Specialty: Emergency Medicine  Why: As needed, If symptoms worsen  Contact information:  [address]  [facility] [address] [phone]                     Scribe Attestation:   Scribe #1: I performed the above scribed service and the documentation accurately describes the services I performed. I attest to the accuracy of the note.     Attending:   Physician Attestation Statement for Scribe #1: I, Jennifer Hampton MD, personally performed the services described in this documentation, as scribed by Daisha Farah, in my presence, and it is both accurate and complete.           Clinical Impression       ICD-10-CM ICD-9-CM   1. Acute on chronic congestive heart failure, unspecified heart failure type  I50.9 428.0   2. Chest pain  R07.9 786.50   3. Atrial fibrillation, permanent  I48.21 427.31   4. Cardiomyopathy, nonischemic  I42.8 425.4   5. Hypertensive heart disease with chronic combined systolic and diastolic congestive heart failure  I11.0 402.91    I50.42 428.42   6. Hypertensive CHF (congestive heart failure)  I11.0 402.91     428.0   7. Acute on chronic systolic HF (heart failure)  I50.23 428.23   8. Longstanding  persistent atrial fibrillation  I48.11 427.31       Disposition:   Disposition: Discharged  Condition: Stable         Jennifer Hampton MD  08/06/20 0534

## 2020-10-06 NOTE — PROGRESS NOTES
Re-enrollment:  Patient's INR is therapeutic at 2.2.  Reports taking 2 mg every Tuesday, Thursday; and 1 mg on all other days per week.  No recent changes reported.  Instructions given to maintain current dose of warfarin.  Advised on the importance for frequent and regular monitoring and compliance is stressed.  Maintain a consistent diet.  Recheck in 2 weeks.  Dose calendar given.  Patient verbalized understanding of all medical information given.

## 2020-10-22 NOTE — PROGRESS NOTES
Patient's INR is therapeutic at 2.7.  Reports no recent changes.  Current warfarin regimen verified.  No change in dose - instructed to maintain scheduled dose of 2 mg every Tuesday, Thursday; and 1 mg on all other days per week.  Dose calendar given.  Patient verbalized understanding.

## 2020-12-04 NOTE — PROGRESS NOTES
Patient's INR is therapeutic at 2.3.  Reports no recent changes.  Current warfarin regimen verified and followed.  No change in dose - patient to maintain scheduled dose of 2 mg every Tuesday, Thursday; and 1 mg on all other days.  Recheck in 4 weeks.  Dose calendar given.  Patient verbalized understanding.

## 2020-12-30 NOTE — PROGRESS NOTES
Patient's INR is therapeutic at 2.5.  Reports no recent changes.  Current warfarin dose verified.  No change in dose - patient to maintain 2 mg every Tuesday, Thursday; and 1 mg on all other days.  Recheck on 1/27/2021.  Dose calendar given.  Patient verbalized understanding.

## 2021-01-01 ENCOUNTER — HOSPITAL ENCOUNTER (INPATIENT)
Facility: HOSPITAL | Age: 66
LOS: 7 days | DRG: 208 | End: 2021-02-13
Attending: EMERGENCY MEDICINE | Admitting: FAMILY MEDICINE
Payer: MEDICARE

## 2021-01-01 VITALS
HEIGHT: 67 IN | BODY MASS INDEX: 28.13 KG/M2 | DIASTOLIC BLOOD PRESSURE: 58 MMHG | TEMPERATURE: 99 F | SYSTOLIC BLOOD PRESSURE: 106 MMHG | WEIGHT: 179.25 LBS | OXYGEN SATURATION: 100 %

## 2021-01-01 DIAGNOSIS — R73.9 HYPERGLYCEMIA: ICD-10-CM

## 2021-01-01 DIAGNOSIS — N17.9 AKI (ACUTE KIDNEY INJURY): ICD-10-CM

## 2021-01-01 DIAGNOSIS — D68.9 COAGULOPATHY: ICD-10-CM

## 2021-01-01 DIAGNOSIS — E87.20 LACTIC ACIDOSIS: Primary | ICD-10-CM

## 2021-01-01 DIAGNOSIS — K72.00 ACUTE LIVER FAILURE WITHOUT HEPATIC COMA: ICD-10-CM

## 2021-01-01 DIAGNOSIS — I46.9 CARDIAC ARREST: ICD-10-CM

## 2021-01-01 DIAGNOSIS — I10 ESSENTIAL HYPERTENSION: Chronic | ICD-10-CM

## 2021-01-01 DIAGNOSIS — I46.9 CARDIOPULMONARY ARREST WITH SUCCESSFUL RESUSCITATION: ICD-10-CM

## 2021-01-01 DIAGNOSIS — J96.01 ACUTE HYPOXEMIC RESPIRATORY FAILURE: ICD-10-CM

## 2021-01-01 DIAGNOSIS — E87.29 HIGH ANION GAP METABOLIC ACIDOSIS: ICD-10-CM

## 2021-01-01 DIAGNOSIS — I50.9 CHF (CONGESTIVE HEART FAILURE): ICD-10-CM

## 2021-01-01 DIAGNOSIS — R06.02 SOB (SHORTNESS OF BREATH): ICD-10-CM

## 2021-01-01 DIAGNOSIS — R79.1 ELEVATED INR: ICD-10-CM

## 2021-01-01 DIAGNOSIS — I48.91 ATRIAL FIBRILLATION WITH RVR: Chronic | ICD-10-CM

## 2021-01-01 DIAGNOSIS — I50.43 ACUTE ON CHRONIC COMBINED SYSTOLIC AND DIASTOLIC HEART FAILURE: ICD-10-CM

## 2021-01-01 DIAGNOSIS — F10.10 ETOH ABUSE: Chronic | ICD-10-CM

## 2021-01-01 DIAGNOSIS — D64.9 ANEMIA, UNSPECIFIED TYPE: ICD-10-CM

## 2021-01-01 LAB
ABO + RH BLD: NORMAL
ALBUMIN SERPL BCP-MCNC: 2.9 G/DL (ref 3.5–5.2)
ALBUMIN SERPL BCP-MCNC: 3 G/DL (ref 3.5–5.2)
ALBUMIN SERPL BCP-MCNC: 3.1 G/DL (ref 3.5–5.2)
ALBUMIN SERPL BCP-MCNC: 3.1 G/DL (ref 3.5–5.2)
ALBUMIN SERPL BCP-MCNC: 3.2 G/DL (ref 3.5–5.2)
ALBUMIN SERPL BCP-MCNC: 3.2 G/DL (ref 3.5–5.2)
ALBUMIN SERPL BCP-MCNC: 3.4 G/DL (ref 3.5–5.2)
ALBUMIN SERPL BCP-MCNC: 3.5 G/DL (ref 3.5–5.2)
ALBUMIN SERPL BCP-MCNC: 3.5 G/DL (ref 3.5–5.2)
ALBUMIN SERPL BCP-MCNC: 4 G/DL (ref 3.5–5.2)
ALLENS TEST: ABNORMAL
ALP SERPL-CCNC: 101 U/L (ref 55–135)
ALP SERPL-CCNC: 104 U/L (ref 55–135)
ALP SERPL-CCNC: 105 U/L (ref 55–135)
ALP SERPL-CCNC: 81 U/L (ref 55–135)
ALP SERPL-CCNC: 83 U/L (ref 55–135)
ALP SERPL-CCNC: 85 U/L (ref 55–135)
ALP SERPL-CCNC: 86 U/L (ref 55–135)
ALP SERPL-CCNC: 88 U/L (ref 55–135)
ALP SERPL-CCNC: 94 U/L (ref 55–135)
ALP SERPL-CCNC: 97 U/L (ref 55–135)
ALT SERPL W/O P-5'-P-CCNC: 332 U/L (ref 10–44)
ALT SERPL W/O P-5'-P-CCNC: 362 U/L (ref 10–44)
ALT SERPL W/O P-5'-P-CCNC: 408 U/L (ref 10–44)
ALT SERPL W/O P-5'-P-CCNC: 456 U/L (ref 10–44)
ALT SERPL W/O P-5'-P-CCNC: 590 U/L (ref 10–44)
ALT SERPL W/O P-5'-P-CCNC: 700 U/L (ref 10–44)
ALT SERPL W/O P-5'-P-CCNC: 737 U/L (ref 10–44)
ALT SERPL W/O P-5'-P-CCNC: 793 U/L (ref 10–44)
ALT SERPL W/O P-5'-P-CCNC: 843 U/L (ref 10–44)
ALT SERPL W/O P-5'-P-CCNC: 949 U/L (ref 10–44)
AMMONIA PLAS-SCNC: 115 UMOL/L (ref 10–50)
AMMONIA PLAS-SCNC: 61 UMOL/L (ref 10–50)
ANION GAP SERPL CALC-SCNC: 11 MMOL/L (ref 8–16)
ANION GAP SERPL CALC-SCNC: 12 MMOL/L (ref 8–16)
ANION GAP SERPL CALC-SCNC: 12 MMOL/L (ref 8–16)
ANION GAP SERPL CALC-SCNC: 13 MMOL/L (ref 8–16)
ANION GAP SERPL CALC-SCNC: 15 MMOL/L (ref 8–16)
ANION GAP SERPL CALC-SCNC: 16 MMOL/L (ref 8–16)
ANION GAP SERPL CALC-SCNC: 17 MMOL/L (ref 8–16)
ANION GAP SERPL CALC-SCNC: 19 MMOL/L (ref 8–16)
ANION GAP SERPL CALC-SCNC: 20 MMOL/L (ref 8–16)
ANION GAP SERPL CALC-SCNC: 24 MMOL/L (ref 8–16)
ANION GAP SERPL CALC-SCNC: 29 MMOL/L (ref 8–16)
ANION GAP SERPL CALC-SCNC: 33 MMOL/L (ref 8–16)
ANION GAP SERPL CALC-SCNC: 38 MMOL/L (ref 8–16)
ANION GAP SERPL CALC-SCNC: 7 MMOL/L (ref 8–16)
ANION GAP SERPL CALC-SCNC: 9 MMOL/L (ref 8–16)
AORTIC ROOT ANNULUS: 3.64 CM
APAP SERPL-MCNC: <3 UG/ML (ref 10–20)
APTT BLDCRRT: 38.4 SEC (ref 21–32)
ASCENDING AORTA: 3.24 CM
AST SERPL-CCNC: 146 U/L (ref 10–40)
AST SERPL-CCNC: 164 U/L (ref 10–40)
AST SERPL-CCNC: 194 U/L (ref 10–40)
AST SERPL-CCNC: 2262 U/L (ref 10–40)
AST SERPL-CCNC: 2522 U/L (ref 10–40)
AST SERPL-CCNC: 389 U/L (ref 10–40)
AST SERPL-CCNC: 405 U/L (ref 10–40)
AST SERPL-CCNC: 477 U/L (ref 10–40)
AST SERPL-CCNC: 557 U/L (ref 10–40)
AST SERPL-CCNC: 934 U/L (ref 10–40)
AV INDEX (PROSTH): 0.61
AV MEAN GRADIENT: 3 MMHG
AV PEAK GRADIENT: 5 MMHG
AV VALVE AREA: 1.96 CM2
AV VELOCITY RATIO: 0.63
BACTERIA BLD CULT: NORMAL
BACTERIA BLD CULT: NORMAL
BASOPHILS # BLD AUTO: 0 K/UL (ref 0–0.2)
BASOPHILS # BLD AUTO: 0 K/UL (ref 0–0.2)
BASOPHILS # BLD AUTO: 0.01 K/UL (ref 0–0.2)
BASOPHILS # BLD AUTO: 0.02 K/UL (ref 0–0.2)
BASOPHILS NFR BLD: 0 % (ref 0–1.9)
BASOPHILS NFR BLD: 0 % (ref 0–1.9)
BASOPHILS NFR BLD: 0.1 % (ref 0–1.9)
BASOPHILS NFR BLD: 0.2 % (ref 0–1.9)
BASOPHILS NFR BLD: 0.3 % (ref 0–1.9)
BILIRUB DIRECT SERPL-MCNC: 1.5 MG/DL (ref 0.1–0.3)
BILIRUB DIRECT SERPL-MCNC: 2 MG/DL (ref 0.1–0.3)
BILIRUB SERPL-MCNC: 1.9 MG/DL (ref 0.1–1)
BILIRUB SERPL-MCNC: 1.9 MG/DL (ref 0.1–1)
BILIRUB SERPL-MCNC: 2.2 MG/DL (ref 0.1–1)
BILIRUB SERPL-MCNC: 2.5 MG/DL (ref 0.1–1)
BILIRUB SERPL-MCNC: 2.5 MG/DL (ref 0.1–1)
BILIRUB SERPL-MCNC: 2.9 MG/DL (ref 0.1–1)
BILIRUB SERPL-MCNC: 3.1 MG/DL (ref 0.1–1)
BILIRUB SERPL-MCNC: 3.4 MG/DL (ref 0.1–1)
BILIRUB SERPL-MCNC: 4.3 MG/DL (ref 0.1–1)
BILIRUB SERPL-MCNC: 4.6 MG/DL (ref 0.1–1)
BILIRUB UR QL STRIP: ABNORMAL
BILIRUB UR QL STRIP: NEGATIVE
BLD GP AB SCN CELLS X3 SERPL QL: NORMAL
BLD PROD TYP BPU: NORMAL
BLOOD UNIT EXPIRATION DATE: NORMAL
BLOOD UNIT TYPE CODE: 600
BLOOD UNIT TYPE CODE: 6200
BLOOD UNIT TYPE: NORMAL
BNP SERPL-MCNC: 1309 PG/ML (ref 0–99)
BSA FOR ECHO PROCEDURE: 1.98 M2
BUN SERPL-MCNC: 24 MG/DL (ref 8–23)
BUN SERPL-MCNC: 26 MG/DL (ref 8–23)
BUN SERPL-MCNC: 27 MG/DL (ref 8–23)
BUN SERPL-MCNC: 27 MG/DL (ref 8–23)
BUN SERPL-MCNC: 28 MG/DL (ref 8–23)
BUN SERPL-MCNC: 28 MG/DL (ref 8–23)
BUN SERPL-MCNC: 29 MG/DL (ref 8–23)
BUN SERPL-MCNC: 30 MG/DL (ref 8–23)
BUN SERPL-MCNC: 34 MG/DL (ref 8–23)
BUN SERPL-MCNC: 34 MG/DL (ref 8–23)
BUN SERPL-MCNC: 35 MG/DL (ref 8–23)
BUN SERPL-MCNC: 35 MG/DL (ref 8–23)
BUN SERPL-MCNC: 36 MG/DL (ref 8–23)
BUN SERPL-MCNC: 37 MG/DL (ref 8–23)
BUN SERPL-MCNC: 39 MG/DL (ref 8–23)
BUN SERPL-MCNC: 39 MG/DL (ref 8–23)
BUN SERPL-MCNC: 40 MG/DL (ref 8–23)
BUN SERPL-MCNC: 41 MG/DL (ref 8–23)
BUN SERPL-MCNC: 46 MG/DL (ref 8–23)
CALCIUM SERPL-MCNC: 7.9 MG/DL (ref 8.7–10.5)
CALCIUM SERPL-MCNC: 8.2 MG/DL (ref 8.7–10.5)
CALCIUM SERPL-MCNC: 8.2 MG/DL (ref 8.7–10.5)
CALCIUM SERPL-MCNC: 8.3 MG/DL (ref 8.7–10.5)
CALCIUM SERPL-MCNC: 8.5 MG/DL (ref 8.7–10.5)
CALCIUM SERPL-MCNC: 8.6 MG/DL (ref 8.7–10.5)
CALCIUM SERPL-MCNC: 8.6 MG/DL (ref 8.7–10.5)
CALCIUM SERPL-MCNC: 8.7 MG/DL (ref 8.7–10.5)
CALCIUM SERPL-MCNC: 8.7 MG/DL (ref 8.7–10.5)
CALCIUM SERPL-MCNC: 8.8 MG/DL (ref 8.7–10.5)
CALCIUM SERPL-MCNC: 8.8 MG/DL (ref 8.7–10.5)
CALCIUM SERPL-MCNC: 9.1 MG/DL (ref 8.7–10.5)
CALCIUM SERPL-MCNC: 9.3 MG/DL (ref 8.7–10.5)
CALCIUM SERPL-MCNC: 9.5 MG/DL (ref 8.7–10.5)
CHLORIDE SERPL-SCNC: 101 MMOL/L (ref 95–110)
CHLORIDE SERPL-SCNC: 102 MMOL/L (ref 95–110)
CHLORIDE SERPL-SCNC: 103 MMOL/L (ref 95–110)
CHLORIDE SERPL-SCNC: 104 MMOL/L (ref 95–110)
CHLORIDE SERPL-SCNC: 104 MMOL/L (ref 95–110)
CHLORIDE SERPL-SCNC: 95 MMOL/L (ref 95–110)
CHLORIDE SERPL-SCNC: 96 MMOL/L (ref 95–110)
CHLORIDE SERPL-SCNC: 97 MMOL/L (ref 95–110)
CHLORIDE SERPL-SCNC: 99 MMOL/L (ref 95–110)
CK SERPL-CCNC: 98 U/L (ref 20–200)
CLARITY UR: CLEAR
CLARITY UR: CLEAR
CO2 SERPL-SCNC: 10 MMOL/L (ref 23–29)
CO2 SERPL-SCNC: 15 MMOL/L (ref 23–29)
CO2 SERPL-SCNC: 18 MMOL/L (ref 23–29)
CO2 SERPL-SCNC: 22 MMOL/L (ref 23–29)
CO2 SERPL-SCNC: 25 MMOL/L (ref 23–29)
CO2 SERPL-SCNC: 25 MMOL/L (ref 23–29)
CO2 SERPL-SCNC: 26 MMOL/L (ref 23–29)
CO2 SERPL-SCNC: 28 MMOL/L (ref 23–29)
CO2 SERPL-SCNC: 29 MMOL/L (ref 23–29)
CO2 SERPL-SCNC: 29 MMOL/L (ref 23–29)
CO2 SERPL-SCNC: 30 MMOL/L (ref 23–29)
CO2 SERPL-SCNC: 31 MMOL/L (ref 23–29)
CO2 SERPL-SCNC: 32 MMOL/L (ref 23–29)
CO2 SERPL-SCNC: 7 MMOL/L (ref 23–29)
CODING SYSTEM: NORMAL
COLOR UR: YELLOW
COLOR UR: YELLOW
CREAT SERPL-MCNC: 1 MG/DL (ref 0.5–1.4)
CREAT SERPL-MCNC: 1 MG/DL (ref 0.5–1.4)
CREAT SERPL-MCNC: 1.1 MG/DL (ref 0.5–1.4)
CREAT SERPL-MCNC: 1.2 MG/DL (ref 0.5–1.4)
CREAT SERPL-MCNC: 1.3 MG/DL (ref 0.5–1.4)
CREAT SERPL-MCNC: 1.3 MG/DL (ref 0.5–1.4)
CREAT SERPL-MCNC: 1.4 MG/DL (ref 0.5–1.4)
CREAT SERPL-MCNC: 1.6 MG/DL (ref 0.5–1.4)
CREAT SERPL-MCNC: 1.7 MG/DL (ref 0.5–1.4)
CREAT SERPL-MCNC: 1.9 MG/DL (ref 0.5–1.4)
CREAT UR-MCNC: 76 MG/DL (ref 23–375)
CV ECHO LV RWT: 0.38 CM
D DIMER PPP IA.FEU-MCNC: 4.14 MG/L FEU
DELSYS: ABNORMAL
DIFFERENTIAL METHOD: ABNORMAL
DIGOXIN SERPL-MCNC: <0.1 NG/ML (ref 0.8–2)
DISPENSE STATUS: NORMAL
DOP CALC AO PEAK VEL: 1.09 M/S
DOP CALC AO VTI: 15.6 CM
DOP CALC LVOT AREA: 3.2 CM2
DOP CALC LVOT DIAMETER: 2.03 CM
DOP CALC LVOT PEAK VEL: 0.69 M/S
DOP CALC LVOT STROKE VOLUME: 30.63 CM3
DOP CALC RVOT PEAK VEL: 0.55 M/S
DOP CALC RVOT VTI: 8.42 CM
DOP CALCLVOT PEAK VEL VTI: 9.47 CM
E WAVE DECELERATION TIME: 181.88 MSEC
E/A RATIO: 0.84
E/E' RATIO: 14.77 M/S
ECHO LV POSTERIOR WALL: 1.1 CM (ref 0.6–1.1)
EOSINOPHIL # BLD AUTO: 0 K/UL (ref 0–0.5)
EOSINOPHIL NFR BLD: 0 % (ref 0–8)
ERYTHROCYTE [DISTWIDTH] IN BLOOD BY AUTOMATED COUNT: 17.7 % (ref 11.5–14.5)
ERYTHROCYTE [DISTWIDTH] IN BLOOD BY AUTOMATED COUNT: 17.8 % (ref 11.5–14.5)
ERYTHROCYTE [DISTWIDTH] IN BLOOD BY AUTOMATED COUNT: 17.8 % (ref 11.5–14.5)
ERYTHROCYTE [DISTWIDTH] IN BLOOD BY AUTOMATED COUNT: 18.2 % (ref 11.5–14.5)
ERYTHROCYTE [DISTWIDTH] IN BLOOD BY AUTOMATED COUNT: 18.2 % (ref 11.5–14.5)
ERYTHROCYTE [DISTWIDTH] IN BLOOD BY AUTOMATED COUNT: 18.3 % (ref 11.5–14.5)
ERYTHROCYTE [DISTWIDTH] IN BLOOD BY AUTOMATED COUNT: 18.4 % (ref 11.5–14.5)
ERYTHROCYTE [DISTWIDTH] IN BLOOD BY AUTOMATED COUNT: 18.6 % (ref 11.5–14.5)
ERYTHROCYTE [DISTWIDTH] IN BLOOD BY AUTOMATED COUNT: 18.6 % (ref 11.5–14.5)
ERYTHROCYTE [DISTWIDTH] IN BLOOD BY AUTOMATED COUNT: 19.5 % (ref 11.5–14.5)
ERYTHROCYTE [SEDIMENTATION RATE] IN BLOOD BY WESTERGREN METHOD: 16 MM/H
ERYTHROCYTE [SEDIMENTATION RATE] IN BLOOD BY WESTERGREN METHOD: 16 MM/H
ERYTHROCYTE [SEDIMENTATION RATE] IN BLOOD BY WESTERGREN METHOD: 18 MM/H
ERYTHROCYTE [SEDIMENTATION RATE] IN BLOOD BY WESTERGREN METHOD: 20 MM/H
ERYTHROCYTE [SEDIMENTATION RATE] IN BLOOD BY WESTERGREN METHOD: 20 MM/H
ERYTHROCYTE [SEDIMENTATION RATE] IN BLOOD BY WESTERGREN METHOD: 22 MM/H
ERYTHROCYTE [SEDIMENTATION RATE] IN BLOOD BY WESTERGREN METHOD: 22 MM/H
ERYTHROCYTE [SEDIMENTATION RATE] IN BLOOD BY WESTERGREN METHOD: 24 MM/H
ERYTHROCYTE [SEDIMENTATION RATE] IN BLOOD BY WESTERGREN METHOD: 29 MM/H
ERYTHROCYTE [SEDIMENTATION RATE] IN BLOOD BY WESTERGREN METHOD: 29 MM/H
EST. GFR  (AFRICAN AMERICAN): 42 ML/MIN/1.73 M^2
EST. GFR  (AFRICAN AMERICAN): 48 ML/MIN/1.73 M^2
EST. GFR  (AFRICAN AMERICAN): 51 ML/MIN/1.73 M^2
EST. GFR  (AFRICAN AMERICAN): >60 ML/MIN/1.73 M^2
EST. GFR  (NON AFRICAN AMERICAN): 36 ML/MIN/1.73 M^2
EST. GFR  (NON AFRICAN AMERICAN): 41 ML/MIN/1.73 M^2
EST. GFR  (NON AFRICAN AMERICAN): 45 ML/MIN/1.73 M^2
EST. GFR  (NON AFRICAN AMERICAN): 52 ML/MIN/1.73 M^2
EST. GFR  (NON AFRICAN AMERICAN): 57 ML/MIN/1.73 M^2
EST. GFR  (NON AFRICAN AMERICAN): 57 ML/MIN/1.73 M^2
EST. GFR  (NON AFRICAN AMERICAN): >60 ML/MIN/1.73 M^2
ETHANOL SERPL-MCNC: <10 MG/DL
FERRITIN SERPL-MCNC: 96 NG/ML (ref 20–300)
FIO2: 100
FIO2: 32
FIO2: 35
FIO2: 40
FIO2: 95
FLOW: 3
FOLATE SERPL-MCNC: 12.9 NG/ML (ref 4–24)
FRACTIONAL SHORTENING: 11 % (ref 28–44)
GLUCOSE SERPL-MCNC: 115 MG/DL (ref 70–110)
GLUCOSE SERPL-MCNC: 122 MG/DL (ref 70–110)
GLUCOSE SERPL-MCNC: 123 MG/DL (ref 70–110)
GLUCOSE SERPL-MCNC: 129 MG/DL (ref 70–110)
GLUCOSE SERPL-MCNC: 142 MG/DL (ref 70–110)
GLUCOSE SERPL-MCNC: 145 MG/DL (ref 70–110)
GLUCOSE SERPL-MCNC: 155 MG/DL (ref 70–110)
GLUCOSE SERPL-MCNC: 163 MG/DL (ref 70–110)
GLUCOSE SERPL-MCNC: 175 MG/DL (ref 70–110)
GLUCOSE SERPL-MCNC: 182 MG/DL (ref 70–110)
GLUCOSE SERPL-MCNC: 192 MG/DL (ref 70–110)
GLUCOSE SERPL-MCNC: 196 MG/DL (ref 70–110)
GLUCOSE SERPL-MCNC: 201 MG/DL (ref 70–110)
GLUCOSE SERPL-MCNC: 208 MG/DL (ref 70–110)
GLUCOSE SERPL-MCNC: 217 MG/DL (ref 70–110)
GLUCOSE SERPL-MCNC: 219 MG/DL (ref 70–110)
GLUCOSE SERPL-MCNC: 220 MG/DL (ref 70–110)
GLUCOSE SERPL-MCNC: 287 MG/DL (ref 70–110)
GLUCOSE SERPL-MCNC: 91 MG/DL (ref 70–110)
GLUCOSE SERPL-MCNC: 94 MG/DL (ref 70–110)
GLUCOSE UR QL STRIP: ABNORMAL
GLUCOSE UR QL STRIP: NEGATIVE
HAV IGM SERPL QL IA: NEGATIVE
HBV CORE IGM SERPL QL IA: NEGATIVE
HBV SURFACE AG SERPL QL IA: NEGATIVE
HCO3 UR-SCNC: 13.4 MMOL/L (ref 24–28)
HCO3 UR-SCNC: 31.5 MMOL/L (ref 24–28)
HCO3 UR-SCNC: 31.6 MMOL/L (ref 24–28)
HCO3 UR-SCNC: 31.9 MMOL/L (ref 24–28)
HCO3 UR-SCNC: 32.3 MMOL/L (ref 24–28)
HCO3 UR-SCNC: 32.6 MMOL/L (ref 24–28)
HCO3 UR-SCNC: 33.2 MMOL/L (ref 24–28)
HCO3 UR-SCNC: 33.7 MMOL/L (ref 24–28)
HCO3 UR-SCNC: 34 MMOL/L (ref 24–28)
HCO3 UR-SCNC: 42.6 MMOL/L (ref 24–28)
HCO3 UR-SCNC: 6.9 MMOL/L (ref 24–28)
HCO3 UR-SCNC: 7.3 MMOL/L (ref 24–28)
HCO3 UR-SCNC: 7.4 MMOL/L (ref 24–28)
HCT VFR BLD AUTO: 26.6 % (ref 40–54)
HCT VFR BLD AUTO: 27.5 % (ref 40–54)
HCT VFR BLD AUTO: 28.6 % (ref 40–54)
HCT VFR BLD AUTO: 29.3 % (ref 40–54)
HCT VFR BLD AUTO: 33.1 % (ref 40–54)
HCT VFR BLD AUTO: 33.4 % (ref 40–54)
HCT VFR BLD AUTO: 33.9 % (ref 40–54)
HCT VFR BLD AUTO: 33.9 % (ref 40–54)
HCT VFR BLD AUTO: 34.4 % (ref 40–54)
HCT VFR BLD AUTO: 36.4 % (ref 40–54)
HCT VFR BLD AUTO: 38.8 % (ref 40–54)
HCT VFR BLD CALC: 35 %PCV (ref 36–54)
HCV AB SERPL QL IA: NEGATIVE
HGB BLD-MCNC: 10.3 G/DL (ref 14–18)
HGB BLD-MCNC: 7.7 G/DL (ref 14–18)
HGB BLD-MCNC: 8.1 G/DL (ref 14–18)
HGB BLD-MCNC: 8.4 G/DL (ref 14–18)
HGB BLD-MCNC: 8.5 G/DL (ref 14–18)
HGB BLD-MCNC: 9 G/DL (ref 14–18)
HGB BLD-MCNC: 9.1 G/DL (ref 14–18)
HGB BLD-MCNC: 9.2 G/DL (ref 14–18)
HGB BLD-MCNC: 9.4 G/DL (ref 14–18)
HGB BLD-MCNC: 9.5 G/DL (ref 14–18)
HGB BLD-MCNC: 9.6 G/DL (ref 14–18)
HGB UR QL STRIP: ABNORMAL
HGB UR QL STRIP: NEGATIVE
IMM GRANULOCYTES # BLD AUTO: 0.01 K/UL (ref 0–0.04)
IMM GRANULOCYTES # BLD AUTO: 0.02 K/UL (ref 0–0.04)
IMM GRANULOCYTES # BLD AUTO: 0.03 K/UL (ref 0–0.04)
IMM GRANULOCYTES # BLD AUTO: 0.04 K/UL (ref 0–0.04)
IMM GRANULOCYTES # BLD AUTO: 0.05 K/UL (ref 0–0.04)
IMM GRANULOCYTES # BLD AUTO: 0.13 K/UL (ref 0–0.04)
IMM GRANULOCYTES # BLD AUTO: 0.14 K/UL (ref 0–0.04)
IMM GRANULOCYTES # BLD AUTO: 0.15 K/UL (ref 0–0.04)
IMM GRANULOCYTES # BLD AUTO: 0.17 K/UL (ref 0–0.04)
IMM GRANULOCYTES # BLD AUTO: 0.25 K/UL (ref 0–0.04)
IMM GRANULOCYTES NFR BLD AUTO: 0.2 % (ref 0–0.5)
IMM GRANULOCYTES NFR BLD AUTO: 0.5 % (ref 0–0.5)
IMM GRANULOCYTES NFR BLD AUTO: 0.6 % (ref 0–0.5)
IMM GRANULOCYTES NFR BLD AUTO: 0.8 % (ref 0–0.5)
IMM GRANULOCYTES NFR BLD AUTO: 1 % (ref 0–0.5)
IMM GRANULOCYTES NFR BLD AUTO: 1.4 % (ref 0–0.5)
IMM GRANULOCYTES NFR BLD AUTO: 1.5 % (ref 0–0.5)
IMM GRANULOCYTES NFR BLD AUTO: 1.7 % (ref 0–0.5)
IMM GRANULOCYTES NFR BLD AUTO: 1.8 % (ref 0–0.5)
IMM GRANULOCYTES NFR BLD AUTO: 2.6 % (ref 0–0.5)
INR PPP: 1.9 (ref 0.8–1.2)
INR PPP: 2.1 (ref 0.8–1.2)
INR PPP: 2.6 (ref 0.8–1.2)
INR PPP: 2.9 (ref 0.8–1.2)
INR PPP: 2.9 (ref 0.8–1.2)
INR PPP: 3.2 (ref 0.8–1.2)
INR PPP: 4.6 (ref 0.8–1.2)
INR PPP: >10 (ref 0.8–1.2)
INTERVENTRICULAR SEPTUM: 1.22 CM (ref 0.6–1.1)
IP: 15
IRON SERPL-MCNC: 11 UG/DL (ref 45–160)
IT: 0.7
IT: 0.71
IT: 0.71
IVRT: 51.38 MSEC
KETONES UR QL STRIP: NEGATIVE
KETONES UR QL STRIP: NEGATIVE
LA MAJOR: 7.29 CM
LA MINOR: 4.35 CM
LACTATE SERPL-SCNC: 1.7 MMOL/L (ref 0.5–2.2)
LACTATE SERPL-SCNC: 1.8 MMOL/L (ref 0.5–2.2)
LACTATE SERPL-SCNC: 1.9 MMOL/L (ref 0.5–2.2)
LACTATE SERPL-SCNC: 2.6 MMOL/L (ref 0.5–2.2)
LACTATE SERPL-SCNC: 2.9 MMOL/L (ref 0.5–2.2)
LACTATE SERPL-SCNC: 4 MMOL/L (ref 0.5–2.2)
LACTATE SERPL-SCNC: 8.7 MMOL/L (ref 0.5–2.2)
LACTATE SERPL-SCNC: 8.9 MMOL/L (ref 0.5–2.2)
LACTATE SERPL-SCNC: >12 MMOL/L (ref 0.5–2.2)
LDH SERPL L TO P-CCNC: 792 U/L (ref 110–260)
LEFT ATRIUM SIZE: 5.28 CM
LEFT INTERNAL DIMENSION IN SYSTOLE: 5.09 CM (ref 2.1–4)
LEFT VENTRICLE DIASTOLIC VOLUME INDEX: 83.3 ML/M2
LEFT VENTRICLE DIASTOLIC VOLUME: 161.61 ML
LEFT VENTRICLE MASS INDEX: 143 G/M2
LEFT VENTRICLE SYSTOLIC VOLUME INDEX: 63.4 ML/M2
LEFT VENTRICLE SYSTOLIC VOLUME: 123.07 ML
LEFT VENTRICULAR INTERNAL DIMENSION IN DIASTOLE: 5.72 CM (ref 3.5–6)
LEFT VENTRICULAR MASS: 277.3 G
LEUKOCYTE ESTERASE UR QL STRIP: NEGATIVE
LEUKOCYTE ESTERASE UR QL STRIP: NEGATIVE
LV LATERAL E/E' RATIO: 10.67 M/S
LV SEPTAL E/E' RATIO: 24 M/S
LYMPHOCYTES # BLD AUTO: 0.3 K/UL (ref 1–4.8)
LYMPHOCYTES # BLD AUTO: 0.4 K/UL (ref 1–4.8)
LYMPHOCYTES # BLD AUTO: 0.5 K/UL (ref 1–4.8)
LYMPHOCYTES # BLD AUTO: 0.5 K/UL (ref 1–4.8)
LYMPHOCYTES # BLD AUTO: 0.6 K/UL (ref 1–4.8)
LYMPHOCYTES # BLD AUTO: 0.8 K/UL (ref 1–4.8)
LYMPHOCYTES # BLD AUTO: 0.8 K/UL (ref 1–4.8)
LYMPHOCYTES # BLD AUTO: 0.9 K/UL (ref 1–4.8)
LYMPHOCYTES # BLD AUTO: 0.9 K/UL (ref 1–4.8)
LYMPHOCYTES # BLD AUTO: 1.1 K/UL (ref 1–4.8)
LYMPHOCYTES NFR BLD: 10.5 % (ref 18–48)
LYMPHOCYTES NFR BLD: 11.1 % (ref 18–48)
LYMPHOCYTES NFR BLD: 12.4 % (ref 18–48)
LYMPHOCYTES NFR BLD: 14 % (ref 18–48)
LYMPHOCYTES NFR BLD: 7 % (ref 18–48)
LYMPHOCYTES NFR BLD: 7.6 % (ref 18–48)
LYMPHOCYTES NFR BLD: 8.1 % (ref 18–48)
LYMPHOCYTES NFR BLD: 8.2 % (ref 18–48)
LYMPHOCYTES NFR BLD: 9.5 % (ref 18–48)
LYMPHOCYTES NFR BLD: 9.9 % (ref 18–48)
MAGNESIUM SERPL-MCNC: 2.2 MG/DL (ref 1.6–2.6)
MAGNESIUM SERPL-MCNC: 2.3 MG/DL (ref 1.6–2.6)
MAGNESIUM SERPL-MCNC: 2.4 MG/DL (ref 1.6–2.6)
MAGNESIUM SERPL-MCNC: 2.5 MG/DL (ref 1.6–2.6)
MAGNESIUM SERPL-MCNC: 2.8 MG/DL (ref 1.6–2.6)
MAGNESIUM SERPL-MCNC: 2.8 MG/DL (ref 1.6–2.6)
MAGNESIUM SERPL-MCNC: 3.1 MG/DL (ref 1.6–2.6)
MCH RBC QN AUTO: 23.5 PG (ref 27–31)
MCH RBC QN AUTO: 23.7 PG (ref 27–31)
MCH RBC QN AUTO: 23.7 PG (ref 27–31)
MCH RBC QN AUTO: 23.8 PG (ref 27–31)
MCH RBC QN AUTO: 24 PG (ref 27–31)
MCH RBC QN AUTO: 24.1 PG (ref 27–31)
MCH RBC QN AUTO: 24.1 PG (ref 27–31)
MCH RBC QN AUTO: 24.2 PG (ref 27–31)
MCHC RBC AUTO-ENTMCNC: 25.3 G/DL (ref 32–36)
MCHC RBC AUTO-ENTMCNC: 26.5 G/DL (ref 32–36)
MCHC RBC AUTO-ENTMCNC: 26.5 G/DL (ref 32–36)
MCHC RBC AUTO-ENTMCNC: 27.7 G/DL (ref 32–36)
MCHC RBC AUTO-ENTMCNC: 27.9 G/DL (ref 32–36)
MCHC RBC AUTO-ENTMCNC: 28.7 G/DL (ref 32–36)
MCHC RBC AUTO-ENTMCNC: 28.9 G/DL (ref 32–36)
MCHC RBC AUTO-ENTMCNC: 29 G/DL (ref 32–36)
MCHC RBC AUTO-ENTMCNC: 29.4 G/DL (ref 32–36)
MCHC RBC AUTO-ENTMCNC: 29.5 G/DL (ref 32–36)
MCV RBC AUTO: 81 FL (ref 82–98)
MCV RBC AUTO: 81 FL (ref 82–98)
MCV RBC AUTO: 82 FL (ref 82–98)
MCV RBC AUTO: 85 FL (ref 82–98)
MCV RBC AUTO: 87 FL (ref 82–98)
MCV RBC AUTO: 91 FL (ref 82–98)
MCV RBC AUTO: 91 FL (ref 82–98)
MCV RBC AUTO: 93 FL (ref 82–98)
MIN VOL: 11.3
MODE: ABNORMAL
MONOCYTES # BLD AUTO: 0.2 K/UL (ref 0.3–1)
MONOCYTES # BLD AUTO: 0.2 K/UL (ref 0.3–1)
MONOCYTES # BLD AUTO: 0.3 K/UL (ref 0.3–1)
MONOCYTES # BLD AUTO: 0.5 K/UL (ref 0.3–1)
MONOCYTES # BLD AUTO: 0.6 K/UL (ref 0.3–1)
MONOCYTES # BLD AUTO: 0.9 K/UL (ref 0.3–1)
MONOCYTES # BLD AUTO: 0.9 K/UL (ref 0.3–1)
MONOCYTES # BLD AUTO: 1.5 K/UL (ref 0.3–1)
MONOCYTES NFR BLD: 12.6 % (ref 4–15)
MONOCYTES NFR BLD: 14.9 % (ref 4–15)
MONOCYTES NFR BLD: 3.9 % (ref 4–15)
MONOCYTES NFR BLD: 4.1 % (ref 4–15)
MONOCYTES NFR BLD: 6.9 % (ref 4–15)
MONOCYTES NFR BLD: 7.4 % (ref 4–15)
MONOCYTES NFR BLD: 8.4 % (ref 4–15)
MONOCYTES NFR BLD: 9 % (ref 4–15)
MONOCYTES NFR BLD: 9.2 % (ref 4–15)
MONOCYTES NFR BLD: 9.3 % (ref 4–15)
MV PEAK A VEL: 1.14 M/S
MV PEAK E VEL: 0.96 M/S
NEUTROPHILS # BLD AUTO: 3 K/UL (ref 1.8–7.7)
NEUTROPHILS # BLD AUTO: 3 K/UL (ref 1.8–7.7)
NEUTROPHILS # BLD AUTO: 4.3 K/UL (ref 1.8–7.7)
NEUTROPHILS # BLD AUTO: 4.7 K/UL (ref 1.8–7.7)
NEUTROPHILS # BLD AUTO: 5.3 K/UL (ref 1.8–7.7)
NEUTROPHILS # BLD AUTO: 6.6 K/UL (ref 1.8–7.7)
NEUTROPHILS # BLD AUTO: 6.8 K/UL (ref 1.8–7.7)
NEUTROPHILS # BLD AUTO: 7.3 K/UL (ref 1.8–7.7)
NEUTROPHILS # BLD AUTO: 7.4 K/UL (ref 1.8–7.7)
NEUTROPHILS # BLD AUTO: 8.3 K/UL (ref 1.8–7.7)
NEUTROPHILS NFR BLD: 73.8 % (ref 38–73)
NEUTROPHILS NFR BLD: 74 % (ref 38–73)
NEUTROPHILS NFR BLD: 76.8 % (ref 38–73)
NEUTROPHILS NFR BLD: 76.9 % (ref 38–73)
NEUTROPHILS NFR BLD: 80.7 % (ref 38–73)
NEUTROPHILS NFR BLD: 81 % (ref 38–73)
NEUTROPHILS NFR BLD: 81.7 % (ref 38–73)
NEUTROPHILS NFR BLD: 81.8 % (ref 38–73)
NEUTROPHILS NFR BLD: 87.2 % (ref 38–73)
NEUTROPHILS NFR BLD: 88.7 % (ref 38–73)
NITRITE UR QL STRIP: NEGATIVE
NITRITE UR QL STRIP: NEGATIVE
NRBC BLD-RTO: 1 /100 WBC
NRBC BLD-RTO: 2 /100 WBC
NRBC BLD-RTO: 2 /100 WBC
NRBC BLD-RTO: 4 /100 WBC
NRBC BLD-RTO: 5 /100 WBC
NRBC BLD-RTO: 6 /100 WBC
NRBC BLD-RTO: 7 /100 WBC
NUM UNITS TRANS FFP: NORMAL
OSMOLALITY SERPL: 316 MOSM/KG (ref 280–300)
OSMOLALITY SERPL: 318 MOSM/KG (ref 280–300)
PCO2 BLDA: 20.5 MMHG (ref 35–45)
PCO2 BLDA: 23.3 MMHG (ref 35–45)
PCO2 BLDA: 28 MMHG (ref 35–45)
PCO2 BLDA: 28.9 MMHG (ref 35–45)
PCO2 BLDA: 28.9 MMHG (ref 35–45)
PCO2 BLDA: 31.3 MMHG (ref 35–45)
PCO2 BLDA: 31.4 MMHG (ref 35–45)
PCO2 BLDA: 32.1 MMHG (ref 35–45)
PCO2 BLDA: 33.9 MMHG (ref 35–45)
PCO2 BLDA: 39.5 MMHG (ref 35–45)
PCO2 BLDA: 40.7 MMHG (ref 35–45)
PCO2 BLDA: 44.2 MMHG (ref 35–45)
PCO2 BLDA: 58.8 MMHG (ref 35–45)
PEEP: 10
PEEP: 5
PEEP: 8
PH SMN: 7 [PH] (ref 7.35–7.45)
PH SMN: 7.01 [PH] (ref 7.35–7.45)
PH SMN: 7.16 [PH] (ref 7.35–7.45)
PH SMN: 7.24 [PH] (ref 7.35–7.45)
PH SMN: 7.35 [PH] (ref 7.35–7.45)
PH SMN: 7.48 [PH] (ref 7.35–7.45)
PH SMN: 7.54 [PH] (ref 7.35–7.45)
PH SMN: 7.59 [PH] (ref 7.35–7.45)
PH SMN: 7.61 [PH] (ref 7.35–7.45)
PH SMN: 7.63 [PH] (ref 7.35–7.45)
PH SMN: 7.63 [PH] (ref 7.35–7.45)
PH SMN: 7.65 [PH] (ref 7.35–7.45)
PH SMN: 7.74 [PH] (ref 7.35–7.45)
PH UR STRIP: 6 [PH] (ref 5–8)
PH UR STRIP: 6 [PH] (ref 5–8)
PHOSPHATE SERPL-MCNC: 2.1 MG/DL (ref 2.7–4.5)
PHOSPHATE SERPL-MCNC: 2.2 MG/DL (ref 2.7–4.5)
PHOSPHATE SERPL-MCNC: 2.3 MG/DL (ref 2.7–4.5)
PHOSPHATE SERPL-MCNC: 2.6 MG/DL (ref 2.7–4.5)
PHOSPHATE SERPL-MCNC: 4.7 MG/DL (ref 2.7–4.5)
PHOSPHATE SERPL-MCNC: 5.7 MG/DL (ref 2.7–4.5)
PISA MRMAX VEL: 0.05 M/S
PISA TR MAX VEL: 3.59 M/S
PLATELET # BLD AUTO: 160 K/UL (ref 150–350)
PLATELET # BLD AUTO: 161 K/UL (ref 150–350)
PLATELET # BLD AUTO: 166 K/UL (ref 150–350)
PLATELET # BLD AUTO: 166 K/UL (ref 150–350)
PLATELET # BLD AUTO: 173 K/UL (ref 150–350)
PLATELET # BLD AUTO: 175 K/UL (ref 150–350)
PLATELET # BLD AUTO: 198 K/UL (ref 150–350)
PLATELET # BLD AUTO: 233 K/UL (ref 150–350)
PLATELET # BLD AUTO: 245 K/UL (ref 150–350)
PLATELET # BLD AUTO: 269 K/UL (ref 150–350)
PMV BLD AUTO: 10.1 FL (ref 9.2–12.9)
PMV BLD AUTO: 10.1 FL (ref 9.2–12.9)
PMV BLD AUTO: 10.9 FL (ref 9.2–12.9)
PMV BLD AUTO: 10.9 FL (ref 9.2–12.9)
PMV BLD AUTO: 9.4 FL (ref 9.2–12.9)
PMV BLD AUTO: 9.8 FL (ref 9.2–12.9)
PMV BLD AUTO: 9.9 FL (ref 9.2–12.9)
PO2 BLDA: 155 MMHG (ref 80–100)
PO2 BLDA: 156 MMHG (ref 80–100)
PO2 BLDA: 159 MMHG (ref 80–100)
PO2 BLDA: 173 MMHG (ref 80–100)
PO2 BLDA: 176 MMHG (ref 80–100)
PO2 BLDA: 187 MMHG (ref 80–100)
PO2 BLDA: 273 MMHG (ref 80–100)
PO2 BLDA: 32 MMHG (ref 80–100)
PO2 BLDA: 421 MMHG (ref 80–100)
PO2 BLDA: 461 MMHG (ref 80–100)
PO2 BLDA: 51 MMHG (ref 80–100)
PO2 BLDA: 557 MMHG (ref 80–100)
PO2 BLDA: 73 MMHG (ref 80–100)
POC BE: -14 MMOL/L
POC BE: -21 MMOL/L
POC BE: -24 MMOL/L
POC BE: -24 MMOL/L
POC BE: 10 MMOL/L
POC BE: 10 MMOL/L
POC BE: 11 MMOL/L
POC BE: 12 MMOL/L
POC BE: 13 MMOL/L
POC BE: 21 MMOL/L
POC BE: 7 MMOL/L
POC IONIZED CALCIUM: 1.07 MMOL/L (ref 1.06–1.42)
POC SATURATED O2: 100 % (ref 95–100)
POC SATURATED O2: 37 % (ref 95–100)
POC SATURATED O2: 68 % (ref 95–100)
POC SATURATED O2: 93 % (ref 95–100)
POCT GLUCOSE: 100 MG/DL (ref 70–110)
POCT GLUCOSE: 124 MG/DL (ref 70–110)
POCT GLUCOSE: 135 MG/DL (ref 70–110)
POCT GLUCOSE: 145 MG/DL (ref 70–110)
POCT GLUCOSE: 145 MG/DL (ref 70–110)
POCT GLUCOSE: 157 MG/DL (ref 70–110)
POCT GLUCOSE: 158 MG/DL (ref 70–110)
POCT GLUCOSE: 160 MG/DL (ref 70–110)
POCT GLUCOSE: 161 MG/DL (ref 70–110)
POCT GLUCOSE: 173 MG/DL (ref 70–110)
POCT GLUCOSE: 174 MG/DL (ref 70–110)
POCT GLUCOSE: 175 MG/DL (ref 70–110)
POCT GLUCOSE: 178 MG/DL (ref 70–110)
POCT GLUCOSE: 187 MG/DL (ref 70–110)
POCT GLUCOSE: 190 MG/DL (ref 70–110)
POCT GLUCOSE: 198 MG/DL (ref 70–110)
POCT GLUCOSE: 201 MG/DL (ref 70–110)
POCT GLUCOSE: 204 MG/DL (ref 70–110)
POCT GLUCOSE: 207 MG/DL (ref 70–110)
POCT GLUCOSE: 210 MG/DL (ref 70–110)
POCT GLUCOSE: 217 MG/DL (ref 70–110)
POCT GLUCOSE: 229 MG/DL (ref 70–110)
POCT GLUCOSE: 230 MG/DL (ref 70–110)
POCT GLUCOSE: 234 MG/DL (ref 70–110)
POCT GLUCOSE: 235 MG/DL (ref 70–110)
POCT GLUCOSE: 252 MG/DL (ref 70–110)
POCT GLUCOSE: 255 MG/DL (ref 70–110)
POCT GLUCOSE: 264 MG/DL (ref 70–110)
POCT GLUCOSE: 272 MG/DL (ref 70–110)
POCT GLUCOSE: 288 MG/DL (ref 70–110)
POCT GLUCOSE: 49 MG/DL (ref 70–110)
POCT GLUCOSE: 76 MG/DL (ref 70–110)
POTASSIUM BLD-SCNC: 4.7 MMOL/L (ref 3.5–5.1)
POTASSIUM SERPL-SCNC: 2.8 MMOL/L (ref 3.5–5.1)
POTASSIUM SERPL-SCNC: 3 MMOL/L (ref 3.5–5.1)
POTASSIUM SERPL-SCNC: 3.1 MMOL/L (ref 3.5–5.1)
POTASSIUM SERPL-SCNC: 3.4 MMOL/L (ref 3.5–5.1)
POTASSIUM SERPL-SCNC: 3.5 MMOL/L (ref 3.5–5.1)
POTASSIUM SERPL-SCNC: 3.5 MMOL/L (ref 3.5–5.1)
POTASSIUM SERPL-SCNC: 3.7 MMOL/L (ref 3.5–5.1)
POTASSIUM SERPL-SCNC: 3.9 MMOL/L (ref 3.5–5.1)
POTASSIUM SERPL-SCNC: 4 MMOL/L (ref 3.5–5.1)
POTASSIUM SERPL-SCNC: 4.1 MMOL/L (ref 3.5–5.1)
POTASSIUM SERPL-SCNC: 4.2 MMOL/L (ref 3.5–5.1)
POTASSIUM SERPL-SCNC: 4.3 MMOL/L (ref 3.5–5.1)
POTASSIUM SERPL-SCNC: 4.7 MMOL/L (ref 3.5–5.1)
POTASSIUM SERPL-SCNC: 4.9 MMOL/L (ref 3.5–5.1)
POTASSIUM SERPL-SCNC: 5.1 MMOL/L (ref 3.5–5.1)
POTASSIUM SERPL-SCNC: 5.3 MMOL/L (ref 3.5–5.1)
POTASSIUM SERPL-SCNC: 5.7 MMOL/L (ref 3.5–5.1)
PROCALCITONIN SERPL IA-MCNC: 0.23 NG/ML
PROT SERPL-MCNC: 6.2 G/DL (ref 6–8.4)
PROT SERPL-MCNC: 6.3 G/DL (ref 6–8.4)
PROT SERPL-MCNC: 6.4 G/DL (ref 6–8.4)
PROT SERPL-MCNC: 6.6 G/DL (ref 6–8.4)
PROT SERPL-MCNC: 6.6 G/DL (ref 6–8.4)
PROT SERPL-MCNC: 6.8 G/DL (ref 6–8.4)
PROT SERPL-MCNC: 7 G/DL (ref 6–8.4)
PROT SERPL-MCNC: 7.5 G/DL (ref 6–8.4)
PROT SERPL-MCNC: 7.7 G/DL (ref 6–8.4)
PROT SERPL-MCNC: 8.3 G/DL (ref 6–8.4)
PROT UR QL STRIP: ABNORMAL
PROT UR QL STRIP: NEGATIVE
PROTHROMBIN TIME: 19.9 SEC (ref 9–12.5)
PROTHROMBIN TIME: 22.2 SEC (ref 9–12.5)
PROTHROMBIN TIME: 26.9 SEC (ref 9–12.5)
PROTHROMBIN TIME: 29.6 SEC (ref 9–12.5)
PROTHROMBIN TIME: 29.8 SEC (ref 9–12.5)
PROTHROMBIN TIME: 33.2 SEC (ref 9–12.5)
PROTHROMBIN TIME: 46.6 SEC (ref 9–12.5)
PROTHROMBIN TIME: >100 SEC (ref 9–12.5)
PV MEAN GRADIENT: 1 MMHG
RA MAJOR: 6.49 CM
RA PRESSURE: 15 MMHG
RBC # BLD AUTO: 3.25 M/UL (ref 4.6–6.2)
RBC # BLD AUTO: 3.37 M/UL (ref 4.6–6.2)
RBC # BLD AUTO: 3.54 M/UL (ref 4.6–6.2)
RBC # BLD AUTO: 3.61 M/UL (ref 4.6–6.2)
RBC # BLD AUTO: 3.74 M/UL (ref 4.6–6.2)
RBC # BLD AUTO: 3.89 M/UL (ref 4.6–6.2)
RBC # BLD AUTO: 3.91 M/UL (ref 4.6–6.2)
RBC # BLD AUTO: 4.03 M/UL (ref 4.6–6.2)
RBC # BLD AUTO: 4.05 M/UL (ref 4.6–6.2)
RBC # BLD AUTO: 4.28 M/UL (ref 4.6–6.2)
RIGHT VENTRICULAR END-DIASTOLIC DIMENSION: 5.29 CM
SALICYLATES SERPL-MCNC: <5 MG/DL (ref 15–30)
SAMPLE: ABNORMAL
SARS-COV-2 RDRP RESP QL NAA+PROBE: NEGATIVE
SATURATED IRON: 3 % (ref 20–50)
SINUS: 3.39 CM
SITE: ABNORMAL
SODIUM BLD-SCNC: 135 MMOL/L (ref 136–145)
SODIUM SERPL-SCNC: 138 MMOL/L (ref 136–145)
SODIUM SERPL-SCNC: 138 MMOL/L (ref 136–145)
SODIUM SERPL-SCNC: 139 MMOL/L (ref 136–145)
SODIUM SERPL-SCNC: 140 MMOL/L (ref 136–145)
SODIUM SERPL-SCNC: 141 MMOL/L (ref 136–145)
SODIUM SERPL-SCNC: 142 MMOL/L (ref 136–145)
SODIUM SERPL-SCNC: 143 MMOL/L (ref 136–145)
SODIUM SERPL-SCNC: 143 MMOL/L (ref 136–145)
SODIUM SERPL-SCNC: 144 MMOL/L (ref 136–145)
SODIUM SERPL-SCNC: 146 MMOL/L (ref 136–145)
SODIUM SERPL-SCNC: 147 MMOL/L (ref 136–145)
SODIUM UR-SCNC: <20 MMOL/L (ref 20–250)
SP GR UR STRIP: 1.02 (ref 1–1.03)
SP GR UR STRIP: >=1.03 (ref 1–1.03)
SP02: 100
STJ: 3.23 CM
TDI LATERAL: 0.09 M/S
TDI SEPTAL: 0.04 M/S
TDI: 0.07 M/S
TOTAL IRON BINDING CAPACITY: 395 UG/DL (ref 250–450)
TR MAX PG: 52 MMHG
TRANSFERRIN SERPL-MCNC: 267 MG/DL (ref 200–375)
TRICUSPID ANNULAR PLANE SYSTOLIC EXCURSION: 0.99 CM
TRIGL SERPL-MCNC: 117 MG/DL (ref 30–150)
TRIGL SERPL-MCNC: 125 MG/DL (ref 30–150)
TRIGL SERPL-MCNC: 72 MG/DL (ref 30–150)
TROPONIN I SERPL DL<=0.01 NG/ML-MCNC: 0.02 NG/ML (ref 0–0.03)
TROPONIN I SERPL DL<=0.01 NG/ML-MCNC: 2.41 NG/ML (ref 0–0.03)
TROPONIN I SERPL DL<=0.01 NG/ML-MCNC: 3.97 NG/ML (ref 0–0.03)
TV REST PULMONARY ARTERY PRESSURE: 67 MMHG
URN SPEC COLLECT METH UR: ABNORMAL
URN SPEC COLLECT METH UR: ABNORMAL
UROBILINOGEN UR STRIP-ACNC: 1 EU/DL
UROBILINOGEN UR STRIP-ACNC: >=8 EU/DL
VIT B12 SERPL-MCNC: >2000 PG/ML (ref 210–950)
VT: 450
WBC # BLD AUTO: 10.2 K/UL (ref 3.9–12.7)
WBC # BLD AUTO: 3.94 K/UL (ref 3.9–12.7)
WBC # BLD AUTO: 4.04 K/UL (ref 3.9–12.7)
WBC # BLD AUTO: 4.89 K/UL (ref 3.9–12.7)
WBC # BLD AUTO: 5.42 K/UL (ref 3.9–12.7)
WBC # BLD AUTO: 6.46 K/UL (ref 3.9–12.7)
WBC # BLD AUTO: 8.17 K/UL (ref 3.9–12.7)
WBC # BLD AUTO: 8.41 K/UL (ref 3.9–12.7)
WBC # BLD AUTO: 9.56 K/UL (ref 3.9–12.7)
WBC # BLD AUTO: 9.92 K/UL (ref 3.9–12.7)

## 2021-01-01 PROCEDURE — 80048 BASIC METABOLIC PNL TOTAL CA: CPT | Mod: 91

## 2021-01-01 PROCEDURE — 80053 COMPREHEN METABOLIC PANEL: CPT

## 2021-01-01 PROCEDURE — 36600 WITHDRAWAL OF ARTERIAL BLOOD: CPT

## 2021-01-01 PROCEDURE — 63600175 PHARM REV CODE 636 W HCPCS: Performed by: INTERNAL MEDICINE

## 2021-01-01 PROCEDURE — 25000003 PHARM REV CODE 250: Performed by: NURSE PRACTITIONER

## 2021-01-01 PROCEDURE — 97802 MEDICAL NUTRITION INDIV IN: CPT

## 2021-01-01 PROCEDURE — 93005 ELECTROCARDIOGRAM TRACING: CPT

## 2021-01-01 PROCEDURE — 31500 PR INSERT, EMERGENCY ENDOTRACH AIRWAY: ICD-10-PCS | Mod: ,,, | Performed by: NURSE PRACTITIONER

## 2021-01-01 PROCEDURE — 99291 CRITICAL CARE FIRST HOUR: CPT | Mod: ,,, | Performed by: NURSE PRACTITIONER

## 2021-01-01 PROCEDURE — 99900035 HC TECH TIME PER 15 MIN (STAT)

## 2021-01-01 PROCEDURE — 25000003 PHARM REV CODE 250: Performed by: INTERNAL MEDICINE

## 2021-01-01 PROCEDURE — 99291 CRITICAL CARE FIRST HOUR: CPT | Mod: ,,, | Performed by: FAMILY MEDICINE

## 2021-01-01 PROCEDURE — 83605 ASSAY OF LACTIC ACID: CPT

## 2021-01-01 PROCEDURE — 99232 SBSQ HOSP IP/OBS MODERATE 35: CPT | Mod: GT,,, | Performed by: FAMILY MEDICINE

## 2021-01-01 PROCEDURE — 92950 HEART/LUNG RESUSCITATION CPR: CPT

## 2021-01-01 PROCEDURE — 25000003 PHARM REV CODE 250: Performed by: SURGERY

## 2021-01-01 PROCEDURE — 85025 COMPLETE CBC W/AUTO DIFF WBC: CPT

## 2021-01-01 PROCEDURE — 20000000 HC ICU ROOM

## 2021-01-01 PROCEDURE — 83880 ASSAY OF NATRIURETIC PEPTIDE: CPT

## 2021-01-01 PROCEDURE — 25000003 PHARM REV CODE 250: Performed by: EMERGENCY MEDICINE

## 2021-01-01 PROCEDURE — 63600175 PHARM REV CODE 636 W HCPCS: Performed by: NURSE PRACTITIONER

## 2021-01-01 PROCEDURE — 94003 VENT MGMT INPAT SUBQ DAY: CPT

## 2021-01-01 PROCEDURE — 36430 TRANSFUSION BLD/BLD COMPNT: CPT

## 2021-01-01 PROCEDURE — 84300 ASSAY OF URINE SODIUM: CPT

## 2021-01-01 PROCEDURE — 85730 THROMBOPLASTIN TIME PARTIAL: CPT

## 2021-01-01 PROCEDURE — 83605 ASSAY OF LACTIC ACID: CPT | Mod: 91

## 2021-01-01 PROCEDURE — 96374 THER/PROPH/DIAG INJ IV PUSH: CPT

## 2021-01-01 PROCEDURE — 83735 ASSAY OF MAGNESIUM: CPT | Mod: 91

## 2021-01-01 PROCEDURE — 82803 BLOOD GASES ANY COMBINATION: CPT

## 2021-01-01 PROCEDURE — 80179 DRUG ASSAY SALICYLATE: CPT

## 2021-01-01 PROCEDURE — 97803 MED NUTRITION INDIV SUBSEQ: CPT

## 2021-01-01 PROCEDURE — 99233 SBSQ HOSP IP/OBS HIGH 50: CPT | Mod: ,,, | Performed by: INTERNAL MEDICINE

## 2021-01-01 PROCEDURE — 99233 PR SUBSEQUENT HOSPITAL CARE,LEVL III: ICD-10-PCS | Mod: ,,, | Performed by: INTERNAL MEDICINE

## 2021-01-01 PROCEDURE — 25000003 PHARM REV CODE 250: Performed by: FAMILY MEDICINE

## 2021-01-01 PROCEDURE — 86706 HEP B SURFACE ANTIBODY: CPT

## 2021-01-01 PROCEDURE — 99223 1ST HOSP IP/OBS HIGH 75: CPT | Mod: ,,, | Performed by: INTERNAL MEDICINE

## 2021-01-01 PROCEDURE — 85610 PROTHROMBIN TIME: CPT

## 2021-01-01 PROCEDURE — 99900026 HC AIRWAY MAINTENANCE (STAT)

## 2021-01-01 PROCEDURE — 99291 PR CRITICAL CARE, E/M 30-74 MINUTES: ICD-10-PCS | Mod: GT,,, | Performed by: FAMILY MEDICINE

## 2021-01-01 PROCEDURE — 94002 VENT MGMT INPAT INIT DAY: CPT

## 2021-01-01 PROCEDURE — 36415 COLL VENOUS BLD VENIPUNCTURE: CPT

## 2021-01-01 PROCEDURE — 25500020 PHARM REV CODE 255: Performed by: FAMILY MEDICINE

## 2021-01-01 PROCEDURE — 80143 DRUG ASSAY ACETAMINOPHEN: CPT

## 2021-01-01 PROCEDURE — 85610 PROTHROMBIN TIME: CPT | Mod: 91

## 2021-01-01 PROCEDURE — 82570 ASSAY OF URINE CREATININE: CPT

## 2021-01-01 PROCEDURE — 27100171 HC OXYGEN HIGH FLOW UP TO 24 HOURS

## 2021-01-01 PROCEDURE — 81003 URINALYSIS AUTO W/O SCOPE: CPT

## 2021-01-01 PROCEDURE — 43752 NASAL/OROGASTRIC W/TUBE PLMT: CPT

## 2021-01-01 PROCEDURE — 99291 CRITICAL CARE FIRST HOUR: CPT | Mod: GT,,, | Performed by: FAMILY MEDICINE

## 2021-01-01 PROCEDURE — 85379 FIBRIN DEGRADATION QUANT: CPT

## 2021-01-01 PROCEDURE — 82077 ASSAY SPEC XCP UR&BREATH IA: CPT

## 2021-01-01 PROCEDURE — 86900 BLOOD TYPING SEROLOGIC ABO: CPT

## 2021-01-01 PROCEDURE — 80048 BASIC METABOLIC PNL TOTAL CA: CPT

## 2021-01-01 PROCEDURE — 93010 EKG 12-LEAD: ICD-10-PCS | Mod: ,,, | Performed by: INTERNAL MEDICINE

## 2021-01-01 PROCEDURE — 82746 ASSAY OF FOLIC ACID SERUM: CPT

## 2021-01-01 PROCEDURE — 99232 PR SUBSEQUENT HOSPITAL CARE,LEVL II: ICD-10-PCS | Mod: GT,,, | Performed by: FAMILY MEDICINE

## 2021-01-01 PROCEDURE — 99232 PR SUBSEQUENT HOSPITAL CARE,LEVL II: ICD-10-PCS | Mod: ,,, | Performed by: INTERNAL MEDICINE

## 2021-01-01 PROCEDURE — 99291 PR CRITICAL CARE, E/M 30-74 MINUTES: ICD-10-PCS | Mod: 25,,, | Performed by: NURSE PRACTITIONER

## 2021-01-01 PROCEDURE — 94761 N-INVAS EAR/PLS OXIMETRY MLT: CPT

## 2021-01-01 PROCEDURE — 82140 ASSAY OF AMMONIA: CPT | Mod: 91

## 2021-01-01 PROCEDURE — 83615 LACTATE (LD) (LDH) ENZYME: CPT

## 2021-01-01 PROCEDURE — 83735 ASSAY OF MAGNESIUM: CPT

## 2021-01-01 PROCEDURE — 83930 ASSAY OF BLOOD OSMOLALITY: CPT

## 2021-01-01 PROCEDURE — 31500 INSERT EMERGENCY AIRWAY: CPT

## 2021-01-01 PROCEDURE — 84100 ASSAY OF PHOSPHORUS: CPT

## 2021-01-01 PROCEDURE — 99291 PR CRITICAL CARE, E/M 30-74 MINUTES: ICD-10-PCS | Mod: ,,, | Performed by: NURSE PRACTITIONER

## 2021-01-01 PROCEDURE — 99223 PR INITIAL HOSPITAL CARE,LEVL III: ICD-10-PCS | Mod: ,,, | Performed by: INTERNAL MEDICINE

## 2021-01-01 PROCEDURE — 85014 HEMATOCRIT: CPT

## 2021-01-01 PROCEDURE — U0002 COVID-19 LAB TEST NON-CDC: HCPCS

## 2021-01-01 PROCEDURE — 27000221 HC OXYGEN, UP TO 24 HOURS

## 2021-01-01 PROCEDURE — 97530 THERAPEUTIC ACTIVITIES: CPT

## 2021-01-01 PROCEDURE — 82728 ASSAY OF FERRITIN: CPT

## 2021-01-01 PROCEDURE — 80162 ASSAY OF DIGOXIN TOTAL: CPT

## 2021-01-01 PROCEDURE — 80074 ACUTE HEPATITIS PANEL: CPT

## 2021-01-01 PROCEDURE — 80053 COMPREHEN METABOLIC PANEL: CPT | Mod: 91

## 2021-01-01 PROCEDURE — 84295 ASSAY OF SERUM SODIUM: CPT

## 2021-01-01 PROCEDURE — 85025 COMPLETE CBC W/AUTO DIFF WBC: CPT | Mod: 91

## 2021-01-01 PROCEDURE — 84484 ASSAY OF TROPONIN QUANT: CPT

## 2021-01-01 PROCEDURE — 63600175 PHARM REV CODE 636 W HCPCS: Performed by: EMERGENCY MEDICINE

## 2021-01-01 PROCEDURE — 85018 HEMOGLOBIN: CPT

## 2021-01-01 PROCEDURE — 93010 ELECTROCARDIOGRAM REPORT: CPT | Mod: ,,, | Performed by: INTERNAL MEDICINE

## 2021-01-01 PROCEDURE — 80076 HEPATIC FUNCTION PANEL: CPT

## 2021-01-01 PROCEDURE — 99232 SBSQ HOSP IP/OBS MODERATE 35: CPT | Mod: ,,, | Performed by: INTERNAL MEDICINE

## 2021-01-01 PROCEDURE — 84478 ASSAY OF TRIGLYCERIDES: CPT

## 2021-01-01 PROCEDURE — 99291 CRITICAL CARE FIRST HOUR: CPT | Mod: 25

## 2021-01-01 PROCEDURE — 82140 ASSAY OF AMMONIA: CPT

## 2021-01-01 PROCEDURE — 63600175 PHARM REV CODE 636 W HCPCS

## 2021-01-01 PROCEDURE — 25000003 PHARM REV CODE 250

## 2021-01-01 PROCEDURE — 82330 ASSAY OF CALCIUM: CPT

## 2021-01-01 PROCEDURE — 84132 ASSAY OF SERUM POTASSIUM: CPT

## 2021-01-01 PROCEDURE — P9017 PLASMA 1 DONOR FRZ W/IN 8 HR: HCPCS

## 2021-01-01 PROCEDURE — 87040 BLOOD CULTURE FOR BACTERIA: CPT

## 2021-01-01 PROCEDURE — 84145 PROCALCITONIN (PCT): CPT

## 2021-01-01 PROCEDURE — 99291 PR CRITICAL CARE, E/M 30-74 MINUTES: ICD-10-PCS | Mod: ,,, | Performed by: FAMILY MEDICINE

## 2021-01-01 PROCEDURE — 31500 INSERT EMERGENCY AIRWAY: CPT | Mod: ,,, | Performed by: NURSE PRACTITIONER

## 2021-01-01 PROCEDURE — 97166 OT EVAL MOD COMPLEX 45 MIN: CPT

## 2021-01-01 PROCEDURE — 83930 ASSAY OF BLOOD OSMOLALITY: CPT | Mod: 91

## 2021-01-01 PROCEDURE — 97162 PT EVAL MOD COMPLEX 30 MIN: CPT

## 2021-01-01 PROCEDURE — 31720 CLEARANCE OF AIRWAYS: CPT

## 2021-01-01 PROCEDURE — 82607 VITAMIN B-12: CPT

## 2021-01-01 PROCEDURE — 99291 CRITICAL CARE FIRST HOUR: CPT | Mod: 25,,, | Performed by: NURSE PRACTITIONER

## 2021-01-01 PROCEDURE — 63600175 PHARM REV CODE 636 W HCPCS: Performed by: FAMILY MEDICINE

## 2021-01-01 PROCEDURE — 82800 BLOOD PH: CPT

## 2021-01-01 PROCEDURE — 82550 ASSAY OF CK (CPK): CPT

## 2021-01-01 PROCEDURE — 83540 ASSAY OF IRON: CPT

## 2021-01-01 RX ORDER — ALPRAZOLAM 0.5 MG/1
0.5 TABLET ORAL ONCE
Status: COMPLETED | OUTPATIENT
Start: 2021-01-01 | End: 2021-01-01

## 2021-01-01 RX ORDER — INDOMETHACIN 25 MG/1
50 CAPSULE ORAL ONCE
Status: COMPLETED | OUTPATIENT
Start: 2021-01-01 | End: 2021-01-01

## 2021-01-01 RX ORDER — AMIODARONE HYDROCHLORIDE 200 MG/1
200 TABLET ORAL DAILY
Status: DISCONTINUED | OUTPATIENT
Start: 2021-01-01 | End: 2021-01-01

## 2021-01-01 RX ORDER — EPINEPHRINE 0.1 MG/ML
INJECTION INTRAVENOUS CODE/TRAUMA/SEDATION MEDICATION
Status: COMPLETED | OUTPATIENT
Start: 2021-01-01 | End: 2021-01-01

## 2021-01-01 RX ORDER — POLYETHYLENE GLYCOL 3350 17 G/17G
17 POWDER, FOR SOLUTION ORAL DAILY
Status: DISCONTINUED | OUTPATIENT
Start: 2021-01-01 | End: 2021-01-01

## 2021-01-01 RX ORDER — BISACODYL 10 MG
10 SUPPOSITORY, RECTAL RECTAL DAILY
Status: DISCONTINUED | OUTPATIENT
Start: 2021-01-01 | End: 2021-01-01

## 2021-01-01 RX ORDER — CARVEDILOL 12.5 MG/1
12.5 TABLET ORAL 2 TIMES DAILY WITH MEALS
Status: DISCONTINUED | OUTPATIENT
Start: 2021-01-01 | End: 2021-01-01

## 2021-01-01 RX ORDER — PROPOFOL 10 MG/ML
0-50 INJECTION, EMULSION INTRAVENOUS CONTINUOUS
Status: DISCONTINUED | OUTPATIENT
Start: 2021-01-01 | End: 2021-01-01

## 2021-01-01 RX ORDER — ONDANSETRON 2 MG/ML
8 INJECTION INTRAMUSCULAR; INTRAVENOUS EVERY 8 HOURS PRN
Status: DISCONTINUED | OUTPATIENT
Start: 2021-01-01 | End: 2021-01-01 | Stop reason: HOSPADM

## 2021-01-01 RX ORDER — CHLORHEXIDINE GLUCONATE ORAL RINSE 1.2 MG/ML
15 SOLUTION DENTAL 2 TIMES DAILY
Status: DISCONTINUED | OUTPATIENT
Start: 2021-01-01 | End: 2021-01-01

## 2021-01-01 RX ORDER — SODIUM BICARBONATE 1 MEQ/ML
SYRINGE (ML) INTRAVENOUS CODE/TRAUMA/SEDATION MEDICATION
Status: COMPLETED | OUTPATIENT
Start: 2021-01-01 | End: 2021-01-01

## 2021-01-01 RX ORDER — SODIUM CHLORIDE 0.9 % (FLUSH) 0.9 %
10 SYRINGE (ML) INJECTION
Status: DISCONTINUED | OUTPATIENT
Start: 2021-01-01 | End: 2021-01-01 | Stop reason: HOSPADM

## 2021-01-01 RX ORDER — DOBUTAMINE HYDROCHLORIDE 400 MG/100ML
2.5 INJECTION INTRAVENOUS CONTINUOUS
Status: DISCONTINUED | OUTPATIENT
Start: 2021-01-01 | End: 2021-01-01

## 2021-01-01 RX ORDER — INSULIN ASPART 100 [IU]/ML
0-5 INJECTION, SOLUTION INTRAVENOUS; SUBCUTANEOUS EVERY 4 HOURS PRN
Status: DISCONTINUED | OUTPATIENT
Start: 2021-01-01 | End: 2021-01-01

## 2021-01-01 RX ORDER — SODIUM,POTASSIUM PHOSPHATES 280-250MG
2 POWDER IN PACKET (EA) ORAL
Status: DISCONTINUED | OUTPATIENT
Start: 2021-01-01 | End: 2021-01-01

## 2021-01-01 RX ORDER — GLUCAGON 1 MG
1 KIT INJECTION
Status: DISCONTINUED | OUTPATIENT
Start: 2021-01-01 | End: 2021-01-01

## 2021-01-01 RX ORDER — METOPROLOL TARTRATE 1 MG/ML
2.5 INJECTION, SOLUTION INTRAVENOUS ONCE
Status: COMPLETED | OUTPATIENT
Start: 2021-01-01 | End: 2021-01-01

## 2021-01-01 RX ORDER — POTASSIUM CHLORIDE 7.45 MG/ML
10 INJECTION INTRAVENOUS
Status: COMPLETED | OUTPATIENT
Start: 2021-01-01 | End: 2021-01-01

## 2021-01-01 RX ORDER — HYDROCODONE BITARTRATE AND ACETAMINOPHEN 500; 5 MG/1; MG/1
TABLET ORAL
Status: DISCONTINUED | OUTPATIENT
Start: 2021-01-01 | End: 2021-01-01

## 2021-01-01 RX ORDER — INDOMETHACIN 25 MG/1
CAPSULE ORAL
Status: COMPLETED
Start: 2021-01-01 | End: 2021-01-01

## 2021-01-01 RX ORDER — LANOLIN ALCOHOL/MO/W.PET/CERES
800 CREAM (GRAM) TOPICAL
Status: DISCONTINUED | OUTPATIENT
Start: 2021-01-01 | End: 2021-01-01

## 2021-01-01 RX ORDER — FAMOTIDINE 10 MG/ML
20 INJECTION INTRAVENOUS 2 TIMES DAILY
Status: DISCONTINUED | OUTPATIENT
Start: 2021-01-01 | End: 2021-01-01 | Stop reason: DRUGHIGH

## 2021-01-01 RX ORDER — ETOMIDATE 2 MG/ML
20 INJECTION INTRAVENOUS ONCE
Status: DISCONTINUED | OUTPATIENT
Start: 2021-01-01 | End: 2021-01-01

## 2021-01-01 RX ORDER — FAMOTIDINE 40 MG/5ML
20 POWDER, FOR SUSPENSION ORAL DAILY
Status: DISCONTINUED | OUTPATIENT
Start: 2021-01-01 | End: 2021-01-01

## 2021-01-01 RX ORDER — CARVEDILOL 6.25 MG/1
6.25 TABLET ORAL 2 TIMES DAILY WITH MEALS
Status: DISCONTINUED | OUTPATIENT
Start: 2021-01-01 | End: 2021-01-01

## 2021-01-01 RX ORDER — FOLIC ACID 1 MG/1
1 TABLET ORAL DAILY
Status: DISCONTINUED | OUTPATIENT
Start: 2021-01-01 | End: 2021-01-01

## 2021-01-01 RX ORDER — FAMOTIDINE 40 MG/5ML
20 POWDER, FOR SUSPENSION ORAL 2 TIMES DAILY
Status: DISCONTINUED | OUTPATIENT
Start: 2021-01-01 | End: 2021-01-01

## 2021-01-01 RX ORDER — DIGOXIN 125 MCG
250 TABLET ORAL DAILY
Status: DISCONTINUED | OUTPATIENT
Start: 2021-01-01 | End: 2021-01-01

## 2021-01-01 RX ORDER — LACTULOSE 10 G/15ML
20 SOLUTION ORAL 2 TIMES DAILY
Status: COMPLETED | OUTPATIENT
Start: 2021-01-01 | End: 2021-01-01

## 2021-01-01 RX ORDER — FENTANYL CITRATE-0.9 % NACL/PF 10 MCG/ML
0-250 PLASTIC BAG, INJECTION (ML) INTRAVENOUS CONTINUOUS
Status: DISCONTINUED | OUTPATIENT
Start: 2021-01-01 | End: 2021-01-01

## 2021-01-01 RX ORDER — DOBUTAMINE HYDROCHLORIDE 400 MG/100ML
5 INJECTION INTRAVENOUS CONTINUOUS
Status: DISCONTINUED | OUTPATIENT
Start: 2021-01-01 | End: 2021-01-01

## 2021-01-01 RX ORDER — FAMOTIDINE 10 MG/ML
20 INJECTION INTRAVENOUS 2 TIMES DAILY
Status: DISCONTINUED | OUTPATIENT
Start: 2021-01-01 | End: 2021-01-01

## 2021-01-01 RX ORDER — INDOMETHACIN 25 MG/1
50 CAPSULE ORAL
Status: COMPLETED | OUTPATIENT
Start: 2021-01-01 | End: 2021-01-01

## 2021-01-01 RX ORDER — INSULIN ASPART 100 [IU]/ML
0-5 INJECTION, SOLUTION INTRAVENOUS; SUBCUTANEOUS EVERY 6 HOURS PRN
Status: DISCONTINUED | OUTPATIENT
Start: 2021-01-01 | End: 2021-01-01

## 2021-01-01 RX ORDER — LACTULOSE 10 G/15ML
20 SOLUTION ORAL DAILY
Status: DISCONTINUED | OUTPATIENT
Start: 2021-01-01 | End: 2021-01-01

## 2021-01-01 RX ORDER — MUPIROCIN 20 MG/G
OINTMENT TOPICAL 2 TIMES DAILY
Status: COMPLETED | OUTPATIENT
Start: 2021-01-01 | End: 2021-01-01

## 2021-01-01 RX ORDER — BISACODYL 10 MG
10 SUPPOSITORY, RECTAL RECTAL DAILY PRN
Status: DISCONTINUED | OUTPATIENT
Start: 2021-01-01 | End: 2021-01-01

## 2021-01-01 RX ORDER — THIAMINE HCL 100 MG
100 TABLET ORAL DAILY
Status: DISCONTINUED | OUTPATIENT
Start: 2021-01-01 | End: 2021-01-01

## 2021-01-01 RX ORDER — ROCURONIUM BROMIDE 10 MG/ML
70 INJECTION, SOLUTION INTRAVENOUS ONCE
Status: COMPLETED | OUTPATIENT
Start: 2021-01-01 | End: 2021-01-01

## 2021-01-01 RX ORDER — FAMOTIDINE 10 MG/ML
20 INJECTION INTRAVENOUS DAILY
Status: DISCONTINUED | OUTPATIENT
Start: 2021-01-01 | End: 2021-01-01

## 2021-01-01 RX ORDER — PREDNISOLONE 15 MG/5ML
40 SOLUTION ORAL DAILY
Status: DISCONTINUED | OUTPATIENT
Start: 2021-01-01 | End: 2021-01-01

## 2021-01-01 RX ORDER — ROCURONIUM BROMIDE 10 MG/ML
70 INJECTION, SOLUTION INTRAVENOUS ONCE
Status: DISCONTINUED | OUTPATIENT
Start: 2021-01-01 | End: 2021-01-01

## 2021-01-01 RX ORDER — PROPOFOL 10 MG/ML
INJECTION, EMULSION INTRAVENOUS
Status: COMPLETED
Start: 2021-01-01 | End: 2021-01-01

## 2021-01-01 RX ORDER — ONDANSETRON 2 MG/ML
4 INJECTION INTRAMUSCULAR; INTRAVENOUS EVERY 8 HOURS PRN
Status: DISCONTINUED | OUTPATIENT
Start: 2021-01-01 | End: 2021-01-01

## 2021-01-01 RX ORDER — FUROSEMIDE 10 MG/ML
40 INJECTION INTRAMUSCULAR; INTRAVENOUS
Status: COMPLETED | OUTPATIENT
Start: 2021-01-01 | End: 2021-01-01

## 2021-01-01 RX ORDER — FAMOTIDINE 20 MG/1
20 TABLET, FILM COATED ORAL 2 TIMES DAILY
Status: DISCONTINUED | OUTPATIENT
Start: 2021-01-01 | End: 2021-01-01

## 2021-01-01 RX ORDER — INDOMETHACIN 25 MG/1
50 CAPSULE ORAL ONCE
Status: DISCONTINUED | OUTPATIENT
Start: 2021-01-01 | End: 2021-01-01

## 2021-01-01 RX ORDER — MORPHINE SULFATE 4 MG/ML
4 INJECTION, SOLUTION INTRAMUSCULAR; INTRAVENOUS EVERY 30 MIN PRN
Status: DISCONTINUED | OUTPATIENT
Start: 2021-01-01 | End: 2021-01-01 | Stop reason: HOSPADM

## 2021-01-01 RX ORDER — INSULIN ASPART 100 [IU]/ML
0-5 INJECTION, SOLUTION INTRAVENOUS; SUBCUTANEOUS 4 TIMES DAILY PRN
Status: DISCONTINUED | OUTPATIENT
Start: 2021-01-01 | End: 2021-01-01

## 2021-01-01 RX ORDER — ETOMIDATE 2 MG/ML
30 INJECTION INTRAVENOUS ONCE
Status: COMPLETED | OUTPATIENT
Start: 2021-01-01 | End: 2021-01-01

## 2021-01-01 RX ADMIN — ETOMIDATE 30 MG: 2 INJECTION INTRAVENOUS at 04:02

## 2021-01-01 RX ADMIN — MUPIROCIN: 20 OINTMENT TOPICAL at 08:02

## 2021-01-01 RX ADMIN — CHLORHEXIDINE GLUCONATE 0.12% ORAL RINSE 15 ML: 1.2 LIQUID ORAL at 09:02

## 2021-01-01 RX ADMIN — PREDNISOLONE 39.99 MG: 15 SOLUTION ORAL at 08:02

## 2021-01-01 RX ADMIN — FENTANYL CITRATE 200 MCG/HR: 50 INJECTION, SOLUTION INTRAMUSCULAR; INTRAVENOUS at 11:02

## 2021-01-01 RX ADMIN — PROPOFOL 15 MCG/KG/MIN: 10 INJECTION, EMULSION INTRAVENOUS at 03:02

## 2021-01-01 RX ADMIN — DIGOXIN 250 MCG: 125 TABLET ORAL at 09:02

## 2021-01-01 RX ADMIN — AMIODARONE HYDROCHLORIDE 200 MG: 200 TABLET ORAL at 08:02

## 2021-01-01 RX ADMIN — SODIUM BICARBONATE: 84 INJECTION, SOLUTION INTRAVENOUS at 07:02

## 2021-01-01 RX ADMIN — PROPOFOL 20 MCG/KG/MIN: 10 INJECTION, EMULSION INTRAVENOUS at 08:02

## 2021-01-01 RX ADMIN — FAMOTIDINE 20 MG: 20 TABLET, FILM COATED ORAL at 09:02

## 2021-01-01 RX ADMIN — INSULIN ASPART 3 UNITS: 100 INJECTION, SOLUTION INTRAVENOUS; SUBCUTANEOUS at 11:02

## 2021-01-01 RX ADMIN — PROPOFOL 50 MCG/KG/MIN: 10 INJECTION, EMULSION INTRAVENOUS at 02:02

## 2021-01-01 RX ADMIN — MUPIROCIN: 20 OINTMENT TOPICAL at 09:02

## 2021-01-01 RX ADMIN — POLYETHYLENE GLYCOL 3350 17 G: 17 POWDER, FOR SOLUTION ORAL at 11:02

## 2021-01-01 RX ADMIN — INSULIN ASPART 2 UNITS: 100 INJECTION, SOLUTION INTRAVENOUS; SUBCUTANEOUS at 08:02

## 2021-01-01 RX ADMIN — PROPOFOL 50 MCG/KG/MIN: 10 INJECTION, EMULSION INTRAVENOUS at 08:02

## 2021-01-01 RX ADMIN — LOSARTAN POTASSIUM 12.5 MG: 25 TABLET, FILM COATED ORAL at 08:02

## 2021-01-01 RX ADMIN — INSULIN ASPART 2 UNITS: 100 INJECTION, SOLUTION INTRAVENOUS; SUBCUTANEOUS at 03:02

## 2021-01-01 RX ADMIN — PHYTONADIONE 10 MG: 10 INJECTION, EMULSION INTRAMUSCULAR; INTRAVENOUS; SUBCUTANEOUS at 01:02

## 2021-01-01 RX ADMIN — PROPOFOL 20 MCG/KG/MIN: 10 INJECTION, EMULSION INTRAVENOUS at 04:02

## 2021-01-01 RX ADMIN — POTASSIUM CHLORIDE 10 MEQ: 7.46 INJECTION, SOLUTION INTRAVENOUS at 01:02

## 2021-01-01 RX ADMIN — IRON SUCROSE 200 MG: 20 INJECTION, SOLUTION INTRAVENOUS at 09:02

## 2021-01-01 RX ADMIN — PHYTONADIONE 10 MG: 10 INJECTION, EMULSION INTRAMUSCULAR; INTRAVENOUS; SUBCUTANEOUS at 09:02

## 2021-01-01 RX ADMIN — CHLORHEXIDINE GLUCONATE 0.12% ORAL RINSE 15 ML: 1.2 LIQUID ORAL at 08:02

## 2021-01-01 RX ADMIN — IRON SUCROSE 200 MG: 20 INJECTION, SOLUTION INTRAVENOUS at 08:02

## 2021-01-01 RX ADMIN — Medication 100 MG: at 08:02

## 2021-01-01 RX ADMIN — LORAZEPAM 2 MG: 2 INJECTION INTRAMUSCULAR; INTRAVENOUS at 02:02

## 2021-01-01 RX ADMIN — CARVEDILOL 12.5 MG: 12.5 TABLET, FILM COATED ORAL at 04:02

## 2021-01-01 RX ADMIN — FOLIC ACID 1 MG: 1 TABLET ORAL at 08:02

## 2021-01-01 RX ADMIN — DIGOXIN 250 MCG: 125 TABLET ORAL at 08:02

## 2021-01-01 RX ADMIN — PREDNISOLONE 39.99 MG: 15 SOLUTION ORAL at 09:02

## 2021-01-01 RX ADMIN — CARVEDILOL 12.5 MG: 12.5 TABLET, FILM COATED ORAL at 08:02

## 2021-01-01 RX ADMIN — FAMOTIDINE 20 MG: 10 INJECTION INTRAVENOUS at 08:02

## 2021-01-01 RX ADMIN — POLYETHYLENE GLYCOL 3350 17 G: 17 POWDER, FOR SOLUTION ORAL at 09:02

## 2021-01-01 RX ADMIN — INDOMETHACIN 50 MEQ: 25 CAPSULE ORAL at 06:02

## 2021-01-01 RX ADMIN — FAMOTIDINE 20 MG: 20 TABLET, FILM COATED ORAL at 08:02

## 2021-01-01 RX ADMIN — INSULIN ASPART 1 UNITS: 100 INJECTION, SOLUTION INTRAVENOUS; SUBCUTANEOUS at 11:02

## 2021-01-01 RX ADMIN — FAMOTIDINE 20 MG: 10 INJECTION INTRAVENOUS at 05:02

## 2021-01-01 RX ADMIN — THIAMINE HYDROCHLORIDE 100 MG: 100 INJECTION, SOLUTION INTRAMUSCULAR; INTRAVENOUS at 01:02

## 2021-01-01 RX ADMIN — PHYTONADIONE 5 MG: 10 INJECTION, EMULSION INTRAMUSCULAR; INTRAVENOUS; SUBCUTANEOUS at 03:02

## 2021-01-01 RX ADMIN — POTASSIUM CHLORIDE 10 MEQ: 7.46 INJECTION, SOLUTION INTRAVENOUS at 08:02

## 2021-01-01 RX ADMIN — POTASSIUM CHLORIDE 10 MEQ: 7.46 INJECTION, SOLUTION INTRAVENOUS at 10:02

## 2021-01-01 RX ADMIN — POTASSIUM BICARBONATE 50 MEQ: 977.5 TABLET, EFFERVESCENT ORAL at 06:02

## 2021-01-01 RX ADMIN — PROPOFOL 5 MCG/KG/MIN: 10 INJECTION, EMULSION INTRAVENOUS at 04:02

## 2021-01-01 RX ADMIN — POLYETHYLENE GLYCOL 3350 17 G: 17 POWDER, FOR SOLUTION ORAL at 08:02

## 2021-01-01 RX ADMIN — FENTANYL CITRATE 200 MCG/HR: 50 INJECTION, SOLUTION INTRAMUSCULAR; INTRAVENOUS at 04:02

## 2021-01-01 RX ADMIN — SODIUM BICARBONATE 50 MEQ: 84 INJECTION, SOLUTION INTRAVENOUS at 12:02

## 2021-01-01 RX ADMIN — CARVEDILOL 12.5 MG: 12.5 TABLET, FILM COATED ORAL at 11:02

## 2021-01-01 RX ADMIN — FENTANYL CITRATE 175 MCG/HR: 50 INJECTION, SOLUTION INTRAMUSCULAR; INTRAVENOUS at 01:02

## 2021-01-01 RX ADMIN — SODIUM BICARBONATE 50 MEQ: 84 INJECTION, SOLUTION INTRAVENOUS at 03:02

## 2021-01-01 RX ADMIN — FAMOTIDINE 20 MG: 40 POWDER, FOR SUSPENSION ORAL at 08:02

## 2021-01-01 RX ADMIN — POTASSIUM CHLORIDE 10 MEQ: 7.46 INJECTION, SOLUTION INTRAVENOUS at 11:02

## 2021-01-01 RX ADMIN — INSULIN ASPART 2 UNITS: 100 INJECTION, SOLUTION INTRAVENOUS; SUBCUTANEOUS at 09:02

## 2021-01-01 RX ADMIN — LACTULOSE 20 G: 20 SOLUTION ORAL at 02:02

## 2021-01-01 RX ADMIN — PROPOFOL 50 MCG/KG/MIN: 10 INJECTION, EMULSION INTRAVENOUS at 01:02

## 2021-01-01 RX ADMIN — CARVEDILOL 12.5 MG: 12.5 TABLET, FILM COATED ORAL at 05:02

## 2021-01-01 RX ADMIN — MORPHINE SULFATE 4 MG: 4 INJECTION INTRAVENOUS at 02:02

## 2021-01-01 RX ADMIN — INSULIN ASPART 2 UNITS: 100 INJECTION, SOLUTION INTRAVENOUS; SUBCUTANEOUS at 04:02

## 2021-01-01 RX ADMIN — LACTULOSE 20 G: 20 SOLUTION ORAL at 09:02

## 2021-01-01 RX ADMIN — FOLIC ACID 1 MG: 1 TABLET ORAL at 09:02

## 2021-01-01 RX ADMIN — POTASSIUM CHLORIDE 10 MEQ: 7.46 INJECTION, SOLUTION INTRAVENOUS at 03:02

## 2021-01-01 RX ADMIN — BISACODYL 10 MG: 10 SUPPOSITORY RECTAL at 11:02

## 2021-01-01 RX ADMIN — CARVEDILOL 12.5 MG: 12.5 TABLET, FILM COATED ORAL at 07:02

## 2021-01-01 RX ADMIN — BISACODYL 10 MG: 10 SUPPOSITORY RECTAL at 08:02

## 2021-01-01 RX ADMIN — SODIUM BICARBONATE 50 MEQ: 84 INJECTION, SOLUTION INTRAVENOUS at 04:02

## 2021-01-01 RX ADMIN — PROPOFOL 40 MCG/KG/MIN: 10 INJECTION, EMULSION INTRAVENOUS at 05:02

## 2021-01-01 RX ADMIN — DOBUTAMINE HYDROCHLORIDE 2.5 MCG/KG/MIN: 400 INJECTION INTRAVENOUS at 10:02

## 2021-01-01 RX ADMIN — POTASSIUM & SODIUM PHOSPHATES POWDER PACK 280-160-250 MG 2 PACKET: 280-160-250 PACK at 06:02

## 2021-01-01 RX ADMIN — INSULIN ASPART 3 UNITS: 100 INJECTION, SOLUTION INTRAVENOUS; SUBCUTANEOUS at 05:02

## 2021-01-01 RX ADMIN — IOHEXOL 100 ML: 350 INJECTION, SOLUTION INTRAVENOUS at 06:02

## 2021-01-01 RX ADMIN — FENTANYL CITRATE 250 MCG/HR: 50 INJECTION, SOLUTION INTRAMUSCULAR; INTRAVENOUS at 04:02

## 2021-01-01 RX ADMIN — FAMOTIDINE 20 MG: 10 INJECTION INTRAVENOUS at 10:02

## 2021-01-01 RX ADMIN — FUROSEMIDE 40 MG: 10 INJECTION, SOLUTION INTRAMUSCULAR; INTRAVENOUS at 12:02

## 2021-01-01 RX ADMIN — POTASSIUM CHLORIDE 10 MEQ: 7.46 INJECTION, SOLUTION INTRAVENOUS at 07:02

## 2021-01-01 RX ADMIN — POTASSIUM CHLORIDE 10 MEQ: 7.46 INJECTION, SOLUTION INTRAVENOUS at 09:02

## 2021-01-01 RX ADMIN — PREDNISOLONE 39.99 MG: 15 SOLUTION ORAL at 01:02

## 2021-01-01 RX ADMIN — FENTANYL CITRATE 50 MCG/HR: 50 INJECTION, SOLUTION INTRAMUSCULAR; INTRAVENOUS at 06:02

## 2021-01-01 RX ADMIN — DEXTROSE MONOHYDRATE 25 G: 25 INJECTION, SOLUTION INTRAVENOUS at 06:02

## 2021-01-01 RX ADMIN — DOBUTAMINE HYDROCHLORIDE 5 MCG/KG/MIN: 400 INJECTION INTRAVENOUS at 08:02

## 2021-01-01 RX ADMIN — FENTANYL CITRATE 175 MCG/HR: 50 INJECTION, SOLUTION INTRAMUSCULAR; INTRAVENOUS at 04:02

## 2021-01-01 RX ADMIN — POTASSIUM CHLORIDE 10 MEQ: 7.46 INJECTION, SOLUTION INTRAVENOUS at 12:02

## 2021-01-01 RX ADMIN — Medication 100 MG: at 09:02

## 2021-01-01 RX ADMIN — EPINEPHRINE 1 MG: 0.1 INJECTION, SOLUTION ENDOTRACHEAL; INTRACARDIAC; INTRAVENOUS at 05:02

## 2021-01-01 RX ADMIN — ROCURONIUM BROMIDE 70 MG: 10 INJECTION, SOLUTION INTRAVENOUS at 04:02

## 2021-01-01 RX ADMIN — FENTANYL CITRATE 250 MCG/HR: 50 INJECTION, SOLUTION INTRAMUSCULAR; INTRAVENOUS at 05:02

## 2021-01-01 RX ADMIN — CARVEDILOL 6.25 MG: 6.25 TABLET, FILM COATED ORAL at 09:02

## 2021-01-01 RX ADMIN — ALPRAZOLAM 0.5 MG: 0.5 TABLET ORAL at 10:02

## 2021-01-01 RX ADMIN — EPINEPHRINE 1 MG: 0.1 INJECTION, SOLUTION ENDOTRACHEAL; INTRACARDIAC; INTRAVENOUS at 12:02

## 2021-01-01 RX ADMIN — CEFTRIAXONE 2 G: 2 INJECTION, SOLUTION INTRAVENOUS at 10:02

## 2021-01-01 RX ADMIN — PROPOFOL 50 MCG/KG/MIN: 10 INJECTION, EMULSION INTRAVENOUS at 05:02

## 2021-01-01 RX ADMIN — AMIODARONE HYDROCHLORIDE 200 MG: 200 TABLET ORAL at 09:02

## 2021-01-01 RX ADMIN — METOROPROLOL TARTRATE 2.5 MG: 5 INJECTION, SOLUTION INTRAVENOUS at 06:02

## 2021-01-01 RX ADMIN — SODIUM PHOSPHATE, MONOBASIC, MONOHYDRATE 15 MMOL: 276; 142 INJECTION, SOLUTION INTRAVENOUS at 12:02

## 2021-01-01 RX ADMIN — POTASSIUM CHLORIDE 10 MEQ: 7.46 INJECTION, SOLUTION INTRAVENOUS at 04:02

## 2021-01-01 RX ADMIN — LORAZEPAM 2 MG: 2 INJECTION INTRAMUSCULAR; INTRAVENOUS at 11:02

## 2021-01-01 RX ADMIN — SODIUM BICARBONATE 50 MEQ: 84 INJECTION, SOLUTION INTRAVENOUS at 06:02

## 2021-01-01 RX ADMIN — CARVEDILOL 12.5 MG: 12.5 TABLET, FILM COATED ORAL at 06:02

## 2021-01-01 RX ADMIN — LACTULOSE 20 G: 20 SOLUTION ORAL at 08:02

## 2021-01-01 RX ADMIN — LACTULOSE 20 G: 20 SOLUTION ORAL at 11:02

## 2021-01-01 RX ADMIN — POTASSIUM CHLORIDE 10 MEQ: 7.46 INJECTION, SOLUTION INTRAVENOUS at 06:02

## 2021-01-01 RX ADMIN — LOSARTAN POTASSIUM 12.5 MG: 25 TABLET, FILM COATED ORAL at 04:02

## 2021-01-01 RX ADMIN — FAMOTIDINE 20 MG: 10 INJECTION INTRAVENOUS at 09:02

## 2021-01-01 RX ADMIN — POTASSIUM CHLORIDE 10 MEQ: 7.46 INJECTION, SOLUTION INTRAVENOUS at 02:02

## 2021-01-01 RX ADMIN — POTASSIUM BICARBONATE 25 MEQ: 977.5 TABLET, EFFERVESCENT ORAL at 08:02

## 2021-01-01 RX ADMIN — SODIUM BICARBONATE: 84 INJECTION, SOLUTION INTRAVENOUS at 05:02

## 2021-01-01 RX ADMIN — IOHEXOL 50 ML: 350 INJECTION, SOLUTION INTRAVENOUS at 06:02

## 2021-01-01 RX ADMIN — PROPOFOL 50 MCG/KG/MIN: 10 INJECTION, EMULSION INTRAVENOUS at 10:02

## 2021-01-01 RX ADMIN — CEFTRIAXONE 2 G: 2 INJECTION, SOLUTION INTRAVENOUS at 02:02

## 2021-01-01 RX ADMIN — AMIODARONE HYDROCHLORIDE 200 MG: 200 TABLET ORAL at 10:02

## 2021-01-01 RX ADMIN — INSULIN ASPART 3 UNITS: 100 INJECTION, SOLUTION INTRAVENOUS; SUBCUTANEOUS at 04:02

## 2021-02-06 PROBLEM — J96.01 ACUTE RESPIRATORY FAILURE WITH HYPOXIA: Status: ACTIVE | Noted: 2021-01-01

## 2021-02-06 PROBLEM — N17.9 AKI (ACUTE KIDNEY INJURY): Status: ACTIVE | Noted: 2021-01-01

## 2021-02-06 PROBLEM — R06.02 SOB (SHORTNESS OF BREATH): Status: ACTIVE | Noted: 2021-01-01

## 2021-02-07 PROBLEM — K72.00 ACUTE LIVER FAILURE WITHOUT HEPATIC COMA: Status: ACTIVE | Noted: 2021-01-01

## 2021-02-07 PROBLEM — D64.9 ANEMIA: Status: ACTIVE | Noted: 2021-01-01

## 2021-02-09 PROBLEM — I10 ESSENTIAL HYPERTENSION: Chronic | Status: ACTIVE | Noted: 2019-11-13

## 2021-02-09 PROBLEM — F10.10 ETOH ABUSE: Chronic | Status: ACTIVE | Noted: 2019-11-13

## 2021-02-09 PROBLEM — N17.9 AKI (ACUTE KIDNEY INJURY): Status: RESOLVED | Noted: 2021-01-01 | Resolved: 2021-01-01

## 2021-02-13 PROBLEM — K72.00 ACUTE LIVER FAILURE WITHOUT HEPATIC COMA: Status: RESOLVED | Noted: 2021-01-01 | Resolved: 2021-01-01

## 2021-02-13 PROBLEM — J96.01 ACUTE HYPOXEMIC RESPIRATORY FAILURE: Status: RESOLVED | Noted: 2018-08-15 | Resolved: 2021-01-01

## 2021-02-13 PROBLEM — D64.9 ANEMIA: Status: RESOLVED | Noted: 2021-01-01 | Resolved: 2021-01-01

## 2021-02-15 LAB — HBV SURFACE AB SER-ACNC: NEGATIVE M[IU]/ML

## 2024-01-01 NOTE — PROGRESS NOTES
Subjective:   Patient ID:  Ute Mcbride is a 64 y.o. male who presents for cardiac consult of Hypertension (5 wk f/u)      HPI  The patient came in today for cardiac consult of Hypertension (5 wk f/u)    Ute Mcbride is a 64 y.o. male  pt with NICM EF 20-25%, Chronic AF on coumadin, HTN, alc abuse, gout presents for CV follow up.     2/14/18  Pt said he was doing ok day prior to ED visit but after drinking some beer he felt more tired and started having more palpitations. He had been seen a few years ago by cardiology and had been doing well. He is compliant with meds but is going to run out soon and came to ED for evaluation. He was given IV cardizem as he was in AF RVR and felt fine after HR improved. ECG - AF RVR, trop negative, . Upon my exam in ED pt felt fine, HR and BP well controlled.  He had 2D ECHO which revealed LVEF 20% as in past with PH with PASP 62 mmHg along with moderate/severe RV dysfunction.   Was discharged from ED to follow up in clinic.     4/9/18  Pt presented to [facility] for PEGGY/DCCV on 3/22/18, pt had positive VERONICA thrombus with subtherapeutic INR prior to study. DCCV was not attempted and discharged to follow up at coumadin clinic. Recent INR supratherapeutic at 4.5. No bleeding issues. Pt walks every day in the morning about a mile, mild SOB but overall ok.     5/22/18  Pt had successful DCCV 4/26/18 after PEGGY revealed no VERONICA thrombus. He was started on amio 200 mg BID. PT feels well overall, no CP/SOB/palpitations. INR elevated today, no bleeding issues. Bp well controlled today.     8/15/18 unsuccessful pulmonary vein RF ablation.    1/8/19  Pt had attempt at AF ablation 8/2018 but went into resp failure during anesthesia and was transferred to ICU and restarted on CHF meds. For afib, continued on dig, amio, and coumadin.  He has not been on any meds past 3 days due to changing insurance company and just picked up meds yesterday AM.     4/9/19  INR today 2.6. BP elevated this  AM has not taken meds yet. Had rheum eval and has been started on colchicine for gout. Overall pt feels well.   ECG today - Afib V rate 62, RBBB, LPFB     5/16/19  INR today 2.2. BP elevated, discussed will increase lisinipril to 40 mg. Had more salty food lately - Crawfish boil. Needs more compliance. No CP/SOB.     6/27/19  BP is elevated again. He has been on dif meds for gout which decreased pain but thinks affecting his BP. Needs to keep a log.     Patient feels  no chest pain, no sob, no leg swelling, no PND, no palpitation, no dizziness, no syncope, no CNS symptoms.    Patient is compliant with medications.    2D ECHO 08/17/2018  CONCLUSIONS     1 - Upper limit of normal left ventricular enlargement.     2 - Severely depressed left ventricular systolic function (EF 20-25%).     3 - Concentric hypertrophy.     4 - Right ventricular enlargement with mildly depressed systolic function.     5 - Trivial pericardial effusion.     6 - Increased central venous pressure.     7 - LV echo density as per text , which is likely clinicallu insignificant.       Past Medical History:   Diagnosis Date    Anticoagulant long-term use     but has been noncompliant    Arthritis     Atrial fibrillation, chronic     Cardiomyopathy, nonischemic 11/9/2014    CHF (congestive heart failure)     Coronary artery disease     Encounter for blood transfusion     Gout     Hypertension        Past Surgical History:   Procedure Laterality Date    ABLATION N/A 8/15/2018    [facility] C[facility] LAB    ANKLE SURGERY      CARDIAC CATHETERIZATION      CARDIOVERSION N/A 4/26/2018    [facility] C[facility] LAB    CARDIOVERSION N/A 3/22/2018    [facility] CATH LAB    CATARACT EXTRACTION W/  INTRAOCULAR LENS IMPLANT Bilateral     COLONOSCOPY N/A 6/13/2014    Per[facility] by [facility]ney [facility] ENDO    ECHOCARDIOGRAM,TRANSESOPHAGEAL N/A 8/15/2018     [facility] C[facility]    EGD N/A 2014    [facility] ENDO    TRANSESOPHAGEAL ECHOCARDIOGRAM (PEGGY) N/A 2018    Per[facility]man [facility] at [facility] CATH LAB    TRANSESOPHAGEAL ECHOCARDIOGRAM (PEGGY) N/A 3/22/2018    Per[facility] by [facility] at [facility] CATH LAB       Social History     Tobacco Use    Smoking status: Former Smoker     Last attempt to quit: 1994     Years since quittin.4    Smokeless tobacco: Never Used   Substance Use Topics    Alcohol use: Yes     Alcohol/week: 1.2 oz     Types: 2 Cans of beer per week    Drug use: No       Family History   Problem Relation Age of Onset    Stroke Mother     Hypertension Mother     Hypertension Father        Patient's Medications   New Prescriptions    No medications on file   Previous Medications    ALLOPURINOL (ZYLOPRIM) 100 MG TABLET    Take 1 tablet (100 mg total) by mouth once daily.    ALLOPURINOL (ZYLOPRIM) 300 MG TABLET    Take 300 mg by mouth once daily.     AMIODARONE (PACERONE) 200 MG TAB    Take 1 tablet (200 mg total) by mouth 2 (two) times daily.    CARVEDILOL (COREG) 6.25 MG TABLET    Take 1 tablet (6.25 mg total) by mouth 2 (two) times daily with meals.    DIGOXIN (LANOXIN) 250 MCG TABLET    Take 1 tablet (250 mcg total) by mouth once daily.    FUROSEMIDE (LASIX) 40 MG TABLET    Take 1 tablet (40 mg total) by mouth 2 (two) times daily.    LISINOPRIL (PRINIVIL,ZESTRIL) 40 MG TABLET    Take 1 tablet (40 mg total) by mouth once daily.    MUPIROCIN (BACTROBAN) 2 % OINTMENT    Apply topically 3 (three) times daily.    PANTOPRAZOLE (PROTONIX) 40 MG TABLET    Take 1 tablet (40 mg total) by mouth once daily.    WARFARIN (COUMADIN) 2 MG TABLET    Take 1 tablet on  and 1/2 tablet on all other day by mouth every evening as directed by coumadin clinic.   Modified Medications    No medications on file   Discontinued Medications    ALLOPURINOL (ZYLOPRIM) 300 MG TABLET    " Take 1 tablet (300 mg total) by mouth once daily.       Review of Systems   Constitutional: Negative.    HENT: Negative.    Eyes: Negative.    Respiratory: Negative.    Cardiovascular: Negative.    Gastrointestinal: Negative.    Genitourinary: Negative.    Musculoskeletal: Negative.    Skin: Negative.    Neurological: Negative.    Endo/Heme/Allergies: Negative.    Psychiatric/Behavioral: Negative.    All 12 systems otherwise negative.      Wt Readings from Last 3 Encounters:   06/27/19 72.3 kg (159 lb 6.3 oz)   06/03/19 74 kg (163 lb 4 oz)   05/16/19 74 kg (163 lb 2.3 oz)     Temp Readings from Last 3 Encounters:   08/17/18 98.2 °F (36.8 °C)   04/26/18 97.8 °F (36.6 °C) (Oral)   03/22/18 97.9 °F (36.6 °C) (Oral)     BP Readings from Last 3 Encounters:   06/27/19 (!) 198/90   06/03/19 (!) 160/88   05/16/19 (!) 200/90     Pulse Readings from Last 3 Encounters:   06/27/19 (!) 56   06/03/19 72   05/16/19 68       BP (!) 198/90 (BP Method: Small (Manual))   Pulse (!) 56   Ht 5' 6" (1.676 m)   Wt 72.3 kg (159 lb 6.3 oz)   BMI 25.73 kg/m²     Objective:   Physical Exam   Constitutional: He is oriented to person, place, and time. He appears well-developed and well-nourished. No distress.   HENT:   Head: Normocephalic and atraumatic.   Nose: Nose normal.   Mouth/Throat: Oropharynx is clear and moist.   Eyes: Conjunctivae and EOM are normal. No scleral icterus.   Neck: Normal range of motion. Neck supple. No JVD present. No thyromegaly present.   Cardiovascular: Normal rate, S1 normal and S2 normal. An irregularly irregular rhythm present. Exam reveals no gallop, no S3, no S4 and no friction rub.   No murmur heard.  Pulmonary/Chest: Effort normal and breath sounds normal. No stridor. No respiratory distress. He has no wheezes. He has no rales. He exhibits no tenderness.   Abdominal: Soft. Bowel sounds are normal. He exhibits no distension and no mass. There is no tenderness. There is no rebound.   Genitourinary: "   Genitourinary Comments: Deferred   Musculoskeletal: Normal range of motion. He exhibits deformity. He exhibits no edema or tenderness.   Gout   Lymphadenopathy:     He has no cervical adenopathy.   Neurological: He is alert and oriented to person, place, and time. He exhibits normal muscle tone. Coordination normal.   Skin: Skin is warm and dry. Rash noted. He is not diaphoretic. No erythema. No pallor.   Psychiatric: He has a normal mood and affect. His behavior is normal. Judgment and thought content normal.   Nursing note and vitals reviewed.      Lab Results   Component Value Date     06/03/2019    K 3.9 06/03/2019    CL 97 06/03/2019    CO2 24 06/03/2019    BUN 6 (L) 06/03/2019    CREATININE 0.8 06/03/2019     (H) 06/03/2019    HGBA1C 6.4 (H) 11/07/2014    MG 1.7 08/17/2018    AST 36 06/03/2019    ALT 23 06/03/2019    ALBUMIN 4.0 06/03/2019    PROT 8.8 (H) 06/03/2019    BILITOT 0.6 06/03/2019    WBC 5.47 06/03/2019    HGB 14.8 06/03/2019    HCT 45.7 06/03/2019    HCT 52 08/15/2018    MCV 94 06/03/2019     06/03/2019    INR 1.6 (A) 06/27/2019    INR 2.1 (H) 08/17/2018    TSH 4.36 (H) 01/13/2010     (H) 08/15/2018     Assessment:      1. Hypertensive heart disease with chronic combined systolic and diastolic congestive heart failure    2. Atrial fibrillation, permanent    3. Chronic combined systolic and diastolic CHF, NYHA class 1    4. Cardiomyopathy, nonischemic    5. Chronic anticoagulation        Plan:   1. NICM EF 20-25% - pt euvolemic   - cont Coreg and lisinopril  - continue lasix and K supplement  - needs EP f/u for ICD - echo ordered     2. AF - s/p Unsuccessful pulmonary vein RF ablation.  - cont Coumadin and Coreg and Digoxin  - cont amio, check level    3. HTN - BP elevated again  - cont meds, low salt diet   - check BP at home and report   - close follow up - add Imdur 60mg  - increase Lisinopril to 40mg  - nurse BP check    Thank you for allowing me to participate in  this patient's care. Please do not hesitate to contact me with any questions or concerns. Consult note has been forwarded to the referral physician.    - routine  care  - feed ad alexandria  - bilirubin monitoring per guideline, manage as indicated  - assessment is ongoing, will continue to monitor  - follow up pending result of maternal UDS

## 2024-12-31 NOTE — PLAN OF CARE
1500 AWAKE AND ORIENTED.  DISCHARGE INSTRUCTIONS REVIEWED AND COPY GIVEN.  1505 IV REMOVED.  1510 DISCHARGED HOME. TO EXIT VIA W/C   29